# Patient Record
Sex: FEMALE | Race: WHITE | NOT HISPANIC OR LATINO | URBAN - METROPOLITAN AREA
[De-identification: names, ages, dates, MRNs, and addresses within clinical notes are randomized per-mention and may not be internally consistent; named-entity substitution may affect disease eponyms.]

---

## 2023-01-01 ENCOUNTER — INPATIENT (INPATIENT)
Facility: HOSPITAL | Age: 83
LOS: 23 days | DRG: 329 | End: 2023-03-05
Attending: SURGERY | Admitting: SURGERY
Payer: MEDICARE

## 2023-01-01 VITALS
RESPIRATION RATE: 19 BRPM | OXYGEN SATURATION: 93 % | DIASTOLIC BLOOD PRESSURE: 60 MMHG | HEART RATE: 111 BPM | TEMPERATURE: 98 F | SYSTOLIC BLOOD PRESSURE: 134 MMHG | WEIGHT: 181 LBS

## 2023-01-01 VITALS — HEART RATE: 95 BPM | DIASTOLIC BLOOD PRESSURE: 74 MMHG | SYSTOLIC BLOOD PRESSURE: 179 MMHG | OXYGEN SATURATION: 88 %

## 2023-01-01 DIAGNOSIS — I63.9 CEREBRAL INFARCTION, UNSPECIFIED: ICD-10-CM

## 2023-01-01 DIAGNOSIS — K63.89 OTHER SPECIFIED DISEASES OF INTESTINE: ICD-10-CM
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
## 2023-01-01 DIAGNOSIS — Z51.5 ENCOUNTER FOR PALLIATIVE CARE: ICD-10-CM

## 2023-01-01 DIAGNOSIS — Z90.710 ACQUIRED ABSENCE OF BOTH CERVIX AND UTERUS: Chronic | ICD-10-CM

## 2023-01-01 DIAGNOSIS — Z98.890 OTHER SPECIFIED POSTPROCEDURAL STATES: Chronic | ICD-10-CM

## 2023-01-01 DIAGNOSIS — Z90.49 ACQUIRED ABSENCE OF OTHER SPECIFIED PARTS OF DIGESTIVE TRACT: Chronic | ICD-10-CM

## 2023-01-01 DIAGNOSIS — Z99.11 DEPENDENCE ON RESPIRATOR [VENTILATOR] STATUS: ICD-10-CM

## 2023-01-01 LAB
-  AMIKACIN: SIGNIFICANT CHANGE UP
-  AMOXICILLIN/CLAVULANIC ACID: SIGNIFICANT CHANGE UP
-  AMOXICILLIN/CLAVULANIC ACID: SIGNIFICANT CHANGE UP
-  AMPICILLIN/SULBACTAM: SIGNIFICANT CHANGE UP
-  AMPICILLIN/SULBACTAM: SIGNIFICANT CHANGE UP
-  AMPICILLIN: SIGNIFICANT CHANGE UP
-  AMPICILLIN: SIGNIFICANT CHANGE UP
-  AZTREONAM: SIGNIFICANT CHANGE UP
-  CEFAZOLIN: SIGNIFICANT CHANGE UP
-  CEFAZOLIN: SIGNIFICANT CHANGE UP
-  CEFEPIME: SIGNIFICANT CHANGE UP
-  CEFIDEROCOL: SIGNIFICANT CHANGE UP
-  CEFOXITIN: SIGNIFICANT CHANGE UP
-  CEFOXITIN: SIGNIFICANT CHANGE UP
-  CEFTAZIDIME/AVIBACTAM: SIGNIFICANT CHANGE UP
-  CEFTAZIDIME/AVIBACTAM: SIGNIFICANT CHANGE UP
-  CEFTAZIDIME: SIGNIFICANT CHANGE UP
-  CEFTOLOZANE/TAZOBACTAM: SIGNIFICANT CHANGE UP
-  CEFTOLOZANE/TAZOBACTAM: SIGNIFICANT CHANGE UP
-  CEFTRIAXONE: SIGNIFICANT CHANGE UP
-  CEFTRIAXONE: SIGNIFICANT CHANGE UP
-  CIPROFLOXACIN: SIGNIFICANT CHANGE UP
-  ERTAPENEM: SIGNIFICANT CHANGE UP
-  ERTAPENEM: SIGNIFICANT CHANGE UP
-  GENTAMICIN: SIGNIFICANT CHANGE UP
-  IMIPENEM: SIGNIFICANT CHANGE UP
-  LEVOFLOXACIN: SIGNIFICANT CHANGE UP
-  MEROPENEM: SIGNIFICANT CHANGE UP
-  PIPERACILLIN/TAZOBACTAM: SIGNIFICANT CHANGE UP
-  RESISTANCE GENE NDM: SIGNIFICANT CHANGE UP
-  RESISTANCE GENE NDM: SIGNIFICANT CHANGE UP
-  TOBRAMYCIN: SIGNIFICANT CHANGE UP
-  TRIMETHOPRIM/SULFAMETHOXAZOLE: SIGNIFICANT CHANGE UP
-  TRIMETHOPRIM/SULFAMETHOXAZOLE: SIGNIFICANT CHANGE UP
ALBUMIN SERPL ELPH-MCNC: 1.6 G/DL — LOW (ref 3.5–5.2)
ALBUMIN SERPL ELPH-MCNC: 1.8 G/DL — LOW (ref 3.5–5.2)
ALBUMIN SERPL ELPH-MCNC: 1.9 G/DL — LOW (ref 3.5–5.2)
ALBUMIN SERPL ELPH-MCNC: 2.2 G/DL — LOW (ref 3.5–5.2)
ALBUMIN SERPL ELPH-MCNC: 2.4 G/DL — LOW (ref 3.5–5.2)
ALBUMIN SERPL ELPH-MCNC: 4.4 G/DL — SIGNIFICANT CHANGE UP (ref 3.5–5.2)
ALP SERPL-CCNC: 106 U/L — SIGNIFICANT CHANGE UP (ref 30–115)
ALP SERPL-CCNC: 134 U/L — HIGH (ref 30–115)
ALP SERPL-CCNC: 191 U/L — HIGH (ref 30–115)
ALP SERPL-CCNC: 39 U/L — SIGNIFICANT CHANGE UP (ref 30–115)
ALP SERPL-CCNC: 44 U/L — SIGNIFICANT CHANGE UP (ref 30–115)
ALP SERPL-CCNC: 47 U/L — SIGNIFICANT CHANGE UP (ref 30–115)
ALT FLD-CCNC: 10 U/L — SIGNIFICANT CHANGE UP (ref 0–41)
ALT FLD-CCNC: 12 U/L — SIGNIFICANT CHANGE UP (ref 0–41)
ALT FLD-CCNC: 14 U/L — SIGNIFICANT CHANGE UP (ref 0–41)
ALT FLD-CCNC: 28 U/L — SIGNIFICANT CHANGE UP (ref 0–41)
ALT FLD-CCNC: 31 U/L — SIGNIFICANT CHANGE UP (ref 0–41)
ALT FLD-CCNC: 8 U/L — SIGNIFICANT CHANGE UP (ref 0–41)
ANION GAP SERPL CALC-SCNC: -1 MMOL/L — LOW (ref 7–14)
ANION GAP SERPL CALC-SCNC: 10 MMOL/L — SIGNIFICANT CHANGE UP (ref 7–14)
ANION GAP SERPL CALC-SCNC: 10 MMOL/L — SIGNIFICANT CHANGE UP (ref 7–14)
ANION GAP SERPL CALC-SCNC: 11 MMOL/L — SIGNIFICANT CHANGE UP (ref 7–14)
ANION GAP SERPL CALC-SCNC: 12 MMOL/L — SIGNIFICANT CHANGE UP (ref 7–14)
ANION GAP SERPL CALC-SCNC: 13 MMOL/L — SIGNIFICANT CHANGE UP (ref 7–14)
ANION GAP SERPL CALC-SCNC: 14 MMOL/L — SIGNIFICANT CHANGE UP (ref 7–14)
ANION GAP SERPL CALC-SCNC: 4 MMOL/L — LOW (ref 7–14)
ANION GAP SERPL CALC-SCNC: 5 MMOL/L — LOW (ref 7–14)
ANION GAP SERPL CALC-SCNC: 5 MMOL/L — LOW (ref 7–14)
ANION GAP SERPL CALC-SCNC: 6 MMOL/L — LOW (ref 7–14)
ANION GAP SERPL CALC-SCNC: 7 MMOL/L — SIGNIFICANT CHANGE UP (ref 7–14)
ANION GAP SERPL CALC-SCNC: 8 MMOL/L — SIGNIFICANT CHANGE UP (ref 7–14)
ANION GAP SERPL CALC-SCNC: 9 MMOL/L — SIGNIFICANT CHANGE UP (ref 7–14)
APPEARANCE UR: ABNORMAL
APPEARANCE UR: CLEAR — SIGNIFICANT CHANGE UP
APTT BLD: 19.6 SEC — CRITICAL LOW (ref 27–39.2)
APTT BLD: 21.4 SEC — CRITICAL LOW (ref 27–39.2)
APTT BLD: 25.9 SEC — LOW (ref 27–39.2)
APTT BLD: 28.8 SEC — SIGNIFICANT CHANGE UP (ref 27–39.2)
APTT BLD: 29.5 SEC — SIGNIFICANT CHANGE UP (ref 27–39.2)
APTT BLD: 30.4 SEC — SIGNIFICANT CHANGE UP (ref 27–39.2)
APTT BLD: SIGNIFICANT CHANGE UP SEC (ref 27–39.2)
AST SERPL-CCNC: 17 U/L — SIGNIFICANT CHANGE UP (ref 0–41)
AST SERPL-CCNC: 17 U/L — SIGNIFICANT CHANGE UP (ref 0–41)
AST SERPL-CCNC: 21 U/L — SIGNIFICANT CHANGE UP (ref 0–41)
AST SERPL-CCNC: 29 U/L — SIGNIFICANT CHANGE UP (ref 0–41)
AST SERPL-CCNC: 43 U/L — HIGH (ref 0–41)
AST SERPL-CCNC: 63 U/L — HIGH (ref 0–41)
BACTERIA # UR AUTO: ABNORMAL
BACTERIA # UR AUTO: NEGATIVE — SIGNIFICANT CHANGE UP
BASE EXCESS BLDA CALC-SCNC: -0.9 MMOL/L — SIGNIFICANT CHANGE UP (ref -2–3)
BASE EXCESS BLDA CALC-SCNC: -1.5 MMOL/L — SIGNIFICANT CHANGE UP (ref -2–3)
BASE EXCESS BLDA CALC-SCNC: -1.6 MMOL/L — SIGNIFICANT CHANGE UP (ref -2–3)
BASE EXCESS BLDA CALC-SCNC: -6 MMOL/L — LOW (ref -2–3)
BASE EXCESS BLDA CALC-SCNC: 0.4 MMOL/L — SIGNIFICANT CHANGE UP (ref -2–3)
BASE EXCESS BLDA CALC-SCNC: 3.1 MMOL/L — HIGH (ref -2–3)
BASE EXCESS BLDV CALC-SCNC: -0.8 MMOL/L — SIGNIFICANT CHANGE UP (ref -2–3)
BASOPHILS # BLD AUTO: 0.01 K/UL — SIGNIFICANT CHANGE UP (ref 0–0.2)
BASOPHILS # BLD AUTO: 0.02 K/UL — SIGNIFICANT CHANGE UP (ref 0–0.2)
BASOPHILS # BLD AUTO: 0.03 K/UL — SIGNIFICANT CHANGE UP (ref 0–0.2)
BASOPHILS # BLD AUTO: 0.04 K/UL — SIGNIFICANT CHANGE UP (ref 0–0.2)
BASOPHILS # BLD AUTO: 0.04 K/UL — SIGNIFICANT CHANGE UP (ref 0–0.2)
BASOPHILS # BLD AUTO: 0.05 K/UL — SIGNIFICANT CHANGE UP (ref 0–0.2)
BASOPHILS # BLD AUTO: 0.07 K/UL — SIGNIFICANT CHANGE UP (ref 0–0.2)
BASOPHILS # BLD AUTO: 0.13 K/UL — SIGNIFICANT CHANGE UP (ref 0–0.2)
BASOPHILS NFR BLD AUTO: 0.1 % — SIGNIFICANT CHANGE UP (ref 0–1)
BASOPHILS NFR BLD AUTO: 0.2 % — SIGNIFICANT CHANGE UP (ref 0–1)
BASOPHILS NFR BLD AUTO: 0.3 % — SIGNIFICANT CHANGE UP (ref 0–1)
BASOPHILS NFR BLD AUTO: 0.4 % — SIGNIFICANT CHANGE UP (ref 0–1)
BASOPHILS NFR BLD AUTO: 0.4 % — SIGNIFICANT CHANGE UP (ref 0–1)
BASOPHILS NFR BLD AUTO: 0.5 % — SIGNIFICANT CHANGE UP (ref 0–1)
BASOPHILS NFR BLD AUTO: 1.8 % — HIGH (ref 0–1)
BILIRUB DIRECT SERPL-MCNC: 0.4 MG/DL — HIGH (ref 0–0.3)
BILIRUB DIRECT SERPL-MCNC: <0.2 MG/DL — SIGNIFICANT CHANGE UP (ref 0–0.3)
BILIRUB INDIRECT FLD-MCNC: 0.2 MG/DL — SIGNIFICANT CHANGE UP (ref 0.2–1.2)
BILIRUB INDIRECT FLD-MCNC: >0 MG/DL — LOW (ref 0.2–1.2)
BILIRUB INDIRECT FLD-MCNC: >0 MG/DL — LOW (ref 0.2–1.2)
BILIRUB INDIRECT FLD-MCNC: >0.1 MG/DL — LOW (ref 0.2–1.2)
BILIRUB INDIRECT FLD-MCNC: >0.2 MG/DL — SIGNIFICANT CHANGE UP (ref 0.2–1.2)
BILIRUB INDIRECT FLD-MCNC: SIGNIFICANT CHANGE UP MG/DL (ref 0.2–1.2)
BILIRUB SERPL-MCNC: 0.2 MG/DL — SIGNIFICANT CHANGE UP (ref 0.2–1.2)
BILIRUB SERPL-MCNC: 0.3 MG/DL — SIGNIFICANT CHANGE UP (ref 0.2–1.2)
BILIRUB SERPL-MCNC: 0.4 MG/DL — SIGNIFICANT CHANGE UP (ref 0.2–1.2)
BILIRUB SERPL-MCNC: 0.6 MG/DL — SIGNIFICANT CHANGE UP (ref 0.2–1.2)
BILIRUB UR-MCNC: ABNORMAL
BILIRUB UR-MCNC: NEGATIVE — SIGNIFICANT CHANGE UP
BLANDM BLD POS QL PROBE: SIGNIFICANT CHANGE UP
BLANDM BLD POS QL PROBE: SIGNIFICANT CHANGE UP
BLD GP AB SCN SERPL QL: SIGNIFICANT CHANGE UP
BUN SERPL-MCNC: 10 MG/DL — SIGNIFICANT CHANGE UP (ref 10–20)
BUN SERPL-MCNC: 10 MG/DL — SIGNIFICANT CHANGE UP (ref 10–20)
BUN SERPL-MCNC: 11 MG/DL — SIGNIFICANT CHANGE UP (ref 10–20)
BUN SERPL-MCNC: 11 MG/DL — SIGNIFICANT CHANGE UP (ref 10–20)
BUN SERPL-MCNC: 12 MG/DL — SIGNIFICANT CHANGE UP (ref 10–20)
BUN SERPL-MCNC: 12 MG/DL — SIGNIFICANT CHANGE UP (ref 10–20)
BUN SERPL-MCNC: 13 MG/DL — SIGNIFICANT CHANGE UP (ref 10–20)
BUN SERPL-MCNC: 14 MG/DL — SIGNIFICANT CHANGE UP (ref 10–20)
BUN SERPL-MCNC: 15 MG/DL — SIGNIFICANT CHANGE UP (ref 10–20)
BUN SERPL-MCNC: 16 MG/DL — SIGNIFICANT CHANGE UP (ref 10–20)
BUN SERPL-MCNC: 17 MG/DL — SIGNIFICANT CHANGE UP (ref 10–20)
BUN SERPL-MCNC: 18 MG/DL — SIGNIFICANT CHANGE UP (ref 10–20)
BUN SERPL-MCNC: 22 MG/DL — HIGH (ref 10–20)
BUN SERPL-MCNC: 23 MG/DL — HIGH (ref 10–20)
BUN SERPL-MCNC: 25 MG/DL — HIGH (ref 10–20)
BUN SERPL-MCNC: 27 MG/DL — HIGH (ref 10–20)
BUN SERPL-MCNC: 28 MG/DL — HIGH (ref 10–20)
BUN SERPL-MCNC: 28 MG/DL — HIGH (ref 10–20)
BUN SERPL-MCNC: 29 MG/DL — HIGH (ref 10–20)
BUN SERPL-MCNC: 30 MG/DL — HIGH (ref 10–20)
BUN SERPL-MCNC: 31 MG/DL — HIGH (ref 10–20)
BUN SERPL-MCNC: 31 MG/DL — HIGH (ref 10–20)
BUN SERPL-MCNC: 35 MG/DL — HIGH (ref 10–20)
BUN SERPL-MCNC: 36 MG/DL — HIGH (ref 10–20)
BUN SERPL-MCNC: 9 MG/DL — LOW (ref 10–20)
CA-I SERPL-SCNC: 1.18 MMOL/L — SIGNIFICANT CHANGE UP (ref 1.15–1.33)
CALCIUM SERPL-MCNC: 10.2 MG/DL — SIGNIFICANT CHANGE UP (ref 8.4–10.5)
CALCIUM SERPL-MCNC: 5.2 MG/DL — CRITICAL LOW (ref 8.4–10.5)
CALCIUM SERPL-MCNC: 5.6 MG/DL — CRITICAL LOW (ref 8.4–10.5)
CALCIUM SERPL-MCNC: 6.4 MG/DL — LOW (ref 8.4–10.5)
CALCIUM SERPL-MCNC: 6.6 MG/DL — LOW (ref 8.4–10.5)
CALCIUM SERPL-MCNC: 6.7 MG/DL — LOW (ref 8.4–10.4)
CALCIUM SERPL-MCNC: 6.7 MG/DL — LOW (ref 8.4–10.5)
CALCIUM SERPL-MCNC: 6.8 MG/DL — LOW (ref 8.4–10.4)
CALCIUM SERPL-MCNC: 6.9 MG/DL — LOW (ref 8.4–10.4)
CALCIUM SERPL-MCNC: 6.9 MG/DL — LOW (ref 8.4–10.5)
CALCIUM SERPL-MCNC: 7 MG/DL — LOW (ref 8.4–10.4)
CALCIUM SERPL-MCNC: 7 MG/DL — LOW (ref 8.4–10.5)
CALCIUM SERPL-MCNC: 7.1 MG/DL — LOW (ref 8.4–10.4)
CALCIUM SERPL-MCNC: 7.1 MG/DL — LOW (ref 8.4–10.5)
CALCIUM SERPL-MCNC: 7.1 MG/DL — LOW (ref 8.4–10.5)
CALCIUM SERPL-MCNC: 7.2 MG/DL — LOW (ref 8.4–10.5)
CALCIUM SERPL-MCNC: 7.4 MG/DL — LOW (ref 8.4–10.4)
CALCIUM SERPL-MCNC: 7.4 MG/DL — LOW (ref 8.4–10.4)
CALCIUM SERPL-MCNC: 7.4 MG/DL — LOW (ref 8.4–10.5)
CALCIUM SERPL-MCNC: 7.5 MG/DL — LOW (ref 8.4–10.4)
CALCIUM SERPL-MCNC: 7.6 MG/DL — LOW (ref 8.4–10.5)
CALCIUM SERPL-MCNC: 7.7 MG/DL — LOW (ref 8.4–10.5)
CALCIUM SERPL-MCNC: 7.8 MG/DL — LOW (ref 8.4–10.5)
CALCIUM SERPL-MCNC: 7.9 MG/DL — LOW (ref 8.4–10.5)
CALCIUM SERPL-MCNC: 8.6 MG/DL — SIGNIFICANT CHANGE UP (ref 8.4–10.5)
CALCIUM SERPL-MCNC: 8.6 MG/DL — SIGNIFICANT CHANGE UP (ref 8.4–10.5)
CALCIUM SERPL-MCNC: 8.8 MG/DL — SIGNIFICANT CHANGE UP (ref 8.4–10.5)
CALCIUM SERPL-MCNC: 9.2 MG/DL — SIGNIFICANT CHANGE UP (ref 8.4–10.5)
CHLORIDE SERPL-SCNC: 101 MMOL/L — SIGNIFICANT CHANGE UP (ref 98–110)
CHLORIDE SERPL-SCNC: 102 MMOL/L — SIGNIFICANT CHANGE UP (ref 98–110)
CHLORIDE SERPL-SCNC: 103 MMOL/L — SIGNIFICANT CHANGE UP (ref 98–110)
CHLORIDE SERPL-SCNC: 103 MMOL/L — SIGNIFICANT CHANGE UP (ref 98–110)
CHLORIDE SERPL-SCNC: 104 MMOL/L — SIGNIFICANT CHANGE UP (ref 98–110)
CHLORIDE SERPL-SCNC: 105 MMOL/L — SIGNIFICANT CHANGE UP (ref 98–110)
CHLORIDE SERPL-SCNC: 106 MMOL/L — SIGNIFICANT CHANGE UP (ref 98–110)
CHLORIDE SERPL-SCNC: 107 MMOL/L — SIGNIFICANT CHANGE UP (ref 98–110)
CHLORIDE SERPL-SCNC: 108 MMOL/L — SIGNIFICANT CHANGE UP (ref 98–110)
CHLORIDE SERPL-SCNC: 108 MMOL/L — SIGNIFICANT CHANGE UP (ref 98–110)
CHLORIDE SERPL-SCNC: 109 MMOL/L — SIGNIFICANT CHANGE UP (ref 98–110)
CHLORIDE SERPL-SCNC: 110 MMOL/L — SIGNIFICANT CHANGE UP (ref 98–110)
CHLORIDE SERPL-SCNC: 110 MMOL/L — SIGNIFICANT CHANGE UP (ref 98–110)
CHLORIDE SERPL-SCNC: 111 MMOL/L — HIGH (ref 98–110)
CHLORIDE SERPL-SCNC: 112 MMOL/L — HIGH (ref 98–110)
CHLORIDE SERPL-SCNC: 114 MMOL/L — HIGH (ref 98–110)
CHLORIDE SERPL-SCNC: 116 MMOL/L — HIGH (ref 98–110)
CHLORIDE SERPL-SCNC: 117 MMOL/L — HIGH (ref 98–110)
CHLORIDE SERPL-SCNC: 98 MMOL/L — SIGNIFICANT CHANGE UP (ref 98–110)
CK MB CFR SERPL CALC: 2.7 NG/ML — SIGNIFICANT CHANGE UP (ref 0.6–6.3)
CK MB CFR SERPL CALC: 3.3 NG/ML — SIGNIFICANT CHANGE UP (ref 0.6–6.3)
CK MB CFR SERPL CALC: 3.6 NG/ML — SIGNIFICANT CHANGE UP (ref 0.6–6.3)
CK SERPL-CCNC: 146 U/L — SIGNIFICANT CHANGE UP (ref 0–225)
CK SERPL-CCNC: 59 U/L — SIGNIFICANT CHANGE UP (ref 0–225)
CK SERPL-CCNC: 84 U/L — SIGNIFICANT CHANGE UP (ref 0–225)
CO2 SERPL-SCNC: 17 MMOL/L — SIGNIFICANT CHANGE UP (ref 17–32)
CO2 SERPL-SCNC: 17 MMOL/L — SIGNIFICANT CHANGE UP (ref 17–32)
CO2 SERPL-SCNC: 18 MMOL/L — SIGNIFICANT CHANGE UP (ref 17–32)
CO2 SERPL-SCNC: 20 MMOL/L — SIGNIFICANT CHANGE UP (ref 17–32)
CO2 SERPL-SCNC: 21 MMOL/L — SIGNIFICANT CHANGE UP (ref 17–32)
CO2 SERPL-SCNC: 22 MMOL/L — SIGNIFICANT CHANGE UP (ref 17–32)
CO2 SERPL-SCNC: 23 MMOL/L — SIGNIFICANT CHANGE UP (ref 17–32)
CO2 SERPL-SCNC: 23 MMOL/L — SIGNIFICANT CHANGE UP (ref 17–32)
CO2 SERPL-SCNC: 24 MMOL/L — SIGNIFICANT CHANGE UP (ref 17–32)
CO2 SERPL-SCNC: 25 MMOL/L — SIGNIFICANT CHANGE UP (ref 17–32)
CO2 SERPL-SCNC: 26 MMOL/L — SIGNIFICANT CHANGE UP (ref 17–32)
CO2 SERPL-SCNC: 26 MMOL/L — SIGNIFICANT CHANGE UP (ref 17–32)
CO2 SERPL-SCNC: 27 MMOL/L — SIGNIFICANT CHANGE UP (ref 17–32)
CO2 SERPL-SCNC: 28 MMOL/L — SIGNIFICANT CHANGE UP (ref 17–32)
CO2 SERPL-SCNC: 28 MMOL/L — SIGNIFICANT CHANGE UP (ref 17–32)
CO2 SERPL-SCNC: 32 MMOL/L — SIGNIFICANT CHANGE UP (ref 17–32)
COLOR SPEC: YELLOW — SIGNIFICANT CHANGE UP
COLOR SPEC: YELLOW — SIGNIFICANT CHANGE UP
CREAT ?TM UR-MCNC: 199 MG/DL — SIGNIFICANT CHANGE UP
CREAT SERPL-MCNC: 0.5 MG/DL — LOW (ref 0.7–1.5)
CREAT SERPL-MCNC: 0.6 MG/DL — LOW (ref 0.7–1.5)
CREAT SERPL-MCNC: 0.7 MG/DL — SIGNIFICANT CHANGE UP (ref 0.7–1.5)
CREAT SERPL-MCNC: 0.8 MG/DL — SIGNIFICANT CHANGE UP (ref 0.7–1.5)
CREAT SERPL-MCNC: 0.8 MG/DL — SIGNIFICANT CHANGE UP (ref 0.7–1.5)
CREAT SERPL-MCNC: 1 MG/DL — SIGNIFICANT CHANGE UP (ref 0.7–1.5)
CREAT SERPL-MCNC: 1.1 MG/DL — SIGNIFICANT CHANGE UP (ref 0.7–1.5)
CREAT SERPL-MCNC: 1.2 MG/DL — SIGNIFICANT CHANGE UP (ref 0.7–1.5)
CREAT SERPL-MCNC: 1.3 MG/DL — SIGNIFICANT CHANGE UP (ref 0.7–1.5)
CREAT SERPL-MCNC: 1.4 MG/DL — SIGNIFICANT CHANGE UP (ref 0.7–1.5)
CREAT SERPL-MCNC: 1.9 MG/DL — HIGH (ref 0.7–1.5)
CREAT SERPL-MCNC: 1.9 MG/DL — HIGH (ref 0.7–1.5)
CREAT SERPL-MCNC: <0.5 MG/DL — LOW (ref 0.7–1.5)
CREAT SERPL-MCNC: <0.5 MG/DL — LOW (ref 0.7–1.5)
CULTURE RESULTS: NO GROWTH — SIGNIFICANT CHANGE UP
CULTURE RESULTS: SIGNIFICANT CHANGE UP
DIFF PNL FLD: NEGATIVE — SIGNIFICANT CHANGE UP
DIFF PNL FLD: NEGATIVE — SIGNIFICANT CHANGE UP
EGFR: 26 ML/MIN/1.73M2 — LOW
EGFR: 26 ML/MIN/1.73M2 — LOW
EGFR: 37 ML/MIN/1.73M2 — LOW
EGFR: 41 ML/MIN/1.73M2 — LOW
EGFR: 45 ML/MIN/1.73M2 — LOW
EGFR: 50 ML/MIN/1.73M2 — LOW
EGFR: 56 ML/MIN/1.73M2 — LOW
EGFR: 73 ML/MIN/1.73M2 — SIGNIFICANT CHANGE UP
EGFR: 73 ML/MIN/1.73M2 — SIGNIFICANT CHANGE UP
EGFR: 85 ML/MIN/1.73M2 — SIGNIFICANT CHANGE UP
EGFR: 88 ML/MIN/1.73M2 — SIGNIFICANT CHANGE UP
EGFR: 92 ML/MIN/1.73M2 — SIGNIFICANT CHANGE UP
EGFR: 98 ML/MIN/1.73M2 — SIGNIFICANT CHANGE UP
EGFR: 98 ML/MIN/1.73M2 — SIGNIFICANT CHANGE UP
EOSINOPHIL # BLD AUTO: 0 K/UL — SIGNIFICANT CHANGE UP (ref 0–0.7)
EOSINOPHIL # BLD AUTO: 0.01 K/UL — SIGNIFICANT CHANGE UP (ref 0–0.7)
EOSINOPHIL # BLD AUTO: 0.01 K/UL — SIGNIFICANT CHANGE UP (ref 0–0.7)
EOSINOPHIL # BLD AUTO: 0.07 K/UL — SIGNIFICANT CHANGE UP (ref 0–0.7)
EOSINOPHIL # BLD AUTO: 0.08 K/UL — SIGNIFICANT CHANGE UP (ref 0–0.7)
EOSINOPHIL # BLD AUTO: 0.1 K/UL — SIGNIFICANT CHANGE UP (ref 0–0.7)
EOSINOPHIL # BLD AUTO: 0.13 K/UL — SIGNIFICANT CHANGE UP (ref 0–0.7)
EOSINOPHIL # BLD AUTO: 0.13 K/UL — SIGNIFICANT CHANGE UP (ref 0–0.7)
EOSINOPHIL # BLD AUTO: 0.15 K/UL — SIGNIFICANT CHANGE UP (ref 0–0.7)
EOSINOPHIL # BLD AUTO: 0.17 K/UL — SIGNIFICANT CHANGE UP (ref 0–0.7)
EOSINOPHIL # BLD AUTO: 0.19 K/UL — SIGNIFICANT CHANGE UP (ref 0–0.7)
EOSINOPHIL # BLD AUTO: 0.2 K/UL — SIGNIFICANT CHANGE UP (ref 0–0.7)
EOSINOPHIL # BLD AUTO: 0.21 K/UL — SIGNIFICANT CHANGE UP (ref 0–0.7)
EOSINOPHIL # BLD AUTO: 0.28 K/UL — SIGNIFICANT CHANGE UP (ref 0–0.7)
EOSINOPHIL NFR BLD AUTO: 0 % — SIGNIFICANT CHANGE UP (ref 0–8)
EOSINOPHIL NFR BLD AUTO: 0.1 % — SIGNIFICANT CHANGE UP (ref 0–8)
EOSINOPHIL NFR BLD AUTO: 0.1 % — SIGNIFICANT CHANGE UP (ref 0–8)
EOSINOPHIL NFR BLD AUTO: 0.4 % — SIGNIFICANT CHANGE UP (ref 0–8)
EOSINOPHIL NFR BLD AUTO: 0.6 % — SIGNIFICANT CHANGE UP (ref 0–8)
EOSINOPHIL NFR BLD AUTO: 0.6 % — SIGNIFICANT CHANGE UP (ref 0–8)
EOSINOPHIL NFR BLD AUTO: 0.9 % — SIGNIFICANT CHANGE UP (ref 0–8)
EOSINOPHIL NFR BLD AUTO: 0.9 % — SIGNIFICANT CHANGE UP (ref 0–8)
EOSINOPHIL NFR BLD AUTO: 1 % — SIGNIFICANT CHANGE UP (ref 0–8)
EOSINOPHIL NFR BLD AUTO: 1.2 % — SIGNIFICANT CHANGE UP (ref 0–8)
EOSINOPHIL NFR BLD AUTO: 1.5 % — SIGNIFICANT CHANGE UP (ref 0–8)
EOSINOPHIL NFR BLD AUTO: 1.5 % — SIGNIFICANT CHANGE UP (ref 0–8)
EOSINOPHIL NFR BLD AUTO: 1.8 % — SIGNIFICANT CHANGE UP (ref 0–8)
EOSINOPHIL NFR BLD AUTO: 1.9 % — SIGNIFICANT CHANGE UP (ref 0–8)
EOSINOPHIL NFR BLD AUTO: 2.7 % — SIGNIFICANT CHANGE UP (ref 0–8)
EPI CELLS # UR: 11 /HPF — HIGH (ref 0–5)
EPI CELLS # UR: 3 /HPF — SIGNIFICANT CHANGE UP (ref 0–5)
FLUAV AG NPH QL: SIGNIFICANT CHANGE UP
FLUBV AG NPH QL: SIGNIFICANT CHANGE UP
GAS PNL BLDA: SIGNIFICANT CHANGE UP
GAS PNL BLDV: 137 MMOL/L — SIGNIFICANT CHANGE UP (ref 136–145)
GAS PNL BLDV: SIGNIFICANT CHANGE UP
GLUCOSE BLDC GLUCOMTR-MCNC: 101 MG/DL — HIGH (ref 70–99)
GLUCOSE BLDC GLUCOMTR-MCNC: 103 MG/DL — HIGH (ref 70–99)
GLUCOSE BLDC GLUCOMTR-MCNC: 104 MG/DL — HIGH (ref 70–99)
GLUCOSE BLDC GLUCOMTR-MCNC: 104 MG/DL — HIGH (ref 70–99)
GLUCOSE BLDC GLUCOMTR-MCNC: 105 MG/DL — HIGH (ref 70–99)
GLUCOSE BLDC GLUCOMTR-MCNC: 106 MG/DL — HIGH (ref 70–99)
GLUCOSE BLDC GLUCOMTR-MCNC: 108 MG/DL — HIGH (ref 70–99)
GLUCOSE BLDC GLUCOMTR-MCNC: 108 MG/DL — HIGH (ref 70–99)
GLUCOSE BLDC GLUCOMTR-MCNC: 109 MG/DL — HIGH (ref 70–99)
GLUCOSE BLDC GLUCOMTR-MCNC: 110 MG/DL — HIGH (ref 70–99)
GLUCOSE BLDC GLUCOMTR-MCNC: 111 MG/DL — HIGH (ref 70–99)
GLUCOSE BLDC GLUCOMTR-MCNC: 117 MG/DL — HIGH (ref 70–99)
GLUCOSE BLDC GLUCOMTR-MCNC: 119 MG/DL — HIGH (ref 70–99)
GLUCOSE BLDC GLUCOMTR-MCNC: 119 MG/DL — HIGH (ref 70–99)
GLUCOSE BLDC GLUCOMTR-MCNC: 122 MG/DL — HIGH (ref 70–99)
GLUCOSE BLDC GLUCOMTR-MCNC: 122 MG/DL — HIGH (ref 70–99)
GLUCOSE BLDC GLUCOMTR-MCNC: 123 MG/DL — HIGH (ref 70–99)
GLUCOSE BLDC GLUCOMTR-MCNC: 124 MG/DL — HIGH (ref 70–99)
GLUCOSE BLDC GLUCOMTR-MCNC: 125 MG/DL — HIGH (ref 70–99)
GLUCOSE BLDC GLUCOMTR-MCNC: 125 MG/DL — HIGH (ref 70–99)
GLUCOSE BLDC GLUCOMTR-MCNC: 127 MG/DL — HIGH (ref 70–99)
GLUCOSE BLDC GLUCOMTR-MCNC: 128 MG/DL — HIGH (ref 70–99)
GLUCOSE BLDC GLUCOMTR-MCNC: 128 MG/DL — HIGH (ref 70–99)
GLUCOSE BLDC GLUCOMTR-MCNC: 132 MG/DL — HIGH (ref 70–99)
GLUCOSE BLDC GLUCOMTR-MCNC: 132 MG/DL — HIGH (ref 70–99)
GLUCOSE BLDC GLUCOMTR-MCNC: 134 MG/DL — HIGH (ref 70–99)
GLUCOSE BLDC GLUCOMTR-MCNC: 135 MG/DL — HIGH (ref 70–99)
GLUCOSE BLDC GLUCOMTR-MCNC: 135 MG/DL — HIGH (ref 70–99)
GLUCOSE BLDC GLUCOMTR-MCNC: 136 MG/DL — HIGH (ref 70–99)
GLUCOSE BLDC GLUCOMTR-MCNC: 137 MG/DL — HIGH (ref 70–99)
GLUCOSE BLDC GLUCOMTR-MCNC: 143 MG/DL — HIGH (ref 70–99)
GLUCOSE BLDC GLUCOMTR-MCNC: 146 MG/DL — HIGH (ref 70–99)
GLUCOSE BLDC GLUCOMTR-MCNC: 147 MG/DL — HIGH (ref 70–99)
GLUCOSE BLDC GLUCOMTR-MCNC: 149 MG/DL — HIGH (ref 70–99)
GLUCOSE BLDC GLUCOMTR-MCNC: 153 MG/DL — HIGH (ref 70–99)
GLUCOSE BLDC GLUCOMTR-MCNC: 156 MG/DL — HIGH (ref 70–99)
GLUCOSE BLDC GLUCOMTR-MCNC: 158 MG/DL — HIGH (ref 70–99)
GLUCOSE BLDC GLUCOMTR-MCNC: 160 MG/DL — HIGH (ref 70–99)
GLUCOSE BLDC GLUCOMTR-MCNC: 160 MG/DL — HIGH (ref 70–99)
GLUCOSE BLDC GLUCOMTR-MCNC: 168 MG/DL — HIGH (ref 70–99)
GLUCOSE BLDC GLUCOMTR-MCNC: 176 MG/DL — HIGH (ref 70–99)
GLUCOSE BLDC GLUCOMTR-MCNC: 186 MG/DL — HIGH (ref 70–99)
GLUCOSE BLDC GLUCOMTR-MCNC: 80 MG/DL — SIGNIFICANT CHANGE UP (ref 70–99)
GLUCOSE BLDC GLUCOMTR-MCNC: 82 MG/DL — SIGNIFICANT CHANGE UP (ref 70–99)
GLUCOSE BLDC GLUCOMTR-MCNC: 82 MG/DL — SIGNIFICANT CHANGE UP (ref 70–99)
GLUCOSE BLDC GLUCOMTR-MCNC: 85 MG/DL — SIGNIFICANT CHANGE UP (ref 70–99)
GLUCOSE BLDC GLUCOMTR-MCNC: 92 MG/DL — SIGNIFICANT CHANGE UP (ref 70–99)
GLUCOSE BLDC GLUCOMTR-MCNC: 94 MG/DL — SIGNIFICANT CHANGE UP (ref 70–99)
GLUCOSE SERPL-MCNC: 102 MG/DL — HIGH (ref 70–99)
GLUCOSE SERPL-MCNC: 105 MG/DL — HIGH (ref 70–99)
GLUCOSE SERPL-MCNC: 109 MG/DL — HIGH (ref 70–99)
GLUCOSE SERPL-MCNC: 110 MG/DL — HIGH (ref 70–99)
GLUCOSE SERPL-MCNC: 111 MG/DL — HIGH (ref 70–99)
GLUCOSE SERPL-MCNC: 111 MG/DL — HIGH (ref 70–99)
GLUCOSE SERPL-MCNC: 112 MG/DL — HIGH (ref 70–99)
GLUCOSE SERPL-MCNC: 116 MG/DL — HIGH (ref 70–99)
GLUCOSE SERPL-MCNC: 119 MG/DL — HIGH (ref 70–99)
GLUCOSE SERPL-MCNC: 119 MG/DL — HIGH (ref 70–99)
GLUCOSE SERPL-MCNC: 120 MG/DL — HIGH (ref 70–99)
GLUCOSE SERPL-MCNC: 121 MG/DL — HIGH (ref 70–99)
GLUCOSE SERPL-MCNC: 122 MG/DL — HIGH (ref 70–99)
GLUCOSE SERPL-MCNC: 128 MG/DL — HIGH (ref 70–99)
GLUCOSE SERPL-MCNC: 128 MG/DL — HIGH (ref 70–99)
GLUCOSE SERPL-MCNC: 137 MG/DL — HIGH (ref 70–99)
GLUCOSE SERPL-MCNC: 139 MG/DL — HIGH (ref 70–99)
GLUCOSE SERPL-MCNC: 141 MG/DL — HIGH (ref 70–99)
GLUCOSE SERPL-MCNC: 146 MG/DL — HIGH (ref 70–99)
GLUCOSE SERPL-MCNC: 146 MG/DL — HIGH (ref 70–99)
GLUCOSE SERPL-MCNC: 150 MG/DL — HIGH (ref 70–99)
GLUCOSE SERPL-MCNC: 163 MG/DL — HIGH (ref 70–99)
GLUCOSE SERPL-MCNC: 165 MG/DL — HIGH (ref 70–99)
GLUCOSE SERPL-MCNC: 72 MG/DL — SIGNIFICANT CHANGE UP (ref 70–99)
GLUCOSE SERPL-MCNC: 86 MG/DL — SIGNIFICANT CHANGE UP (ref 70–99)
GLUCOSE SERPL-MCNC: 89 MG/DL — SIGNIFICANT CHANGE UP (ref 70–99)
GLUCOSE SERPL-MCNC: 91 MG/DL — SIGNIFICANT CHANGE UP (ref 70–99)
GLUCOSE SERPL-MCNC: 93 MG/DL — SIGNIFICANT CHANGE UP (ref 70–99)
GLUCOSE SERPL-MCNC: 97 MG/DL — SIGNIFICANT CHANGE UP (ref 70–99)
GLUCOSE SERPL-MCNC: 98 MG/DL — SIGNIFICANT CHANGE UP (ref 70–99)
GLUCOSE SERPL-MCNC: 99 MG/DL — SIGNIFICANT CHANGE UP (ref 70–99)
GLUCOSE UR QL: NEGATIVE — SIGNIFICANT CHANGE UP
GLUCOSE UR QL: NEGATIVE — SIGNIFICANT CHANGE UP
GRAM STN FLD: SIGNIFICANT CHANGE UP
GRAN CASTS # UR COMP ASSIST: 1 /LPF — HIGH
HCO3 BLDA-SCNC: 17 MMOL/L — LOW (ref 21–28)
HCO3 BLDA-SCNC: 22 MMOL/L — SIGNIFICANT CHANGE UP (ref 21–28)
HCO3 BLDA-SCNC: 22 MMOL/L — SIGNIFICANT CHANGE UP (ref 21–28)
HCO3 BLDA-SCNC: 23 MMOL/L — SIGNIFICANT CHANGE UP (ref 21–28)
HCO3 BLDA-SCNC: 24 MMOL/L — SIGNIFICANT CHANGE UP (ref 21–28)
HCO3 BLDA-SCNC: 27 MMOL/L — SIGNIFICANT CHANGE UP (ref 21–28)
HCO3 BLDV-SCNC: 24 MMOL/L — SIGNIFICANT CHANGE UP (ref 22–29)
HCT VFR BLD CALC: 21.7 % — LOW (ref 37–47)
HCT VFR BLD CALC: 21.7 % — LOW (ref 37–47)
HCT VFR BLD CALC: 21.9 % — LOW (ref 37–47)
HCT VFR BLD CALC: 22.5 % — LOW (ref 37–47)
HCT VFR BLD CALC: 23.8 % — LOW (ref 37–47)
HCT VFR BLD CALC: 24.1 % — LOW (ref 37–47)
HCT VFR BLD CALC: 24.2 % — LOW (ref 37–47)
HCT VFR BLD CALC: 24.3 % — LOW (ref 37–47)
HCT VFR BLD CALC: 24.4 % — LOW (ref 37–47)
HCT VFR BLD CALC: 24.7 % — LOW (ref 37–47)
HCT VFR BLD CALC: 24.9 % — LOW (ref 37–47)
HCT VFR BLD CALC: 25.1 % — LOW (ref 37–47)
HCT VFR BLD CALC: 25.2 % — LOW (ref 37–47)
HCT VFR BLD CALC: 25.3 % — LOW (ref 37–47)
HCT VFR BLD CALC: 25.3 % — LOW (ref 37–47)
HCT VFR BLD CALC: 25.8 % — LOW (ref 37–47)
HCT VFR BLD CALC: 26.2 % — LOW (ref 37–47)
HCT VFR BLD CALC: 27.5 % — LOW (ref 37–47)
HCT VFR BLD CALC: 27.8 % — LOW (ref 37–47)
HCT VFR BLD CALC: 28.5 % — LOW (ref 37–47)
HCT VFR BLD CALC: 28.7 % — LOW (ref 37–47)
HCT VFR BLD CALC: 32.5 % — LOW (ref 37–47)
HCT VFR BLD CALC: 33.1 % — LOW (ref 37–47)
HCT VFR BLD CALC: 33.2 % — LOW (ref 37–47)
HCT VFR BLD CALC: 33.3 % — LOW (ref 37–47)
HCT VFR BLD CALC: 34.2 % — LOW (ref 37–47)
HCT VFR BLD CALC: 34.5 % — LOW (ref 37–47)
HCT VFR BLD CALC: 36.4 % — LOW (ref 37–47)
HCT VFR BLD CALC: 38.5 % — SIGNIFICANT CHANGE UP (ref 37–47)
HCT VFR BLD CALC: 39.1 % — SIGNIFICANT CHANGE UP (ref 37–47)
HCT VFR BLD CALC: 39.8 % — SIGNIFICANT CHANGE UP (ref 37–47)
HCT VFR BLD CALC: 41 % — SIGNIFICANT CHANGE UP (ref 37–47)
HCT VFR BLD CALC: 42.3 % — SIGNIFICANT CHANGE UP (ref 37–47)
HCT VFR BLDA CALC: 36 % — LOW (ref 39–51)
HGB BLD CALC-MCNC: 12 G/DL — LOW (ref 12.6–17.4)
HGB BLD-MCNC: 10.1 G/DL — LOW (ref 12–16)
HGB BLD-MCNC: 10.4 G/DL — LOW (ref 12–16)
HGB BLD-MCNC: 10.5 G/DL — LOW (ref 12–16)
HGB BLD-MCNC: 10.6 G/DL — LOW (ref 12–16)
HGB BLD-MCNC: 10.8 G/DL — LOW (ref 12–16)
HGB BLD-MCNC: 11 G/DL — LOW (ref 12–16)
HGB BLD-MCNC: 11.8 G/DL — LOW (ref 12–16)
HGB BLD-MCNC: 12.1 G/DL — SIGNIFICANT CHANGE UP (ref 12–16)
HGB BLD-MCNC: 12.3 G/DL — SIGNIFICANT CHANGE UP (ref 12–16)
HGB BLD-MCNC: 12.7 G/DL — SIGNIFICANT CHANGE UP (ref 12–16)
HGB BLD-MCNC: 13 G/DL — SIGNIFICANT CHANGE UP (ref 12–16)
HGB BLD-MCNC: 13.1 G/DL — SIGNIFICANT CHANGE UP (ref 12–16)
HGB BLD-MCNC: 6.6 G/DL — CRITICAL LOW (ref 12–16)
HGB BLD-MCNC: 6.7 G/DL — CRITICAL LOW (ref 12–16)
HGB BLD-MCNC: 6.7 G/DL — CRITICAL LOW (ref 12–16)
HGB BLD-MCNC: 7.1 G/DL — LOW (ref 12–16)
HGB BLD-MCNC: 7.3 G/DL — LOW (ref 12–16)
HGB BLD-MCNC: 7.4 G/DL — LOW (ref 12–16)
HGB BLD-MCNC: 7.5 G/DL — LOW (ref 12–16)
HGB BLD-MCNC: 7.6 G/DL — LOW (ref 12–16)
HGB BLD-MCNC: 7.6 G/DL — LOW (ref 12–16)
HGB BLD-MCNC: 7.8 G/DL — LOW (ref 12–16)
HGB BLD-MCNC: 7.8 G/DL — LOW (ref 12–16)
HGB BLD-MCNC: 7.9 G/DL — LOW (ref 12–16)
HGB BLD-MCNC: 8 G/DL — LOW (ref 12–16)
HGB BLD-MCNC: 8.3 G/DL — LOW (ref 12–16)
HGB BLD-MCNC: 8.3 G/DL — LOW (ref 12–16)
HGB BLD-MCNC: 8.7 G/DL — LOW (ref 12–16)
HGB BLD-MCNC: 8.7 G/DL — LOW (ref 12–16)
HGB BLD-MCNC: 8.8 G/DL — LOW (ref 12–16)
HGB BLD-MCNC: 9 G/DL — LOW (ref 12–16)
HOROWITZ INDEX BLDA+IHG-RTO: 40 — SIGNIFICANT CHANGE UP
HYALINE CASTS # UR AUTO: 4 /LPF — SIGNIFICANT CHANGE UP (ref 0–7)
HYALINE CASTS # UR AUTO: 80 /LPF — HIGH (ref 0–7)
IMM GRANULOCYTES NFR BLD AUTO: 0.3 % — SIGNIFICANT CHANGE UP (ref 0.1–0.3)
IMM GRANULOCYTES NFR BLD AUTO: 0.4 % — HIGH (ref 0.1–0.3)
IMM GRANULOCYTES NFR BLD AUTO: 0.4 % — HIGH (ref 0.1–0.3)
IMM GRANULOCYTES NFR BLD AUTO: 0.5 % — HIGH (ref 0.1–0.3)
IMM GRANULOCYTES NFR BLD AUTO: 0.6 % — HIGH (ref 0.1–0.3)
IMM GRANULOCYTES NFR BLD AUTO: 1.6 % — HIGH (ref 0.1–0.3)
IMM GRANULOCYTES NFR BLD AUTO: 1.7 % — HIGH (ref 0.1–0.3)
IMM GRANULOCYTES NFR BLD AUTO: 1.8 % — HIGH (ref 0.1–0.3)
IMM GRANULOCYTES NFR BLD AUTO: 2.1 % — HIGH (ref 0.1–0.3)
IMM GRANULOCYTES NFR BLD AUTO: 2.3 % — HIGH (ref 0.1–0.3)
IMM GRANULOCYTES NFR BLD AUTO: 2.8 % — HIGH (ref 0.1–0.3)
IMM GRANULOCYTES NFR BLD AUTO: 3.4 % — HIGH (ref 0.1–0.3)
IMM GRANULOCYTES NFR BLD AUTO: 3.4 % — HIGH (ref 0.1–0.3)
IMM GRANULOCYTES NFR BLD AUTO: 3.6 % — HIGH (ref 0.1–0.3)
IMM GRANULOCYTES NFR BLD AUTO: 4.8 % — HIGH (ref 0.1–0.3)
INR BLD: 0.92 RATIO — SIGNIFICANT CHANGE UP (ref 0.65–1.3)
INR BLD: 0.94 RATIO — SIGNIFICANT CHANGE UP (ref 0.65–1.3)
INR BLD: 0.95 RATIO — SIGNIFICANT CHANGE UP (ref 0.65–1.3)
INR BLD: 0.97 RATIO — SIGNIFICANT CHANGE UP (ref 0.65–1.3)
INR BLD: 0.97 RATIO — SIGNIFICANT CHANGE UP (ref 0.65–1.3)
INR BLD: 1.04 RATIO — SIGNIFICANT CHANGE UP (ref 0.65–1.3)
INR BLD: 1.21 RATIO — SIGNIFICANT CHANGE UP (ref 0.65–1.3)
KETONES UR-MCNC: ABNORMAL
KETONES UR-MCNC: NEGATIVE — SIGNIFICANT CHANGE UP
LACTATE BLDV-MCNC: 1.2 MMOL/L — SIGNIFICANT CHANGE UP (ref 0.5–2)
LACTATE SERPL-SCNC: 1.4 MMOL/L — SIGNIFICANT CHANGE UP (ref 0.7–2)
LACTATE SERPL-SCNC: 3.6 MMOL/L — HIGH (ref 0.7–2)
LACTATE SERPL-SCNC: 3.8 MMOL/L — HIGH (ref 0.7–2)
LACTATE SERPL-SCNC: 4.1 MMOL/L — CRITICAL HIGH (ref 0.7–2)
LEUKOCYTE ESTERASE UR-ACNC: NEGATIVE — SIGNIFICANT CHANGE UP
LEUKOCYTE ESTERASE UR-ACNC: NEGATIVE — SIGNIFICANT CHANGE UP
LIDOCAIN IGE QN: 32 U/L — SIGNIFICANT CHANGE UP (ref 7–60)
LYMPHOCYTES # BLD AUTO: 0.25 K/UL — LOW (ref 1.2–3.4)
LYMPHOCYTES # BLD AUTO: 0.64 K/UL — LOW (ref 1.2–3.4)
LYMPHOCYTES # BLD AUTO: 0.73 K/UL — LOW (ref 1.2–3.4)
LYMPHOCYTES # BLD AUTO: 0.76 K/UL — LOW (ref 1.2–3.4)
LYMPHOCYTES # BLD AUTO: 0.8 K/UL — LOW (ref 1.2–3.4)
LYMPHOCYTES # BLD AUTO: 0.88 K/UL — LOW (ref 1.2–3.4)
LYMPHOCYTES # BLD AUTO: 0.89 K/UL — LOW (ref 1.2–3.4)
LYMPHOCYTES # BLD AUTO: 1 K/UL — LOW (ref 1.2–3.4)
LYMPHOCYTES # BLD AUTO: 1.02 K/UL — LOW (ref 1.2–3.4)
LYMPHOCYTES # BLD AUTO: 1.03 K/UL — LOW (ref 1.2–3.4)
LYMPHOCYTES # BLD AUTO: 1.03 K/UL — LOW (ref 1.2–3.4)
LYMPHOCYTES # BLD AUTO: 1.04 K/UL — LOW (ref 1.2–3.4)
LYMPHOCYTES # BLD AUTO: 1.04 K/UL — LOW (ref 1.2–3.4)
LYMPHOCYTES # BLD AUTO: 1.05 K/UL — LOW (ref 1.2–3.4)
LYMPHOCYTES # BLD AUTO: 1.11 K/UL — LOW (ref 1.2–3.4)
LYMPHOCYTES # BLD AUTO: 1.29 K/UL — SIGNIFICANT CHANGE UP (ref 1.2–3.4)
LYMPHOCYTES # BLD AUTO: 1.46 K/UL — SIGNIFICANT CHANGE UP (ref 1.2–3.4)
LYMPHOCYTES # BLD AUTO: 1.5 K/UL — SIGNIFICANT CHANGE UP (ref 1.2–3.4)
LYMPHOCYTES # BLD AUTO: 11.4 % — LOW (ref 20.5–51.1)
LYMPHOCYTES # BLD AUTO: 13.9 % — LOW (ref 20.5–51.1)
LYMPHOCYTES # BLD AUTO: 17.6 % — LOW (ref 20.5–51.1)
LYMPHOCYTES # BLD AUTO: 3.5 % — LOW (ref 20.5–51.1)
LYMPHOCYTES # BLD AUTO: 4.4 % — LOW (ref 20.5–51.1)
LYMPHOCYTES # BLD AUTO: 4.6 % — LOW (ref 20.5–51.1)
LYMPHOCYTES # BLD AUTO: 4.7 % — LOW (ref 20.5–51.1)
LYMPHOCYTES # BLD AUTO: 5.1 % — LOW (ref 20.5–51.1)
LYMPHOCYTES # BLD AUTO: 5.1 % — LOW (ref 20.5–51.1)
LYMPHOCYTES # BLD AUTO: 5.2 % — LOW (ref 20.5–51.1)
LYMPHOCYTES # BLD AUTO: 5.5 % — LOW (ref 20.5–51.1)
LYMPHOCYTES # BLD AUTO: 5.8 % — LOW (ref 20.5–51.1)
LYMPHOCYTES # BLD AUTO: 6.3 % — LOW (ref 20.5–51.1)
LYMPHOCYTES # BLD AUTO: 6.9 % — LOW (ref 20.5–51.1)
LYMPHOCYTES # BLD AUTO: 7.1 % — LOW (ref 20.5–51.1)
LYMPHOCYTES # BLD AUTO: 7.3 % — LOW (ref 20.5–51.1)
LYMPHOCYTES # BLD AUTO: 8.6 % — LOW (ref 20.5–51.1)
LYMPHOCYTES # BLD AUTO: 9.4 % — LOW (ref 20.5–51.1)
LYMPHOCYTES # BLD AUTO: 9.6 % — LOW (ref 20.5–51.1)
LYMPHOCYTES # BLD AUTO: 9.9 % — LOW (ref 20.5–51.1)
MAGNESIUM SERPL-MCNC: 1.2 MG/DL — LOW (ref 1.8–2.4)
MAGNESIUM SERPL-MCNC: 1.5 MG/DL — LOW (ref 1.8–2.4)
MAGNESIUM SERPL-MCNC: 1.5 MG/DL — LOW (ref 1.8–2.4)
MAGNESIUM SERPL-MCNC: 1.6 MG/DL — LOW (ref 1.8–2.4)
MAGNESIUM SERPL-MCNC: 1.7 MG/DL — LOW (ref 1.8–2.4)
MAGNESIUM SERPL-MCNC: 1.7 MG/DL — LOW (ref 1.8–2.4)
MAGNESIUM SERPL-MCNC: 1.8 MG/DL — SIGNIFICANT CHANGE UP (ref 1.8–2.4)
MAGNESIUM SERPL-MCNC: 1.9 MG/DL — SIGNIFICANT CHANGE UP (ref 1.8–2.4)
MAGNESIUM SERPL-MCNC: 2 MG/DL — SIGNIFICANT CHANGE UP (ref 1.8–2.4)
MAGNESIUM SERPL-MCNC: 2.1 MG/DL — SIGNIFICANT CHANGE UP (ref 1.8–2.4)
MAGNESIUM SERPL-MCNC: 2.2 MG/DL — SIGNIFICANT CHANGE UP (ref 1.8–2.4)
MAGNESIUM SERPL-MCNC: 2.3 MG/DL — SIGNIFICANT CHANGE UP (ref 1.8–2.4)
MAGNESIUM SERPL-MCNC: 2.4 MG/DL — SIGNIFICANT CHANGE UP (ref 1.8–2.4)
MCHC RBC-ENTMCNC: 26.1 PG — LOW (ref 27–31)
MCHC RBC-ENTMCNC: 26.4 PG — LOW (ref 27–31)
MCHC RBC-ENTMCNC: 26.4 PG — LOW (ref 27–31)
MCHC RBC-ENTMCNC: 26.5 PG — LOW (ref 27–31)
MCHC RBC-ENTMCNC: 26.6 PG — LOW (ref 27–31)
MCHC RBC-ENTMCNC: 26.7 PG — LOW (ref 27–31)
MCHC RBC-ENTMCNC: 26.8 PG — LOW (ref 27–31)
MCHC RBC-ENTMCNC: 26.9 PG — LOW (ref 27–31)
MCHC RBC-ENTMCNC: 26.9 PG — LOW (ref 27–31)
MCHC RBC-ENTMCNC: 27 PG — SIGNIFICANT CHANGE UP (ref 27–31)
MCHC RBC-ENTMCNC: 27.1 PG — SIGNIFICANT CHANGE UP (ref 27–31)
MCHC RBC-ENTMCNC: 27.4 PG — SIGNIFICANT CHANGE UP (ref 27–31)
MCHC RBC-ENTMCNC: 27.7 PG — SIGNIFICANT CHANGE UP (ref 27–31)
MCHC RBC-ENTMCNC: 27.7 PG — SIGNIFICANT CHANGE UP (ref 27–31)
MCHC RBC-ENTMCNC: 27.9 PG — SIGNIFICANT CHANGE UP (ref 27–31)
MCHC RBC-ENTMCNC: 28.4 PG — SIGNIFICANT CHANGE UP (ref 27–31)
MCHC RBC-ENTMCNC: 29.9 G/DL — LOW (ref 32–37)
MCHC RBC-ENTMCNC: 30.3 G/DL — LOW (ref 32–37)
MCHC RBC-ENTMCNC: 30.4 G/DL — LOW (ref 32–37)
MCHC RBC-ENTMCNC: 30.4 G/DL — LOW (ref 32–37)
MCHC RBC-ENTMCNC: 30.5 G/DL — LOW (ref 32–37)
MCHC RBC-ENTMCNC: 30.5 G/DL — LOW (ref 32–37)
MCHC RBC-ENTMCNC: 30.6 G/DL — LOW (ref 32–37)
MCHC RBC-ENTMCNC: 30.8 G/DL — LOW (ref 32–37)
MCHC RBC-ENTMCNC: 30.9 G/DL — LOW (ref 32–37)
MCHC RBC-ENTMCNC: 31 G/DL — LOW (ref 32–37)
MCHC RBC-ENTMCNC: 31.1 G/DL — LOW (ref 32–37)
MCHC RBC-ENTMCNC: 31.2 G/DL — LOW (ref 32–37)
MCHC RBC-ENTMCNC: 31.3 G/DL — LOW (ref 32–37)
MCHC RBC-ENTMCNC: 31.4 G/DL — LOW (ref 32–37)
MCHC RBC-ENTMCNC: 31.5 G/DL — LOW (ref 32–37)
MCHC RBC-ENTMCNC: 31.5 G/DL — LOW (ref 32–37)
MCHC RBC-ENTMCNC: 31.6 G/DL — LOW (ref 32–37)
MCHC RBC-ENTMCNC: 31.7 G/DL — LOW (ref 32–37)
MCHC RBC-ENTMCNC: 31.8 G/DL — LOW (ref 32–37)
MCHC RBC-ENTMCNC: 31.9 G/DL — LOW (ref 32–37)
MCHC RBC-ENTMCNC: 31.9 G/DL — LOW (ref 32–37)
MCHC RBC-ENTMCNC: 32 G/DL — SIGNIFICANT CHANGE UP (ref 32–37)
MCHC RBC-ENTMCNC: 32.2 G/DL — SIGNIFICANT CHANGE UP (ref 32–37)
MCHC RBC-ENTMCNC: 32.4 G/DL — SIGNIFICANT CHANGE UP (ref 32–37)
MCV RBC AUTO: 83 FL — SIGNIFICANT CHANGE UP (ref 81–99)
MCV RBC AUTO: 84.5 FL — SIGNIFICANT CHANGE UP (ref 81–99)
MCV RBC AUTO: 84.6 FL — SIGNIFICANT CHANGE UP (ref 81–99)
MCV RBC AUTO: 84.6 FL — SIGNIFICANT CHANGE UP (ref 81–99)
MCV RBC AUTO: 84.9 FL — SIGNIFICANT CHANGE UP (ref 81–99)
MCV RBC AUTO: 85 FL — SIGNIFICANT CHANGE UP (ref 81–99)
MCV RBC AUTO: 85.2 FL — SIGNIFICANT CHANGE UP (ref 81–99)
MCV RBC AUTO: 85.2 FL — SIGNIFICANT CHANGE UP (ref 81–99)
MCV RBC AUTO: 85.4 FL — SIGNIFICANT CHANGE UP (ref 81–99)
MCV RBC AUTO: 85.5 FL — SIGNIFICANT CHANGE UP (ref 81–99)
MCV RBC AUTO: 85.5 FL — SIGNIFICANT CHANGE UP (ref 81–99)
MCV RBC AUTO: 85.6 FL — SIGNIFICANT CHANGE UP (ref 81–99)
MCV RBC AUTO: 85.8 FL — SIGNIFICANT CHANGE UP (ref 81–99)
MCV RBC AUTO: 85.8 FL — SIGNIFICANT CHANGE UP (ref 81–99)
MCV RBC AUTO: 86.1 FL — SIGNIFICANT CHANGE UP (ref 81–99)
MCV RBC AUTO: 86.2 FL — SIGNIFICANT CHANGE UP (ref 81–99)
MCV RBC AUTO: 86.3 FL — SIGNIFICANT CHANGE UP (ref 81–99)
MCV RBC AUTO: 86.6 FL — SIGNIFICANT CHANGE UP (ref 81–99)
MCV RBC AUTO: 86.8 FL — SIGNIFICANT CHANGE UP (ref 81–99)
MCV RBC AUTO: 86.9 FL — SIGNIFICANT CHANGE UP (ref 81–99)
MCV RBC AUTO: 87 FL — SIGNIFICANT CHANGE UP (ref 81–99)
MCV RBC AUTO: 87.1 FL — SIGNIFICANT CHANGE UP (ref 81–99)
MCV RBC AUTO: 87.1 FL — SIGNIFICANT CHANGE UP (ref 81–99)
MCV RBC AUTO: 87.4 FL — SIGNIFICANT CHANGE UP (ref 81–99)
MCV RBC AUTO: 87.6 FL — SIGNIFICANT CHANGE UP (ref 81–99)
MCV RBC AUTO: 87.7 FL — SIGNIFICANT CHANGE UP (ref 81–99)
MCV RBC AUTO: 88.1 FL — SIGNIFICANT CHANGE UP (ref 81–99)
MCV RBC AUTO: 88.2 FL — SIGNIFICANT CHANGE UP (ref 81–99)
MCV RBC AUTO: 88.3 FL — SIGNIFICANT CHANGE UP (ref 81–99)
MCV RBC AUTO: 88.3 FL — SIGNIFICANT CHANGE UP (ref 81–99)
MCV RBC AUTO: 88.7 FL — SIGNIFICANT CHANGE UP (ref 81–99)
METHOD TYPE: SIGNIFICANT CHANGE UP
MONOCYTES # BLD AUTO: 0 K/UL — LOW (ref 0.1–0.6)
MONOCYTES # BLD AUTO: 0.42 K/UL — SIGNIFICANT CHANGE UP (ref 0.1–0.6)
MONOCYTES # BLD AUTO: 0.65 K/UL — HIGH (ref 0.1–0.6)
MONOCYTES # BLD AUTO: 0.83 K/UL — HIGH (ref 0.1–0.6)
MONOCYTES # BLD AUTO: 0.91 K/UL — HIGH (ref 0.1–0.6)
MONOCYTES # BLD AUTO: 0.92 K/UL — HIGH (ref 0.1–0.6)
MONOCYTES # BLD AUTO: 0.93 K/UL — HIGH (ref 0.1–0.6)
MONOCYTES # BLD AUTO: 0.95 K/UL — HIGH (ref 0.1–0.6)
MONOCYTES # BLD AUTO: 0.95 K/UL — HIGH (ref 0.1–0.6)
MONOCYTES # BLD AUTO: 0.97 K/UL — HIGH (ref 0.1–0.6)
MONOCYTES # BLD AUTO: 0.99 K/UL — HIGH (ref 0.1–0.6)
MONOCYTES # BLD AUTO: 1 K/UL — HIGH (ref 0.1–0.6)
MONOCYTES # BLD AUTO: 1.01 K/UL — HIGH (ref 0.1–0.6)
MONOCYTES # BLD AUTO: 1.07 K/UL — HIGH (ref 0.1–0.6)
MONOCYTES # BLD AUTO: 1.1 K/UL — HIGH (ref 0.1–0.6)
MONOCYTES # BLD AUTO: 1.17 K/UL — HIGH (ref 0.1–0.6)
MONOCYTES # BLD AUTO: 1.26 K/UL — HIGH (ref 0.1–0.6)
MONOCYTES # BLD AUTO: 1.3 K/UL — HIGH (ref 0.1–0.6)
MONOCYTES # BLD AUTO: 1.34 K/UL — HIGH (ref 0.1–0.6)
MONOCYTES # BLD AUTO: 1.64 K/UL — HIGH (ref 0.1–0.6)
MONOCYTES NFR BLD AUTO: 0 % — LOW (ref 1.7–9.3)
MONOCYTES NFR BLD AUTO: 10 % — HIGH (ref 1.7–9.3)
MONOCYTES NFR BLD AUTO: 12.4 % — HIGH (ref 1.7–9.3)
MONOCYTES NFR BLD AUTO: 3.9 % — SIGNIFICANT CHANGE UP (ref 1.7–9.3)
MONOCYTES NFR BLD AUTO: 4.9 % — SIGNIFICANT CHANGE UP (ref 1.7–9.3)
MONOCYTES NFR BLD AUTO: 5 % — SIGNIFICANT CHANGE UP (ref 1.7–9.3)
MONOCYTES NFR BLD AUTO: 5.3 % — SIGNIFICANT CHANGE UP (ref 1.7–9.3)
MONOCYTES NFR BLD AUTO: 5.5 % — SIGNIFICANT CHANGE UP (ref 1.7–9.3)
MONOCYTES NFR BLD AUTO: 6.1 % — SIGNIFICANT CHANGE UP (ref 1.7–9.3)
MONOCYTES NFR BLD AUTO: 6.3 % — SIGNIFICANT CHANGE UP (ref 1.7–9.3)
MONOCYTES NFR BLD AUTO: 6.4 % — SIGNIFICANT CHANGE UP (ref 1.7–9.3)
MONOCYTES NFR BLD AUTO: 6.9 % — SIGNIFICANT CHANGE UP (ref 1.7–9.3)
MONOCYTES NFR BLD AUTO: 7.2 % — SIGNIFICANT CHANGE UP (ref 1.7–9.3)
MONOCYTES NFR BLD AUTO: 8 % — SIGNIFICANT CHANGE UP (ref 1.7–9.3)
MONOCYTES NFR BLD AUTO: 8.4 % — SIGNIFICANT CHANGE UP (ref 1.7–9.3)
MONOCYTES NFR BLD AUTO: 8.5 % — SIGNIFICANT CHANGE UP (ref 1.7–9.3)
MONOCYTES NFR BLD AUTO: 9 % — SIGNIFICANT CHANGE UP (ref 1.7–9.3)
MONOCYTES NFR BLD AUTO: 9 % — SIGNIFICANT CHANGE UP (ref 1.7–9.3)
MONOCYTES NFR BLD AUTO: 9.2 % — SIGNIFICANT CHANGE UP (ref 1.7–9.3)
MONOCYTES NFR BLD AUTO: 9.7 % — HIGH (ref 1.7–9.3)
NEUTROPHILS # BLD AUTO: 11.33 K/UL — HIGH (ref 1.4–6.5)
NEUTROPHILS # BLD AUTO: 11.49 K/UL — HIGH (ref 1.4–6.5)
NEUTROPHILS # BLD AUTO: 12.24 K/UL — HIGH (ref 1.4–6.5)
NEUTROPHILS # BLD AUTO: 12.95 K/UL — HIGH (ref 1.4–6.5)
NEUTROPHILS # BLD AUTO: 13.25 K/UL — HIGH (ref 1.4–6.5)
NEUTROPHILS # BLD AUTO: 13.82 K/UL — HIGH (ref 1.4–6.5)
NEUTROPHILS # BLD AUTO: 14.53 K/UL — HIGH (ref 1.4–6.5)
NEUTROPHILS # BLD AUTO: 14.55 K/UL — HIGH (ref 1.4–6.5)
NEUTROPHILS # BLD AUTO: 15.05 K/UL — HIGH (ref 1.4–6.5)
NEUTROPHILS # BLD AUTO: 16.31 K/UL — HIGH (ref 1.4–6.5)
NEUTROPHILS # BLD AUTO: 16.48 K/UL — HIGH (ref 1.4–6.5)
NEUTROPHILS # BLD AUTO: 17.32 K/UL — HIGH (ref 1.4–6.5)
NEUTROPHILS # BLD AUTO: 4.31 K/UL — SIGNIFICANT CHANGE UP (ref 1.4–6.5)
NEUTROPHILS # BLD AUTO: 6.4 K/UL — SIGNIFICANT CHANGE UP (ref 1.4–6.5)
NEUTROPHILS # BLD AUTO: 7.66 K/UL — HIGH (ref 1.4–6.5)
NEUTROPHILS # BLD AUTO: 8.19 K/UL — HIGH (ref 1.4–6.5)
NEUTROPHILS # BLD AUTO: 8.26 K/UL — HIGH (ref 1.4–6.5)
NEUTROPHILS # BLD AUTO: 8.85 K/UL — HIGH (ref 1.4–6.5)
NEUTROPHILS # BLD AUTO: 9.19 K/UL — HIGH (ref 1.4–6.5)
NEUTROPHILS # BLD AUTO: 9.3 K/UL — HIGH (ref 1.4–6.5)
NEUTROPHILS NFR BLD AUTO: 72.8 % — SIGNIFICANT CHANGE UP (ref 42.2–75.2)
NEUTROPHILS NFR BLD AUTO: 73.5 % — SIGNIFICANT CHANGE UP (ref 42.2–75.2)
NEUTROPHILS NFR BLD AUTO: 77.7 % — HIGH (ref 42.2–75.2)
NEUTROPHILS NFR BLD AUTO: 78.1 % — HIGH (ref 42.2–75.2)
NEUTROPHILS NFR BLD AUTO: 79.6 % — HIGH (ref 42.2–75.2)
NEUTROPHILS NFR BLD AUTO: 79.8 % — HIGH (ref 42.2–75.2)
NEUTROPHILS NFR BLD AUTO: 80.4 % — HIGH (ref 42.2–75.2)
NEUTROPHILS NFR BLD AUTO: 80.8 % — HIGH (ref 42.2–75.2)
NEUTROPHILS NFR BLD AUTO: 81.2 % — HIGH (ref 42.2–75.2)
NEUTROPHILS NFR BLD AUTO: 81.7 % — HIGH (ref 42.2–75.2)
NEUTROPHILS NFR BLD AUTO: 82.6 % — HIGH (ref 42.2–75.2)
NEUTROPHILS NFR BLD AUTO: 83 % — HIGH (ref 42.2–75.2)
NEUTROPHILS NFR BLD AUTO: 84.6 % — HIGH (ref 42.2–75.2)
NEUTROPHILS NFR BLD AUTO: 85.6 % — HIGH (ref 42.2–75.2)
NEUTROPHILS NFR BLD AUTO: 86.2 % — HIGH (ref 42.2–75.2)
NEUTROPHILS NFR BLD AUTO: 86.4 % — HIGH (ref 42.2–75.2)
NEUTROPHILS NFR BLD AUTO: 86.9 % — HIGH (ref 42.2–75.2)
NEUTROPHILS NFR BLD AUTO: 87.5 % — HIGH (ref 42.2–75.2)
NEUTROPHILS NFR BLD AUTO: 88.3 % — HIGH (ref 42.2–75.2)
NEUTROPHILS NFR BLD AUTO: 91.3 % — HIGH (ref 42.2–75.2)
NITRITE UR-MCNC: NEGATIVE — SIGNIFICANT CHANGE UP
NITRITE UR-MCNC: NEGATIVE — SIGNIFICANT CHANGE UP
NRBC # BLD: 0 /100 WBCS — SIGNIFICANT CHANGE UP (ref 0–0)
NT-PROBNP SERPL-SCNC: 822 PG/ML — HIGH (ref 0–300)
NT-PROBNP SERPL-SCNC: 950 PG/ML — HIGH (ref 0–300)
ORGANISM # SPEC MICROSCOPIC CNT: SIGNIFICANT CHANGE UP
PCO2 BLDA: 27 MMHG — SIGNIFICANT CHANGE UP (ref 25–48)
PCO2 BLDA: 30 MMHG — SIGNIFICANT CHANGE UP (ref 25–48)
PCO2 BLDA: 31 MMHG — SIGNIFICANT CHANGE UP (ref 25–48)
PCO2 BLDA: 32 MMHG — SIGNIFICANT CHANGE UP (ref 25–48)
PCO2 BLDA: 38 MMHG — SIGNIFICANT CHANGE UP (ref 25–48)
PCO2 BLDA: 41 MMHG — SIGNIFICANT CHANGE UP (ref 25–48)
PCO2 BLDV: 41 MMHG — SIGNIFICANT CHANGE UP (ref 39–42)
PH BLDA: 7.37 — SIGNIFICANT CHANGE UP (ref 7.35–7.45)
PH BLDA: 7.41 — SIGNIFICANT CHANGE UP (ref 7.35–7.45)
PH BLDA: 7.44 — SIGNIFICANT CHANGE UP (ref 7.35–7.45)
PH BLDA: 7.46 — HIGH (ref 7.35–7.45)
PH BLDA: 7.46 — HIGH (ref 7.35–7.45)
PH BLDA: 7.49 — HIGH (ref 7.35–7.45)
PH BLDV: 7.38 — SIGNIFICANT CHANGE UP (ref 7.32–7.43)
PH UR: 5.5 — SIGNIFICANT CHANGE UP (ref 5–8)
PH UR: 6 — SIGNIFICANT CHANGE UP (ref 5–8)
PHOSPHATE SERPL-MCNC: 1.7 MG/DL — LOW (ref 2.1–4.9)
PHOSPHATE SERPL-MCNC: 1.8 MG/DL — LOW (ref 2.1–4.9)
PHOSPHATE SERPL-MCNC: 2.1 MG/DL — SIGNIFICANT CHANGE UP (ref 2.1–4.9)
PHOSPHATE SERPL-MCNC: 2.2 MG/DL — SIGNIFICANT CHANGE UP (ref 2.1–4.9)
PHOSPHATE SERPL-MCNC: 2.3 MG/DL — SIGNIFICANT CHANGE UP (ref 2.1–4.9)
PHOSPHATE SERPL-MCNC: 2.4 MG/DL — SIGNIFICANT CHANGE UP (ref 2.1–4.9)
PHOSPHATE SERPL-MCNC: 2.5 MG/DL — SIGNIFICANT CHANGE UP (ref 2.1–4.9)
PHOSPHATE SERPL-MCNC: 2.5 MG/DL — SIGNIFICANT CHANGE UP (ref 2.1–4.9)
PHOSPHATE SERPL-MCNC: 2.6 MG/DL — SIGNIFICANT CHANGE UP (ref 2.1–4.9)
PHOSPHATE SERPL-MCNC: 2.6 MG/DL — SIGNIFICANT CHANGE UP (ref 2.1–4.9)
PHOSPHATE SERPL-MCNC: 2.7 MG/DL — SIGNIFICANT CHANGE UP (ref 2.1–4.9)
PHOSPHATE SERPL-MCNC: 2.8 MG/DL — SIGNIFICANT CHANGE UP (ref 2.1–4.9)
PHOSPHATE SERPL-MCNC: 2.8 MG/DL — SIGNIFICANT CHANGE UP (ref 2.1–4.9)
PHOSPHATE SERPL-MCNC: 2.9 MG/DL — SIGNIFICANT CHANGE UP (ref 2.1–4.9)
PHOSPHATE SERPL-MCNC: 3 MG/DL — SIGNIFICANT CHANGE UP (ref 2.1–4.9)
PHOSPHATE SERPL-MCNC: 3 MG/DL — SIGNIFICANT CHANGE UP (ref 2.1–4.9)
PHOSPHATE SERPL-MCNC: 3.1 MG/DL — SIGNIFICANT CHANGE UP (ref 2.1–4.9)
PHOSPHATE SERPL-MCNC: 3.1 MG/DL — SIGNIFICANT CHANGE UP (ref 2.1–4.9)
PHOSPHATE SERPL-MCNC: 3.4 MG/DL — SIGNIFICANT CHANGE UP (ref 2.1–4.9)
PHOSPHATE SERPL-MCNC: 3.4 MG/DL — SIGNIFICANT CHANGE UP (ref 2.1–4.9)
PHOSPHATE SERPL-MCNC: 3.5 MG/DL — SIGNIFICANT CHANGE UP (ref 2.1–4.9)
PHOSPHATE SERPL-MCNC: 3.8 MG/DL — SIGNIFICANT CHANGE UP (ref 2.1–4.9)
PHOSPHATE SERPL-MCNC: 3.9 MG/DL — SIGNIFICANT CHANGE UP (ref 2.1–4.9)
PHOSPHATE SERPL-MCNC: 4.3 MG/DL — SIGNIFICANT CHANGE UP (ref 2.1–4.9)
PHOSPHATE SERPL-MCNC: 4.5 MG/DL — SIGNIFICANT CHANGE UP (ref 2.1–4.9)
PHOSPHATE SERPL-MCNC: 4.6 MG/DL — SIGNIFICANT CHANGE UP (ref 2.1–4.9)
PHOSPHATE SERPL-MCNC: 5.2 MG/DL — HIGH (ref 2.1–4.9)
PLATELET # BLD AUTO: 257 K/UL — SIGNIFICANT CHANGE UP (ref 130–400)
PLATELET # BLD AUTO: 291 K/UL — SIGNIFICANT CHANGE UP (ref 130–400)
PLATELET # BLD AUTO: 294 K/UL — SIGNIFICANT CHANGE UP (ref 130–400)
PLATELET # BLD AUTO: 294 K/UL — SIGNIFICANT CHANGE UP (ref 130–400)
PLATELET # BLD AUTO: 301 K/UL — SIGNIFICANT CHANGE UP (ref 130–400)
PLATELET # BLD AUTO: 330 K/UL — SIGNIFICANT CHANGE UP (ref 130–400)
PLATELET # BLD AUTO: 331 K/UL — SIGNIFICANT CHANGE UP (ref 130–400)
PLATELET # BLD AUTO: 338 K/UL — SIGNIFICANT CHANGE UP (ref 130–400)
PLATELET # BLD AUTO: 344 K/UL — SIGNIFICANT CHANGE UP (ref 130–400)
PLATELET # BLD AUTO: 348 K/UL — SIGNIFICANT CHANGE UP (ref 130–400)
PLATELET # BLD AUTO: 367 K/UL — SIGNIFICANT CHANGE UP (ref 130–400)
PLATELET # BLD AUTO: 379 K/UL — SIGNIFICANT CHANGE UP (ref 130–400)
PLATELET # BLD AUTO: 380 K/UL — SIGNIFICANT CHANGE UP (ref 130–400)
PLATELET # BLD AUTO: 387 K/UL — SIGNIFICANT CHANGE UP (ref 130–400)
PLATELET # BLD AUTO: 405 K/UL — HIGH (ref 130–400)
PLATELET # BLD AUTO: 419 K/UL — HIGH (ref 130–400)
PLATELET # BLD AUTO: 434 K/UL — HIGH (ref 130–400)
PLATELET # BLD AUTO: 442 K/UL — HIGH (ref 130–400)
PLATELET # BLD AUTO: 454 K/UL — HIGH (ref 130–400)
PLATELET # BLD AUTO: 482 K/UL — HIGH (ref 130–400)
PLATELET # BLD AUTO: 511 K/UL — HIGH (ref 130–400)
PLATELET # BLD AUTO: 530 K/UL — HIGH (ref 130–400)
PLATELET # BLD AUTO: 538 K/UL — HIGH (ref 130–400)
PLATELET # BLD AUTO: 539 K/UL — HIGH (ref 130–400)
PLATELET # BLD AUTO: 561 K/UL — HIGH (ref 130–400)
PLATELET # BLD AUTO: 565 K/UL — HIGH (ref 130–400)
PLATELET # BLD AUTO: 565 K/UL — HIGH (ref 130–400)
PLATELET # BLD AUTO: 571 K/UL — HIGH (ref 130–400)
PLATELET # BLD AUTO: 590 K/UL — HIGH (ref 130–400)
PLATELET # BLD AUTO: 599 K/UL — HIGH (ref 130–400)
PLATELET # BLD AUTO: 607 K/UL — HIGH (ref 130–400)
PLATELET # BLD AUTO: 626 K/UL — HIGH (ref 130–400)
PLATELET # BLD AUTO: 674 K/UL — HIGH (ref 130–400)
PO2 BLDA: 101 MMHG — SIGNIFICANT CHANGE UP (ref 83–108)
PO2 BLDA: 124 MMHG — HIGH (ref 83–108)
PO2 BLDA: 163 MMHG — HIGH (ref 83–108)
PO2 BLDA: 85 MMHG — SIGNIFICANT CHANGE UP (ref 83–108)
PO2 BLDA: 87 MMHG — SIGNIFICANT CHANGE UP (ref 83–108)
PO2 BLDA: 89 MMHG — SIGNIFICANT CHANGE UP (ref 83–108)
PO2 BLDV: 30 MMHG — SIGNIFICANT CHANGE UP
POTASSIUM BLDV-SCNC: 4.1 MMOL/L — SIGNIFICANT CHANGE UP (ref 3.5–5.1)
POTASSIUM SERPL-MCNC: 3 MMOL/L — LOW (ref 3.5–5)
POTASSIUM SERPL-MCNC: 3.4 MMOL/L — LOW (ref 3.5–5)
POTASSIUM SERPL-MCNC: 3.5 MMOL/L — SIGNIFICANT CHANGE UP (ref 3.5–5)
POTASSIUM SERPL-MCNC: 3.7 MMOL/L — SIGNIFICANT CHANGE UP (ref 3.5–5)
POTASSIUM SERPL-MCNC: 3.8 MMOL/L — SIGNIFICANT CHANGE UP (ref 3.5–5)
POTASSIUM SERPL-MCNC: 3.8 MMOL/L — SIGNIFICANT CHANGE UP (ref 3.5–5)
POTASSIUM SERPL-MCNC: 3.9 MMOL/L — SIGNIFICANT CHANGE UP (ref 3.5–5)
POTASSIUM SERPL-MCNC: 4 MMOL/L — SIGNIFICANT CHANGE UP (ref 3.5–5)
POTASSIUM SERPL-MCNC: 4.1 MMOL/L — SIGNIFICANT CHANGE UP (ref 3.5–5)
POTASSIUM SERPL-MCNC: 4.2 MMOL/L — SIGNIFICANT CHANGE UP (ref 3.5–5)
POTASSIUM SERPL-MCNC: 4.2 MMOL/L — SIGNIFICANT CHANGE UP (ref 3.5–5)
POTASSIUM SERPL-MCNC: 4.3 MMOL/L — SIGNIFICANT CHANGE UP (ref 3.5–5)
POTASSIUM SERPL-MCNC: 4.3 MMOL/L — SIGNIFICANT CHANGE UP (ref 3.5–5)
POTASSIUM SERPL-MCNC: 4.4 MMOL/L — SIGNIFICANT CHANGE UP (ref 3.5–5)
POTASSIUM SERPL-MCNC: 4.5 MMOL/L — SIGNIFICANT CHANGE UP (ref 3.5–5)
POTASSIUM SERPL-MCNC: 4.5 MMOL/L — SIGNIFICANT CHANGE UP (ref 3.5–5)
POTASSIUM SERPL-MCNC: 4.6 MMOL/L — SIGNIFICANT CHANGE UP (ref 3.5–5)
POTASSIUM SERPL-MCNC: 4.7 MMOL/L — SIGNIFICANT CHANGE UP (ref 3.5–5)
POTASSIUM SERPL-MCNC: 4.8 MMOL/L — SIGNIFICANT CHANGE UP (ref 3.5–5)
POTASSIUM SERPL-MCNC: 6.4 MMOL/L — CRITICAL HIGH (ref 3.5–5)
POTASSIUM SERPL-SCNC: 3 MMOL/L — LOW (ref 3.5–5)
POTASSIUM SERPL-SCNC: 3.4 MMOL/L — LOW (ref 3.5–5)
POTASSIUM SERPL-SCNC: 3.5 MMOL/L — SIGNIFICANT CHANGE UP (ref 3.5–5)
POTASSIUM SERPL-SCNC: 3.7 MMOL/L — SIGNIFICANT CHANGE UP (ref 3.5–5)
POTASSIUM SERPL-SCNC: 3.8 MMOL/L — SIGNIFICANT CHANGE UP (ref 3.5–5)
POTASSIUM SERPL-SCNC: 3.8 MMOL/L — SIGNIFICANT CHANGE UP (ref 3.5–5)
POTASSIUM SERPL-SCNC: 3.9 MMOL/L — SIGNIFICANT CHANGE UP (ref 3.5–5)
POTASSIUM SERPL-SCNC: 4 MMOL/L — SIGNIFICANT CHANGE UP (ref 3.5–5)
POTASSIUM SERPL-SCNC: 4.1 MMOL/L — SIGNIFICANT CHANGE UP (ref 3.5–5)
POTASSIUM SERPL-SCNC: 4.2 MMOL/L — SIGNIFICANT CHANGE UP (ref 3.5–5)
POTASSIUM SERPL-SCNC: 4.2 MMOL/L — SIGNIFICANT CHANGE UP (ref 3.5–5)
POTASSIUM SERPL-SCNC: 4.3 MMOL/L — SIGNIFICANT CHANGE UP (ref 3.5–5)
POTASSIUM SERPL-SCNC: 4.3 MMOL/L — SIGNIFICANT CHANGE UP (ref 3.5–5)
POTASSIUM SERPL-SCNC: 4.4 MMOL/L — SIGNIFICANT CHANGE UP (ref 3.5–5)
POTASSIUM SERPL-SCNC: 4.5 MMOL/L — SIGNIFICANT CHANGE UP (ref 3.5–5)
POTASSIUM SERPL-SCNC: 4.5 MMOL/L — SIGNIFICANT CHANGE UP (ref 3.5–5)
POTASSIUM SERPL-SCNC: 4.6 MMOL/L — SIGNIFICANT CHANGE UP (ref 3.5–5)
POTASSIUM SERPL-SCNC: 4.7 MMOL/L — SIGNIFICANT CHANGE UP (ref 3.5–5)
POTASSIUM SERPL-SCNC: 4.8 MMOL/L — SIGNIFICANT CHANGE UP (ref 3.5–5)
POTASSIUM SERPL-SCNC: 6.4 MMOL/L — CRITICAL HIGH (ref 3.5–5)
PROT SERPL-MCNC: 3.2 G/DL — LOW (ref 6–8)
PROT SERPL-MCNC: 3.3 G/DL — LOW (ref 6–8)
PROT SERPL-MCNC: 3.6 G/DL — LOW (ref 6–8)
PROT SERPL-MCNC: 3.9 G/DL — LOW (ref 6–8)
PROT SERPL-MCNC: 4 G/DL — LOW (ref 6–8)
PROT SERPL-MCNC: 7.7 G/DL — SIGNIFICANT CHANGE UP (ref 6–8)
PROT UR-MCNC: ABNORMAL
PROT UR-MCNC: ABNORMAL
PROTHROM AB SERPL-ACNC: 10.5 SEC — SIGNIFICANT CHANGE UP (ref 9.95–12.87)
PROTHROM AB SERPL-ACNC: 10.7 SEC — SIGNIFICANT CHANGE UP (ref 9.95–12.87)
PROTHROM AB SERPL-ACNC: 10.8 SEC — SIGNIFICANT CHANGE UP (ref 9.95–12.87)
PROTHROM AB SERPL-ACNC: 11 SEC — SIGNIFICANT CHANGE UP (ref 9.95–12.87)
PROTHROM AB SERPL-ACNC: 11.1 SEC — SIGNIFICANT CHANGE UP (ref 9.95–12.87)
PROTHROM AB SERPL-ACNC: 11.9 SEC — SIGNIFICANT CHANGE UP (ref 9.95–12.87)
PROTHROM AB SERPL-ACNC: 13.9 SEC — HIGH (ref 9.95–12.87)
RBC # BLD: 2.46 M/UL — LOW (ref 4.2–5.4)
RBC # BLD: 2.49 M/UL — LOW (ref 4.2–5.4)
RBC # BLD: 2.52 M/UL — LOW (ref 4.2–5.4)
RBC # BLD: 2.63 M/UL — LOW (ref 4.2–5.4)
RBC # BLD: 2.75 M/UL — LOW (ref 4.2–5.4)
RBC # BLD: 2.79 M/UL — LOW (ref 4.2–5.4)
RBC # BLD: 2.8 M/UL — LOW (ref 4.2–5.4)
RBC # BLD: 2.83 M/UL — LOW (ref 4.2–5.4)
RBC # BLD: 2.84 M/UL — LOW (ref 4.2–5.4)
RBC # BLD: 2.85 M/UL — LOW (ref 4.2–5.4)
RBC # BLD: 2.87 M/UL — LOW (ref 4.2–5.4)
RBC # BLD: 2.92 M/UL — LOW (ref 4.2–5.4)
RBC # BLD: 2.94 M/UL — LOW (ref 4.2–5.4)
RBC # BLD: 2.96 M/UL — LOW (ref 4.2–5.4)
RBC # BLD: 2.98 M/UL — LOW (ref 4.2–5.4)
RBC # BLD: 3.06 M/UL — LOW (ref 4.2–5.4)
RBC # BLD: 3.11 M/UL — LOW (ref 4.2–5.4)
RBC # BLD: 3.25 M/UL — LOW (ref 4.2–5.4)
RBC # BLD: 3.25 M/UL — LOW (ref 4.2–5.4)
RBC # BLD: 3.3 M/UL — LOW (ref 4.2–5.4)
RBC # BLD: 3.32 M/UL — LOW (ref 4.2–5.4)
RBC # BLD: 3.68 M/UL — LOW (ref 4.2–5.4)
RBC # BLD: 3.75 M/UL — LOW (ref 4.2–5.4)
RBC # BLD: 3.87 M/UL — LOW (ref 4.2–5.4)
RBC # BLD: 3.92 M/UL — LOW (ref 4.2–5.4)
RBC # BLD: 3.94 M/UL — LOW (ref 4.2–5.4)
RBC # BLD: 3.94 M/UL — LOW (ref 4.2–5.4)
RBC # BLD: 4.15 M/UL — LOW (ref 4.2–5.4)
RBC # BLD: 4.44 M/UL — SIGNIFICANT CHANGE UP (ref 4.2–5.4)
RBC # BLD: 4.5 M/UL — SIGNIFICANT CHANGE UP (ref 4.2–5.4)
RBC # BLD: 4.71 M/UL — SIGNIFICANT CHANGE UP (ref 4.2–5.4)
RBC # BLD: 4.75 M/UL — SIGNIFICANT CHANGE UP (ref 4.2–5.4)
RBC # BLD: 4.91 M/UL — SIGNIFICANT CHANGE UP (ref 4.2–5.4)
RBC # FLD: 13.7 % — SIGNIFICANT CHANGE UP (ref 11.5–14.5)
RBC # FLD: 13.8 % — SIGNIFICANT CHANGE UP (ref 11.5–14.5)
RBC # FLD: 13.8 % — SIGNIFICANT CHANGE UP (ref 11.5–14.5)
RBC # FLD: 13.9 % — SIGNIFICANT CHANGE UP (ref 11.5–14.5)
RBC # FLD: 13.9 % — SIGNIFICANT CHANGE UP (ref 11.5–14.5)
RBC # FLD: 14 % — SIGNIFICANT CHANGE UP (ref 11.5–14.5)
RBC # FLD: 14.1 % — SIGNIFICANT CHANGE UP (ref 11.5–14.5)
RBC # FLD: 14.2 % — SIGNIFICANT CHANGE UP (ref 11.5–14.5)
RBC # FLD: 14.3 % — SIGNIFICANT CHANGE UP (ref 11.5–14.5)
RBC # FLD: 14.6 % — HIGH (ref 11.5–14.5)
RBC # FLD: 14.7 % — HIGH (ref 11.5–14.5)
RBC # FLD: 14.9 % — HIGH (ref 11.5–14.5)
RBC # FLD: 15 % — HIGH (ref 11.5–14.5)
RBC # FLD: 15 % — HIGH (ref 11.5–14.5)
RBC # FLD: 15.2 % — HIGH (ref 11.5–14.5)
RBC # FLD: 15.6 % — HIGH (ref 11.5–14.5)
RBC # FLD: 15.8 % — HIGH (ref 11.5–14.5)
RBC # FLD: 15.9 % — HIGH (ref 11.5–14.5)
RBC # FLD: 16 % — HIGH (ref 11.5–14.5)
RBC # FLD: 16.1 % — HIGH (ref 11.5–14.5)
RBC # FLD: 16.3 % — HIGH (ref 11.5–14.5)
RBC # FLD: 16.4 % — HIGH (ref 11.5–14.5)
RBC # FLD: 16.6 % — HIGH (ref 11.5–14.5)
RBC CASTS # UR COMP ASSIST: 2 /HPF — SIGNIFICANT CHANGE UP (ref 0–4)
RBC CASTS # UR COMP ASSIST: 8 /HPF — HIGH (ref 0–4)
RSV RNA NPH QL NAA+NON-PROBE: SIGNIFICANT CHANGE UP
SAO2 % BLDA: 100 % — HIGH (ref 94–98)
SAO2 % BLDA: 100 % — HIGH (ref 94–98)
SAO2 % BLDA: 98.1 % — HIGH (ref 94–98)
SAO2 % BLDA: 98.7 % — HIGH (ref 94–98)
SAO2 % BLDA: 99 % — HIGH (ref 94–98)
SAO2 % BLDA: 99.3 % — HIGH (ref 94–98)
SAO2 % BLDV: 43.2 % — SIGNIFICANT CHANGE UP
SARS-COV-2 RNA SPEC QL NAA+PROBE: SIGNIFICANT CHANGE UP
SODIUM SERPL-SCNC: 133 MMOL/L — LOW (ref 135–146)
SODIUM SERPL-SCNC: 134 MMOL/L — LOW (ref 135–146)
SODIUM SERPL-SCNC: 135 MMOL/L — SIGNIFICANT CHANGE UP (ref 135–146)
SODIUM SERPL-SCNC: 136 MMOL/L — SIGNIFICANT CHANGE UP (ref 135–146)
SODIUM SERPL-SCNC: 137 MMOL/L — SIGNIFICANT CHANGE UP (ref 135–146)
SODIUM SERPL-SCNC: 138 MMOL/L — SIGNIFICANT CHANGE UP (ref 135–146)
SODIUM SERPL-SCNC: 139 MMOL/L — SIGNIFICANT CHANGE UP (ref 135–146)
SODIUM SERPL-SCNC: 140 MMOL/L — SIGNIFICANT CHANGE UP (ref 135–146)
SODIUM SERPL-SCNC: 140 MMOL/L — SIGNIFICANT CHANGE UP (ref 135–146)
SODIUM SERPL-SCNC: 141 MMOL/L — SIGNIFICANT CHANGE UP (ref 135–146)
SODIUM SERPL-SCNC: 142 MMOL/L — SIGNIFICANT CHANGE UP (ref 135–146)
SODIUM SERPL-SCNC: 143 MMOL/L — SIGNIFICANT CHANGE UP (ref 135–146)
SODIUM SERPL-SCNC: 144 MMOL/L — SIGNIFICANT CHANGE UP (ref 135–146)
SODIUM SERPL-SCNC: 147 MMOL/L — HIGH (ref 135–146)
SODIUM UR-SCNC: <20 MMOL/L — SIGNIFICANT CHANGE UP
SP GR SPEC: 1.04 — HIGH (ref 1.01–1.03)
SP GR SPEC: >1.05 (ref 1.01–1.03)
SPECIMEN SOURCE: SIGNIFICANT CHANGE UP
SURGICAL PATHOLOGY STUDY: SIGNIFICANT CHANGE UP
TRIGL SERPL-MCNC: 196 MG/DL — HIGH
TROPONIN T SERPL-MCNC: <0.01 NG/ML — SIGNIFICANT CHANGE UP
UROBILINOGEN FLD QL: ABNORMAL
UROBILINOGEN FLD QL: SIGNIFICANT CHANGE UP
WBC # BLD: 10.38 K/UL — SIGNIFICANT CHANGE UP (ref 4.8–10.8)
WBC # BLD: 10.51 K/UL — SIGNIFICANT CHANGE UP (ref 4.8–10.8)
WBC # BLD: 10.51 K/UL — SIGNIFICANT CHANGE UP (ref 4.8–10.8)
WBC # BLD: 10.53 K/UL — SIGNIFICANT CHANGE UP (ref 4.8–10.8)
WBC # BLD: 10.53 K/UL — SIGNIFICANT CHANGE UP (ref 4.8–10.8)
WBC # BLD: 11.08 K/UL — HIGH (ref 4.8–10.8)
WBC # BLD: 11.33 K/UL — HIGH (ref 4.8–10.8)
WBC # BLD: 11.65 K/UL — HIGH (ref 4.8–10.8)
WBC # BLD: 11.77 K/UL — HIGH (ref 4.8–10.8)
WBC # BLD: 14.02 K/UL — HIGH (ref 4.8–10.8)
WBC # BLD: 14.29 K/UL — HIGH (ref 4.8–10.8)
WBC # BLD: 14.47 K/UL — HIGH (ref 4.8–10.8)
WBC # BLD: 14.79 K/UL — HIGH (ref 4.8–10.8)
WBC # BLD: 14.82 K/UL — HIGH (ref 4.8–10.8)
WBC # BLD: 15.52 K/UL — HIGH (ref 4.8–10.8)
WBC # BLD: 15.88 K/UL — HIGH (ref 4.8–10.8)
WBC # BLD: 16.04 K/UL — HIGH (ref 4.8–10.8)
WBC # BLD: 16.33 K/UL — HIGH (ref 4.8–10.8)
WBC # BLD: 16.47 K/UL — HIGH (ref 4.8–10.8)
WBC # BLD: 17.2 K/UL — HIGH (ref 4.8–10.8)
WBC # BLD: 17.33 K/UL — HIGH (ref 4.8–10.8)
WBC # BLD: 18.23 K/UL — HIGH (ref 4.8–10.8)
WBC # BLD: 18.63 K/UL — HIGH (ref 4.8–10.8)
WBC # BLD: 19.25 K/UL — HIGH (ref 4.8–10.8)
WBC # BLD: 20.01 K/UL — HIGH (ref 4.8–10.8)
WBC # BLD: 20.08 K/UL — HIGH (ref 4.8–10.8)
WBC # BLD: 26.7 K/UL — HIGH (ref 4.8–10.8)
WBC # BLD: 5.86 K/UL — SIGNIFICANT CHANGE UP (ref 4.8–10.8)
WBC # BLD: 6.68 K/UL — SIGNIFICANT CHANGE UP (ref 4.8–10.8)
WBC # BLD: 7.01 K/UL — SIGNIFICANT CHANGE UP (ref 4.8–10.8)
WBC # BLD: 8.37 K/UL — SIGNIFICANT CHANGE UP (ref 4.8–10.8)
WBC # BLD: 8.43 K/UL — SIGNIFICANT CHANGE UP (ref 4.8–10.8)
WBC # BLD: 9.87 K/UL — SIGNIFICANT CHANGE UP (ref 4.8–10.8)
WBC # FLD AUTO: 10.38 K/UL — SIGNIFICANT CHANGE UP (ref 4.8–10.8)
WBC # FLD AUTO: 10.51 K/UL — SIGNIFICANT CHANGE UP (ref 4.8–10.8)
WBC # FLD AUTO: 10.51 K/UL — SIGNIFICANT CHANGE UP (ref 4.8–10.8)
WBC # FLD AUTO: 10.53 K/UL — SIGNIFICANT CHANGE UP (ref 4.8–10.8)
WBC # FLD AUTO: 10.53 K/UL — SIGNIFICANT CHANGE UP (ref 4.8–10.8)
WBC # FLD AUTO: 11.08 K/UL — HIGH (ref 4.8–10.8)
WBC # FLD AUTO: 11.33 K/UL — HIGH (ref 4.8–10.8)
WBC # FLD AUTO: 11.65 K/UL — HIGH (ref 4.8–10.8)
WBC # FLD AUTO: 11.77 K/UL — HIGH (ref 4.8–10.8)
WBC # FLD AUTO: 14.02 K/UL — HIGH (ref 4.8–10.8)
WBC # FLD AUTO: 14.29 K/UL — HIGH (ref 4.8–10.8)
WBC # FLD AUTO: 14.47 K/UL — HIGH (ref 4.8–10.8)
WBC # FLD AUTO: 14.79 K/UL — HIGH (ref 4.8–10.8)
WBC # FLD AUTO: 14.82 K/UL — HIGH (ref 4.8–10.8)
WBC # FLD AUTO: 15.52 K/UL — HIGH (ref 4.8–10.8)
WBC # FLD AUTO: 15.88 K/UL — HIGH (ref 4.8–10.8)
WBC # FLD AUTO: 16.04 K/UL — HIGH (ref 4.8–10.8)
WBC # FLD AUTO: 16.33 K/UL — HIGH (ref 4.8–10.8)
WBC # FLD AUTO: 16.47 K/UL — HIGH (ref 4.8–10.8)
WBC # FLD AUTO: 17.2 K/UL — HIGH (ref 4.8–10.8)
WBC # FLD AUTO: 17.33 K/UL — HIGH (ref 4.8–10.8)
WBC # FLD AUTO: 18.23 K/UL — HIGH (ref 4.8–10.8)
WBC # FLD AUTO: 18.63 K/UL — HIGH (ref 4.8–10.8)
WBC # FLD AUTO: 19.25 K/UL — HIGH (ref 4.8–10.8)
WBC # FLD AUTO: 20.01 K/UL — HIGH (ref 4.8–10.8)
WBC # FLD AUTO: 20.08 K/UL — HIGH (ref 4.8–10.8)
WBC # FLD AUTO: 26.7 K/UL — HIGH (ref 4.8–10.8)
WBC # FLD AUTO: 5.86 K/UL — SIGNIFICANT CHANGE UP (ref 4.8–10.8)
WBC # FLD AUTO: 6.68 K/UL — SIGNIFICANT CHANGE UP (ref 4.8–10.8)
WBC # FLD AUTO: 7.01 K/UL — SIGNIFICANT CHANGE UP (ref 4.8–10.8)
WBC # FLD AUTO: 8.37 K/UL — SIGNIFICANT CHANGE UP (ref 4.8–10.8)
WBC # FLD AUTO: 8.43 K/UL — SIGNIFICANT CHANGE UP (ref 4.8–10.8)
WBC # FLD AUTO: 9.87 K/UL — SIGNIFICANT CHANGE UP (ref 4.8–10.8)
WBC UR QL: 10 /HPF — HIGH (ref 0–5)
WBC UR QL: 5 /HPF — SIGNIFICANT CHANGE UP (ref 0–5)

## 2023-01-01 PROCEDURE — 99291 CRITICAL CARE FIRST HOUR: CPT | Mod: 24

## 2023-01-01 PROCEDURE — 49002 REOPENING OF ABDOMEN: CPT | Mod: 58

## 2023-01-01 PROCEDURE — 0042T: CPT

## 2023-01-01 PROCEDURE — 71045 X-RAY EXAM CHEST 1 VIEW: CPT | Mod: 26

## 2023-01-01 PROCEDURE — 99291 CRITICAL CARE FIRST HOUR: CPT

## 2023-01-01 PROCEDURE — P9045: CPT

## 2023-01-01 PROCEDURE — 36620 INSERTION CATHETER ARTERY: CPT

## 2023-01-01 PROCEDURE — 85025 COMPLETE CBC W/AUTO DIFF WBC: CPT

## 2023-01-01 PROCEDURE — 84478 ASSAY OF TRIGLYCERIDES: CPT

## 2023-01-01 PROCEDURE — 99233 SBSQ HOSP IP/OBS HIGH 50: CPT | Mod: 24,25

## 2023-01-01 PROCEDURE — 97608 NEG PRS WND THER NDME>50SQCM: CPT

## 2023-01-01 PROCEDURE — 70498 CT ANGIOGRAPHY NECK: CPT

## 2023-01-01 PROCEDURE — 74021 RADEX ABDOMEN 3+ VIEWS: CPT

## 2023-01-01 PROCEDURE — 85018 HEMOGLOBIN: CPT

## 2023-01-01 PROCEDURE — 93306 TTE W/DOPPLER COMPLETE: CPT | Mod: 26

## 2023-01-01 PROCEDURE — 84132 ASSAY OF SERUM POTASSIUM: CPT

## 2023-01-01 PROCEDURE — 83735 ASSAY OF MAGNESIUM: CPT

## 2023-01-01 PROCEDURE — 94003 VENT MGMT INPAT SUBQ DAY: CPT

## 2023-01-01 PROCEDURE — C1889: CPT

## 2023-01-01 PROCEDURE — 71045 X-RAY EXAM CHEST 1 VIEW: CPT | Mod: 26,77

## 2023-01-01 PROCEDURE — 70450 CT HEAD/BRAIN W/O DYE: CPT | Mod: 26

## 2023-01-01 PROCEDURE — 99232 SBSQ HOSP IP/OBS MODERATE 35: CPT

## 2023-01-01 PROCEDURE — 70496 CT ANGIOGRAPHY HEAD: CPT | Mod: 26

## 2023-01-01 PROCEDURE — 85027 COMPLETE CBC AUTOMATED: CPT

## 2023-01-01 PROCEDURE — 13160 SEC CLSR SURG WND/DEHSN XTN: CPT | Mod: 52,58

## 2023-01-01 PROCEDURE — 81001 URINALYSIS AUTO W/SCOPE: CPT

## 2023-01-01 PROCEDURE — 82803 BLOOD GASES ANY COMBINATION: CPT

## 2023-01-01 PROCEDURE — 36430 TRANSFUSION BLD/BLD COMPNT: CPT

## 2023-01-01 PROCEDURE — 82570 ASSAY OF URINE CREATININE: CPT

## 2023-01-01 PROCEDURE — 88342 IMHCHEM/IMCYTCHM 1ST ANTB: CPT

## 2023-01-01 PROCEDURE — 76937 US GUIDE VASCULAR ACCESS: CPT | Mod: 26,59

## 2023-01-01 PROCEDURE — 93010 ELECTROCARDIOGRAM REPORT: CPT

## 2023-01-01 PROCEDURE — 99223 1ST HOSP IP/OBS HIGH 75: CPT

## 2023-01-01 PROCEDURE — 70498 CT ANGIOGRAPHY NECK: CPT | Mod: 26

## 2023-01-01 PROCEDURE — 82553 CREATINE MB FRACTION: CPT

## 2023-01-01 PROCEDURE — 86901 BLOOD TYPING SEROLOGIC RH(D): CPT

## 2023-01-01 PROCEDURE — U0005: CPT

## 2023-01-01 PROCEDURE — 99291 CRITICAL CARE FIRST HOUR: CPT | Mod: 24,57

## 2023-01-01 PROCEDURE — 74021 RADEX ABDOMEN 3+ VIEWS: CPT | Mod: 26

## 2023-01-01 PROCEDURE — 87086 URINE CULTURE/COLONY COUNT: CPT

## 2023-01-01 PROCEDURE — 99233 SBSQ HOSP IP/OBS HIGH 50: CPT

## 2023-01-01 PROCEDURE — 71046 X-RAY EXAM CHEST 2 VIEWS: CPT | Mod: 26

## 2023-01-01 PROCEDURE — 84100 ASSAY OF PHOSPHORUS: CPT

## 2023-01-01 PROCEDURE — C1781: CPT

## 2023-01-01 PROCEDURE — 80076 HEPATIC FUNCTION PANEL: CPT

## 2023-01-01 PROCEDURE — 88305 TISSUE EXAM BY PATHOLOGIST: CPT

## 2023-01-01 PROCEDURE — 88307 TISSUE EXAM BY PATHOLOGIST: CPT

## 2023-01-01 PROCEDURE — 70450 CT HEAD/BRAIN W/O DYE: CPT

## 2023-01-01 PROCEDURE — 82550 ASSAY OF CK (CPK): CPT

## 2023-01-01 PROCEDURE — 87040 BLOOD CULTURE FOR BACTERIA: CPT

## 2023-01-01 PROCEDURE — 99222 1ST HOSP IP/OBS MODERATE 55: CPT

## 2023-01-01 PROCEDURE — 83605 ASSAY OF LACTIC ACID: CPT

## 2023-01-01 PROCEDURE — 43246 EGD PLACE GASTROSTOMY TUBE: CPT | Mod: 58

## 2023-01-01 PROCEDURE — A9579: CPT

## 2023-01-01 PROCEDURE — 93306 TTE W/DOPPLER COMPLETE: CPT

## 2023-01-01 PROCEDURE — 94002 VENT MGMT INPAT INIT DAY: CPT

## 2023-01-01 PROCEDURE — 84295 ASSAY OF SERUM SODIUM: CPT

## 2023-01-01 PROCEDURE — 70496 CT ANGIOGRAPHY HEAD: CPT

## 2023-01-01 PROCEDURE — 86850 RBC ANTIBODY SCREEN: CPT

## 2023-01-01 PROCEDURE — 95700 EEG CONT REC W/VID EEG TECH: CPT

## 2023-01-01 PROCEDURE — P9016: CPT

## 2023-01-01 PROCEDURE — 85014 HEMATOCRIT: CPT

## 2023-01-01 PROCEDURE — C9113: CPT

## 2023-01-01 PROCEDURE — 70553 MRI BRAIN STEM W/O & W/DYE: CPT

## 2023-01-01 PROCEDURE — 49000 EXPLORATION OF ABDOMEN: CPT | Mod: 58

## 2023-01-01 PROCEDURE — 84300 ASSAY OF URINE SODIUM: CPT

## 2023-01-01 PROCEDURE — 44141 PARTIAL REMOVAL OF COLON: CPT | Mod: GC

## 2023-01-01 PROCEDURE — 36415 COLL VENOUS BLD VENIPUNCTURE: CPT

## 2023-01-01 PROCEDURE — 88341 IMHCHEM/IMCYTCHM EA ADD ANTB: CPT

## 2023-01-01 PROCEDURE — L8699: CPT

## 2023-01-01 PROCEDURE — 80048 BASIC METABOLIC PNL TOTAL CA: CPT

## 2023-01-01 PROCEDURE — 87205 SMEAR GRAM STAIN: CPT

## 2023-01-01 PROCEDURE — 70553 MRI BRAIN STEM W/O & W/DYE: CPT | Mod: 26

## 2023-01-01 PROCEDURE — 85610 PROTHROMBIN TIME: CPT

## 2023-01-01 PROCEDURE — 36556 INSERT NON-TUNNEL CV CATH: CPT

## 2023-01-01 PROCEDURE — 71045 X-RAY EXAM CHEST 1 VIEW: CPT

## 2023-01-01 PROCEDURE — 99221 1ST HOSP IP/OBS SF/LOW 40: CPT

## 2023-01-01 PROCEDURE — 87184 SC STD DISK METHOD PER PLATE: CPT

## 2023-01-01 PROCEDURE — 87070 CULTURE OTHR SPECIMN AEROBIC: CPT

## 2023-01-01 PROCEDURE — 93005 ELECTROCARDIOGRAM TRACING: CPT

## 2023-01-01 PROCEDURE — 86900 BLOOD TYPING SEROLOGIC ABO: CPT

## 2023-01-01 PROCEDURE — 87077 CULTURE AEROBIC IDENTIFY: CPT

## 2023-01-01 PROCEDURE — 74177 CT ABD & PELVIS W/CONTRAST: CPT | Mod: 26,MA

## 2023-01-01 PROCEDURE — 82330 ASSAY OF CALCIUM: CPT

## 2023-01-01 PROCEDURE — 85730 THROMBOPLASTIN TIME PARTIAL: CPT

## 2023-01-01 PROCEDURE — 87075 CULTR BACTERIA EXCEPT BLOOD: CPT

## 2023-01-01 PROCEDURE — P9040: CPT

## 2023-01-01 PROCEDURE — 86923 COMPATIBILITY TEST ELECTRIC: CPT

## 2023-01-01 PROCEDURE — C1751: CPT

## 2023-01-01 PROCEDURE — 83880 ASSAY OF NATRIURETIC PEPTIDE: CPT

## 2023-01-01 PROCEDURE — 84484 ASSAY OF TROPONIN QUANT: CPT

## 2023-01-01 PROCEDURE — 87186 SC STD MICRODIL/AGAR DIL: CPT

## 2023-01-01 PROCEDURE — 95714 VEEG EA 12-26 HR UNMNTR: CPT

## 2023-01-01 PROCEDURE — 94760 N-INVAS EAR/PLS OXIMETRY 1: CPT

## 2023-01-01 PROCEDURE — 82962 GLUCOSE BLOOD TEST: CPT

## 2023-01-01 PROCEDURE — U0003: CPT

## 2023-01-01 RX ORDER — NOREPINEPHRINE BITARTRATE/D5W 8 MG/250ML
0.01 PLASTIC BAG, INJECTION (ML) INTRAVENOUS
Qty: 8 | Refills: 0 | Status: DISCONTINUED | OUTPATIENT
Start: 2023-01-01 | End: 2023-01-01

## 2023-01-01 RX ORDER — SODIUM CHLORIDE 9 MG/ML
1000 INJECTION, SOLUTION INTRAVENOUS
Refills: 0 | Status: DISCONTINUED | OUTPATIENT
Start: 2023-01-01 | End: 2023-01-01

## 2023-01-01 RX ORDER — FENTANYL CITRATE 50 UG/ML
25 INJECTION INTRAVENOUS EVERY 4 HOURS
Refills: 0 | Status: DISCONTINUED | OUTPATIENT
Start: 2023-01-01 | End: 2023-01-01

## 2023-01-01 RX ORDER — POTASSIUM PHOSPHATE, MONOBASIC POTASSIUM PHOSPHATE, DIBASIC 236; 224 MG/ML; MG/ML
30 INJECTION, SOLUTION INTRAVENOUS ONCE
Refills: 0 | Status: COMPLETED | OUTPATIENT
Start: 2023-01-01 | End: 2023-01-01

## 2023-01-01 RX ORDER — HEPARIN SODIUM 5000 [USP'U]/ML
5000 INJECTION INTRAVENOUS; SUBCUTANEOUS EVERY 8 HOURS
Refills: 0 | Status: DISCONTINUED | OUTPATIENT
Start: 2023-01-01 | End: 2023-01-01

## 2023-01-01 RX ORDER — FENTANYL CITRATE 50 UG/ML
25 INJECTION INTRAVENOUS
Refills: 0 | Status: DISCONTINUED | OUTPATIENT
Start: 2023-01-01 | End: 2023-01-01

## 2023-01-01 RX ORDER — SODIUM CHLORIDE 9 MG/ML
250 INJECTION, SOLUTION INTRAVENOUS ONCE
Refills: 0 | Status: COMPLETED | OUTPATIENT
Start: 2023-01-01 | End: 2023-01-01

## 2023-01-01 RX ORDER — MAGNESIUM SULFATE 500 MG/ML
1 VIAL (ML) INJECTION ONCE
Refills: 0 | Status: COMPLETED | OUTPATIENT
Start: 2023-01-01 | End: 2023-01-01

## 2023-01-01 RX ORDER — PROPOFOL 10 MG/ML
20 INJECTION, EMULSION INTRAVENOUS
Qty: 500 | Refills: 0 | Status: DISCONTINUED | OUTPATIENT
Start: 2023-01-01 | End: 2023-01-01

## 2023-01-01 RX ORDER — POTASSIUM CHLORIDE 20 MEQ
20 PACKET (EA) ORAL ONCE
Refills: 0 | Status: COMPLETED | OUTPATIENT
Start: 2023-01-01 | End: 2023-01-01

## 2023-01-01 RX ORDER — MIDAZOLAM HYDROCHLORIDE 1 MG/ML
2000 INJECTION, SOLUTION INTRAMUSCULAR; INTRAVENOUS ONCE
Refills: 0 | Status: DISCONTINUED | OUTPATIENT
Start: 2023-01-01 | End: 2023-01-01

## 2023-01-01 RX ORDER — SODIUM CHLORIDE 9 MG/ML
500 INJECTION, SOLUTION INTRAVENOUS ONCE
Refills: 0 | Status: COMPLETED | OUTPATIENT
Start: 2023-01-01 | End: 2023-01-01

## 2023-01-01 RX ORDER — DEXMEDETOMIDINE HYDROCHLORIDE IN 0.9% SODIUM CHLORIDE 4 UG/ML
0.1 INJECTION INTRAVENOUS
Qty: 400 | Refills: 0 | Status: DISCONTINUED | OUTPATIENT
Start: 2023-01-01 | End: 2023-01-01

## 2023-01-01 RX ORDER — FENTANYL CITRATE 50 UG/ML
25 INJECTION INTRAVENOUS EVERY 6 HOURS
Refills: 0 | Status: DISCONTINUED | OUTPATIENT
Start: 2023-01-01 | End: 2023-01-01

## 2023-01-01 RX ORDER — ATORVASTATIN CALCIUM 80 MG/1
80 TABLET, FILM COATED ORAL AT BEDTIME
Refills: 0 | Status: DISCONTINUED | OUTPATIENT
Start: 2023-01-01 | End: 2023-01-01

## 2023-01-01 RX ORDER — SODIUM CHLORIDE 9 MG/ML
1000 INJECTION, SOLUTION INTRAVENOUS ONCE
Refills: 0 | Status: COMPLETED | OUTPATIENT
Start: 2023-01-01 | End: 2023-01-01

## 2023-01-01 RX ORDER — ELECTROLYTE SOLUTION,INJ
1 VIAL (ML) INTRAVENOUS
Refills: 0 | Status: DISCONTINUED | OUTPATIENT
Start: 2023-01-01 | End: 2023-01-01

## 2023-01-01 RX ORDER — ONDANSETRON 8 MG/1
4 TABLET, FILM COATED ORAL ONCE
Refills: 0 | Status: COMPLETED | OUTPATIENT
Start: 2023-01-01 | End: 2023-01-01

## 2023-01-01 RX ORDER — PIPERACILLIN AND TAZOBACTAM 4; .5 G/20ML; G/20ML
3.38 INJECTION, POWDER, LYOPHILIZED, FOR SOLUTION INTRAVENOUS EVERY 8 HOURS
Refills: 0 | Status: COMPLETED | OUTPATIENT
Start: 2023-01-01 | End: 2023-01-01

## 2023-01-01 RX ORDER — SODIUM CHLORIDE 9 MG/ML
1000 INJECTION INTRAMUSCULAR; INTRAVENOUS; SUBCUTANEOUS
Refills: 0 | Status: DISCONTINUED | OUTPATIENT
Start: 2023-01-01 | End: 2023-01-01

## 2023-01-01 RX ORDER — SODIUM CHLORIDE 9 MG/ML
250 INJECTION INTRAMUSCULAR; INTRAVENOUS; SUBCUTANEOUS ONCE
Refills: 0 | Status: COMPLETED | OUTPATIENT
Start: 2023-01-01 | End: 2023-01-01

## 2023-01-01 RX ORDER — FUROSEMIDE 40 MG
60 TABLET ORAL ONCE
Refills: 0 | Status: COMPLETED | OUTPATIENT
Start: 2023-01-01 | End: 2023-01-01

## 2023-01-01 RX ORDER — FUROSEMIDE 40 MG
40 TABLET ORAL ONCE
Refills: 0 | Status: COMPLETED | OUTPATIENT
Start: 2023-01-01 | End: 2023-01-01

## 2023-01-01 RX ORDER — ACETAMINOPHEN 500 MG
650 TABLET ORAL EVERY 6 HOURS
Refills: 0 | Status: DISCONTINUED | OUTPATIENT
Start: 2023-01-01 | End: 2023-01-01

## 2023-01-01 RX ORDER — SIMVASTATIN 20 MG/1
20 TABLET, FILM COATED ORAL AT BEDTIME
Refills: 0 | Status: DISCONTINUED | OUTPATIENT
Start: 2023-01-01 | End: 2023-01-01

## 2023-01-01 RX ORDER — SODIUM CHLORIDE 9 MG/ML
500 INJECTION INTRAMUSCULAR; INTRAVENOUS; SUBCUTANEOUS ONCE
Refills: 0 | Status: COMPLETED | OUTPATIENT
Start: 2023-01-01 | End: 2023-01-01

## 2023-01-01 RX ORDER — ONDANSETRON 8 MG/1
4 TABLET, FILM COATED ORAL EVERY 6 HOURS
Refills: 0 | Status: DISCONTINUED | OUTPATIENT
Start: 2023-01-01 | End: 2023-01-01

## 2023-01-01 RX ORDER — LISINOPRIL 2.5 MG/1
20 TABLET ORAL DAILY
Refills: 0 | Status: DISCONTINUED | OUTPATIENT
Start: 2023-01-01 | End: 2023-01-01

## 2023-01-01 RX ORDER — HYDROMORPHONE HYDROCHLORIDE 2 MG/ML
1 INJECTION INTRAMUSCULAR; INTRAVENOUS; SUBCUTANEOUS
Refills: 0 | Status: DISCONTINUED | OUTPATIENT
Start: 2023-01-01 | End: 2023-01-01

## 2023-01-01 RX ORDER — PANTOPRAZOLE SODIUM 20 MG/1
40 TABLET, DELAYED RELEASE ORAL EVERY 24 HOURS
Refills: 0 | Status: DISCONTINUED | OUTPATIENT
Start: 2023-01-01 | End: 2023-01-01

## 2023-01-01 RX ORDER — MIDAZOLAM HYDROCHLORIDE 1 MG/ML
1 INJECTION, SOLUTION INTRAMUSCULAR; INTRAVENOUS
Refills: 0 | Status: DISCONTINUED | OUTPATIENT
Start: 2023-01-01 | End: 2023-01-01

## 2023-01-01 RX ORDER — CALCIUM GLUCONATE 100 MG/ML
2 VIAL (ML) INTRAVENOUS ONCE
Refills: 0 | Status: COMPLETED | OUTPATIENT
Start: 2023-01-01 | End: 2023-01-01

## 2023-01-01 RX ORDER — PROPOFOL 10 MG/ML
5 INJECTION, EMULSION INTRAVENOUS
Qty: 1000 | Refills: 0 | Status: DISCONTINUED | OUTPATIENT
Start: 2023-01-01 | End: 2023-01-01

## 2023-01-01 RX ORDER — ALBUMIN HUMAN 25 %
250 VIAL (ML) INTRAVENOUS EVERY 6 HOURS
Refills: 0 | Status: COMPLETED | OUTPATIENT
Start: 2023-01-01 | End: 2023-01-01

## 2023-01-01 RX ORDER — ASPIRIN/CALCIUM CARB/MAGNESIUM 324 MG
300 TABLET ORAL
Refills: 0 | Status: DISCONTINUED | OUTPATIENT
Start: 2023-01-01 | End: 2023-01-01

## 2023-01-01 RX ORDER — AMLODIPINE BESYLATE 2.5 MG/1
5 TABLET ORAL EVERY 24 HOURS
Refills: 0 | Status: DISCONTINUED | OUTPATIENT
Start: 2023-01-01 | End: 2023-01-01

## 2023-01-01 RX ORDER — DIPHENHYDRAMINE HCL 50 MG
25 CAPSULE ORAL ONCE
Refills: 0 | Status: DISCONTINUED | OUTPATIENT
Start: 2023-01-01 | End: 2023-01-01

## 2023-01-01 RX ORDER — MIDAZOLAM HYDROCHLORIDE 1 MG/ML
2 INJECTION, SOLUTION INTRAMUSCULAR; INTRAVENOUS ONCE
Refills: 0 | Status: DISCONTINUED | OUTPATIENT
Start: 2023-01-01 | End: 2023-01-01

## 2023-01-01 RX ORDER — MAGNESIUM SULFATE 500 MG/ML
2 VIAL (ML) INJECTION ONCE
Refills: 0 | Status: COMPLETED | OUTPATIENT
Start: 2023-01-01 | End: 2023-01-01

## 2023-01-01 RX ORDER — OXYCODONE HYDROCHLORIDE 5 MG/1
5 TABLET ORAL EVERY 4 HOURS
Refills: 0 | Status: DISCONTINUED | OUTPATIENT
Start: 2023-01-01 | End: 2023-01-01

## 2023-01-01 RX ORDER — PHENYLEPHRINE HYDROCHLORIDE 10 MG/ML
0.5 INJECTION INTRAVENOUS
Qty: 40 | Refills: 0 | Status: DISCONTINUED | OUTPATIENT
Start: 2023-01-01 | End: 2023-01-01

## 2023-01-01 RX ORDER — POTASSIUM CHLORIDE 20 MEQ
10 PACKET (EA) ORAL ONCE
Refills: 0 | Status: DISCONTINUED | OUTPATIENT
Start: 2023-01-01 | End: 2023-01-01

## 2023-01-01 RX ORDER — MAGNESIUM SULFATE 500 MG/ML
2 VIAL (ML) INJECTION
Refills: 0 | Status: COMPLETED | OUTPATIENT
Start: 2023-01-01 | End: 2023-01-01

## 2023-01-01 RX ORDER — FUROSEMIDE 40 MG
20 TABLET ORAL ONCE
Refills: 0 | Status: COMPLETED | OUTPATIENT
Start: 2023-01-01 | End: 2023-01-01

## 2023-01-01 RX ORDER — I.V. FAT EMULSION 20 G/100ML
1.22 EMULSION INTRAVENOUS
Qty: 100.02 | Refills: 0 | Status: DISCONTINUED | OUTPATIENT
Start: 2023-01-01 | End: 2023-01-01

## 2023-01-01 RX ORDER — AMLODIPINE BESYLATE 2.5 MG/1
5 TABLET ORAL DAILY
Refills: 0 | Status: DISCONTINUED | OUTPATIENT
Start: 2023-01-01 | End: 2023-01-01

## 2023-01-01 RX ORDER — POTASSIUM CHLORIDE 20 MEQ
20 PACKET (EA) ORAL
Refills: 0 | Status: COMPLETED | OUTPATIENT
Start: 2023-01-01 | End: 2023-01-01

## 2023-01-01 RX ORDER — BENZOCAINE 10 %
1 GEL (GRAM) MUCOUS MEMBRANE ONCE
Refills: 0 | Status: COMPLETED | OUTPATIENT
Start: 2023-01-01 | End: 2023-01-01

## 2023-01-01 RX ORDER — NOREPINEPHRINE BITARTRATE/D5W 8 MG/250ML
0.05 PLASTIC BAG, INJECTION (ML) INTRAVENOUS
Qty: 16 | Refills: 0 | Status: DISCONTINUED | OUTPATIENT
Start: 2023-01-01 | End: 2023-01-01

## 2023-01-01 RX ORDER — POTASSIUM PHOSPHATE, MONOBASIC POTASSIUM PHOSPHATE, DIBASIC 236; 224 MG/ML; MG/ML
15 INJECTION, SOLUTION INTRAVENOUS ONCE
Refills: 0 | Status: COMPLETED | OUTPATIENT
Start: 2023-01-01 | End: 2023-01-01

## 2023-01-01 RX ORDER — BENZOCAINE 10 %
1 GEL (GRAM) MUCOUS MEMBRANE ONCE
Refills: 0 | Status: DISCONTINUED | OUTPATIENT
Start: 2023-01-01 | End: 2023-01-01

## 2023-01-01 RX ORDER — SODIUM CHLORIDE 9 MG/ML
1000 INJECTION INTRAMUSCULAR; INTRAVENOUS; SUBCUTANEOUS ONCE
Refills: 0 | Status: COMPLETED | OUTPATIENT
Start: 2023-01-01 | End: 2023-01-01

## 2023-01-01 RX ORDER — ROBINUL 0.2 MG/ML
0.4 INJECTION INTRAMUSCULAR; INTRAVENOUS ONCE
Refills: 0 | Status: COMPLETED | OUTPATIENT
Start: 2023-01-01 | End: 2023-01-01

## 2023-01-01 RX ORDER — DEXMEDETOMIDINE HYDROCHLORIDE IN 0.9% SODIUM CHLORIDE 4 UG/ML
0.05 INJECTION INTRAVENOUS
Qty: 400 | Refills: 0 | Status: DISCONTINUED | OUTPATIENT
Start: 2023-01-01 | End: 2023-01-01

## 2023-01-01 RX ORDER — POTASSIUM PHOSPHATE, MONOBASIC POTASSIUM PHOSPHATE, DIBASIC 236; 224 MG/ML; MG/ML
15 INJECTION, SOLUTION INTRAVENOUS ONCE
Refills: 0 | Status: DISCONTINUED | OUTPATIENT
Start: 2023-01-01 | End: 2023-01-01

## 2023-01-01 RX ORDER — OXYCODONE HYDROCHLORIDE 5 MG/1
2.5 TABLET ORAL EVERY 4 HOURS
Refills: 0 | Status: DISCONTINUED | OUTPATIENT
Start: 2023-01-01 | End: 2023-01-01

## 2023-01-01 RX ORDER — ONDANSETRON 8 MG/1
4 TABLET, FILM COATED ORAL EVERY 8 HOURS
Refills: 0 | Status: DISCONTINUED | OUTPATIENT
Start: 2023-01-01 | End: 2023-01-01

## 2023-01-01 RX ORDER — ACETAMINOPHEN 500 MG
1000 TABLET ORAL EVERY 8 HOURS
Refills: 0 | Status: DISCONTINUED | OUTPATIENT
Start: 2023-01-01 | End: 2023-01-01

## 2023-01-01 RX ORDER — POLYETHYLENE GLYCOL 3350 17 G/17G
17 POWDER, FOR SOLUTION ORAL DAILY
Refills: 0 | Status: DISCONTINUED | OUTPATIENT
Start: 2023-01-01 | End: 2023-01-01

## 2023-01-01 RX ORDER — CHLORHEXIDINE GLUCONATE 213 G/1000ML
1 SOLUTION TOPICAL
Refills: 0 | Status: DISCONTINUED | OUTPATIENT
Start: 2023-01-01 | End: 2023-01-01

## 2023-01-01 RX ORDER — CHLORHEXIDINE GLUCONATE 213 G/1000ML
15 SOLUTION TOPICAL EVERY 12 HOURS
Refills: 0 | Status: DISCONTINUED | OUTPATIENT
Start: 2023-01-01 | End: 2023-01-01

## 2023-01-01 RX ORDER — CEFIDEROCOL SULFATE TOSYLATE 1 G/10ML
1500 INJECTION, POWDER, FOR SOLUTION INTRAVENOUS EVERY 8 HOURS
Refills: 0 | Status: DISCONTINUED | OUTPATIENT
Start: 2023-01-01 | End: 2023-01-01

## 2023-01-01 RX ORDER — POTASSIUM CHLORIDE 20 MEQ
20 PACKET (EA) ORAL ONCE
Refills: 0 | Status: DISCONTINUED | OUTPATIENT
Start: 2023-01-01 | End: 2023-01-01

## 2023-01-01 RX ORDER — NALOXONE HYDROCHLORIDE 4 MG/.1ML
0.4 SPRAY NASAL ONCE
Refills: 0 | Status: DISCONTINUED | OUTPATIENT
Start: 2023-01-01 | End: 2023-01-01

## 2023-01-01 RX ORDER — LABETALOL HCL 100 MG
5 TABLET ORAL ONCE
Refills: 0 | Status: COMPLETED | OUTPATIENT
Start: 2023-01-01 | End: 2023-01-01

## 2023-01-01 RX ORDER — ACETAMINOPHEN 500 MG
1000 TABLET ORAL ONCE
Refills: 0 | Status: COMPLETED | OUTPATIENT
Start: 2023-01-01 | End: 2023-01-01

## 2023-01-01 RX ORDER — LABETALOL HCL 100 MG
10 TABLET ORAL ONCE
Refills: 0 | Status: COMPLETED | OUTPATIENT
Start: 2023-01-01 | End: 2023-01-01

## 2023-01-01 RX ORDER — LABETALOL HCL 100 MG
10 TABLET ORAL EVERY 6 HOURS
Refills: 0 | Status: DISCONTINUED | OUTPATIENT
Start: 2023-01-01 | End: 2023-01-01

## 2023-01-01 RX ORDER — SCOPALAMINE 1 MG/3D
1 PATCH, EXTENDED RELEASE TRANSDERMAL ONCE
Refills: 0 | Status: COMPLETED | OUTPATIENT
Start: 2023-01-01 | End: 2023-01-01

## 2023-01-01 RX ORDER — HYDROMORPHONE HYDROCHLORIDE 2 MG/ML
0.5 INJECTION INTRAMUSCULAR; INTRAVENOUS; SUBCUTANEOUS
Refills: 0 | Status: DISCONTINUED | OUTPATIENT
Start: 2023-01-01 | End: 2023-01-01

## 2023-01-01 RX ORDER — PHENYLEPHRINE HYDROCHLORIDE 10 MG/ML
0.1 INJECTION INTRAVENOUS
Qty: 40 | Refills: 0 | Status: DISCONTINUED | OUTPATIENT
Start: 2023-01-01 | End: 2023-01-01

## 2023-01-01 RX ORDER — HYDROMORPHONE HYDROCHLORIDE 2 MG/ML
0.5 INJECTION INTRAMUSCULAR; INTRAVENOUS; SUBCUTANEOUS ONCE
Refills: 0 | Status: DISCONTINUED | OUTPATIENT
Start: 2023-01-01 | End: 2023-01-01

## 2023-01-01 RX ORDER — ASPIRIN/CALCIUM CARB/MAGNESIUM 324 MG
81 TABLET ORAL DAILY
Refills: 0 | Status: DISCONTINUED | OUTPATIENT
Start: 2023-01-01 | End: 2023-01-01

## 2023-01-01 RX ORDER — MORPHINE SULFATE 50 MG/1
4 CAPSULE, EXTENDED RELEASE ORAL ONCE
Refills: 0 | Status: DISCONTINUED | OUTPATIENT
Start: 2023-01-01 | End: 2023-01-01

## 2023-01-01 RX ORDER — CEFIDEROCOL SULFATE TOSYLATE 1 G/10ML
1500 INJECTION, POWDER, FOR SOLUTION INTRAVENOUS EVERY 8 HOURS
Refills: 0 | Status: COMPLETED | OUTPATIENT
Start: 2023-01-01 | End: 2023-01-01

## 2023-01-01 RX ORDER — CEFIDEROCOL SULFATE TOSYLATE 1 G/10ML
2000 INJECTION, POWDER, FOR SOLUTION INTRAVENOUS EVERY 8 HOURS
Refills: 0 | Status: DISCONTINUED | OUTPATIENT
Start: 2023-01-01 | End: 2023-01-01

## 2023-01-01 RX ORDER — GABAPENTIN 400 MG/1
100 CAPSULE ORAL EVERY 8 HOURS
Refills: 0 | Status: DISCONTINUED | OUTPATIENT
Start: 2023-01-01 | End: 2023-01-01

## 2023-01-01 RX ORDER — PIPERACILLIN AND TAZOBACTAM 4; .5 G/20ML; G/20ML
3.38 INJECTION, POWDER, LYOPHILIZED, FOR SOLUTION INTRAVENOUS ONCE
Refills: 0 | Status: COMPLETED | OUTPATIENT
Start: 2023-01-01 | End: 2023-01-01

## 2023-01-01 RX ORDER — ROBINUL 0.2 MG/ML
0.2 INJECTION INTRAMUSCULAR; INTRAVENOUS EVERY 6 HOURS
Refills: 0 | Status: DISCONTINUED | OUTPATIENT
Start: 2023-01-01 | End: 2023-01-01

## 2023-01-01 RX ADMIN — HEPARIN SODIUM 5000 UNIT(S): 5000 INJECTION INTRAVENOUS; SUBCUTANEOUS at 21:30

## 2023-01-01 RX ADMIN — FENTANYL CITRATE 25 MICROGRAM(S): 50 INJECTION INTRAVENOUS at 10:00

## 2023-01-01 RX ADMIN — HEPARIN SODIUM 5000 UNIT(S): 5000 INJECTION INTRAVENOUS; SUBCUTANEOUS at 05:27

## 2023-01-01 RX ADMIN — DEXMEDETOMIDINE HYDROCHLORIDE IN 0.9% SODIUM CHLORIDE 1.03 MICROGRAM(S)/KG/HR: 4 INJECTION INTRAVENOUS at 02:06

## 2023-01-01 RX ADMIN — PIPERACILLIN AND TAZOBACTAM 25 GRAM(S): 4; .5 INJECTION, POWDER, LYOPHILIZED, FOR SOLUTION INTRAVENOUS at 14:21

## 2023-01-01 RX ADMIN — PHENYLEPHRINE HYDROCHLORIDE 3.08 MICROGRAM(S)/KG/MIN: 10 INJECTION INTRAVENOUS at 08:35

## 2023-01-01 RX ADMIN — HEPARIN SODIUM 5000 UNIT(S): 5000 INJECTION INTRAVENOUS; SUBCUTANEOUS at 06:08

## 2023-01-01 RX ADMIN — Medication 81 MILLIGRAM(S): at 12:22

## 2023-01-01 RX ADMIN — CEFIDEROCOL SULFATE TOSYLATE 33.33 MILLIGRAM(S): 1 INJECTION, POWDER, FOR SOLUTION INTRAVENOUS at 06:06

## 2023-01-01 RX ADMIN — GABAPENTIN 100 MILLIGRAM(S): 400 CAPSULE ORAL at 22:20

## 2023-01-01 RX ADMIN — Medication 1000 MILLIGRAM(S): at 12:19

## 2023-01-01 RX ADMIN — HEPARIN SODIUM 5000 UNIT(S): 5000 INJECTION INTRAVENOUS; SUBCUTANEOUS at 05:42

## 2023-01-01 RX ADMIN — CHLORHEXIDINE GLUCONATE 15 MILLILITER(S): 213 SOLUTION TOPICAL at 06:34

## 2023-01-01 RX ADMIN — Medication 81 MILLIGRAM(S): at 13:09

## 2023-01-01 RX ADMIN — HEPARIN SODIUM 5000 UNIT(S): 5000 INJECTION INTRAVENOUS; SUBCUTANEOUS at 21:25

## 2023-01-01 RX ADMIN — CHLORHEXIDINE GLUCONATE 15 MILLILITER(S): 213 SOLUTION TOPICAL at 06:06

## 2023-01-01 RX ADMIN — PANTOPRAZOLE SODIUM 40 MILLIGRAM(S): 20 TABLET, DELAYED RELEASE ORAL at 06:04

## 2023-01-01 RX ADMIN — Medication 20 MILLIGRAM(S): at 20:35

## 2023-01-01 RX ADMIN — Medication 300 MILLIGRAM(S): at 11:03

## 2023-01-01 RX ADMIN — GABAPENTIN 100 MILLIGRAM(S): 400 CAPSULE ORAL at 21:21

## 2023-01-01 RX ADMIN — Medication 1000 MILLIGRAM(S): at 01:20

## 2023-01-01 RX ADMIN — GABAPENTIN 100 MILLIGRAM(S): 400 CAPSULE ORAL at 22:33

## 2023-01-01 RX ADMIN — GABAPENTIN 100 MILLIGRAM(S): 400 CAPSULE ORAL at 13:33

## 2023-01-01 RX ADMIN — CHLORHEXIDINE GLUCONATE 15 MILLILITER(S): 213 SOLUTION TOPICAL at 19:12

## 2023-01-01 RX ADMIN — FENTANYL CITRATE 25 MICROGRAM(S): 50 INJECTION INTRAVENOUS at 02:52

## 2023-01-01 RX ADMIN — Medication 81 MILLIGRAM(S): at 12:13

## 2023-01-01 RX ADMIN — Medication 81 MILLIGRAM(S): at 11:47

## 2023-01-01 RX ADMIN — Medication 25 GRAM(S): at 13:09

## 2023-01-01 RX ADMIN — CEFIDEROCOL SULFATE TOSYLATE 33.33 MILLIGRAM(S): 1 INJECTION, POWDER, FOR SOLUTION INTRAVENOUS at 13:22

## 2023-01-01 RX ADMIN — CEFIDEROCOL SULFATE TOSYLATE 33.33 MILLIGRAM(S): 1 INJECTION, POWDER, FOR SOLUTION INTRAVENOUS at 15:33

## 2023-01-01 RX ADMIN — SODIUM CHLORIDE 125 MILLILITER(S): 9 INJECTION, SOLUTION INTRAVENOUS at 08:09

## 2023-01-01 RX ADMIN — HEPARIN SODIUM 5000 UNIT(S): 5000 INJECTION INTRAVENOUS; SUBCUTANEOUS at 05:18

## 2023-01-01 RX ADMIN — HYDROMORPHONE HYDROCHLORIDE 0.5 MILLIGRAM(S): 2 INJECTION INTRAMUSCULAR; INTRAVENOUS; SUBCUTANEOUS at 13:07

## 2023-01-01 RX ADMIN — CEFIDEROCOL SULFATE TOSYLATE 33.33 MILLIGRAM(S): 1 INJECTION, POWDER, FOR SOLUTION INTRAVENOUS at 21:33

## 2023-01-01 RX ADMIN — HEPARIN SODIUM 5000 UNIT(S): 5000 INJECTION INTRAVENOUS; SUBCUTANEOUS at 13:12

## 2023-01-01 RX ADMIN — GABAPENTIN 100 MILLIGRAM(S): 400 CAPSULE ORAL at 13:21

## 2023-01-01 RX ADMIN — SODIUM CHLORIDE 1000 MILLILITER(S): 9 INJECTION INTRAMUSCULAR; INTRAVENOUS; SUBCUTANEOUS at 22:28

## 2023-01-01 RX ADMIN — PANTOPRAZOLE SODIUM 40 MILLIGRAM(S): 20 TABLET, DELAYED RELEASE ORAL at 06:44

## 2023-01-01 RX ADMIN — HEPARIN SODIUM 5000 UNIT(S): 5000 INJECTION INTRAVENOUS; SUBCUTANEOUS at 22:09

## 2023-01-01 RX ADMIN — FENTANYL CITRATE 25 MICROGRAM(S): 50 INJECTION INTRAVENOUS at 18:28

## 2023-01-01 RX ADMIN — LISINOPRIL 20 MILLIGRAM(S): 2.5 TABLET ORAL at 05:36

## 2023-01-01 RX ADMIN — CHLORHEXIDINE GLUCONATE 1 APPLICATION(S): 213 SOLUTION TOPICAL at 05:09

## 2023-01-01 RX ADMIN — SODIUM CHLORIDE 125 MILLILITER(S): 9 INJECTION, SOLUTION INTRAVENOUS at 13:55

## 2023-01-01 RX ADMIN — CHLORHEXIDINE GLUCONATE 15 MILLILITER(S): 213 SOLUTION TOPICAL at 16:53

## 2023-01-01 RX ADMIN — Medication 1000 MILLIGRAM(S): at 06:15

## 2023-01-01 RX ADMIN — CEFIDEROCOL SULFATE TOSYLATE 33.33 MILLIGRAM(S): 1 INJECTION, POWDER, FOR SOLUTION INTRAVENOUS at 13:33

## 2023-01-01 RX ADMIN — SODIUM CHLORIDE 125 MILLILITER(S): 9 INJECTION, SOLUTION INTRAVENOUS at 12:10

## 2023-01-01 RX ADMIN — PANTOPRAZOLE SODIUM 40 MILLIGRAM(S): 20 TABLET, DELAYED RELEASE ORAL at 06:05

## 2023-01-01 RX ADMIN — CHLORHEXIDINE GLUCONATE 15 MILLILITER(S): 213 SOLUTION TOPICAL at 06:02

## 2023-01-01 RX ADMIN — Medication 25 GRAM(S): at 18:44

## 2023-01-01 RX ADMIN — DEXMEDETOMIDINE HYDROCHLORIDE IN 0.9% SODIUM CHLORIDE 1.03 MICROGRAM(S)/KG/HR: 4 INJECTION INTRAVENOUS at 07:14

## 2023-01-01 RX ADMIN — ONDANSETRON 4 MILLIGRAM(S): 8 TABLET, FILM COATED ORAL at 22:28

## 2023-01-01 RX ADMIN — CEFIDEROCOL SULFATE TOSYLATE 33.33 MILLIGRAM(S): 1 INJECTION, POWDER, FOR SOLUTION INTRAVENOUS at 21:22

## 2023-01-01 RX ADMIN — CHLORHEXIDINE GLUCONATE 1 APPLICATION(S): 213 SOLUTION TOPICAL at 06:07

## 2023-01-01 RX ADMIN — CEFIDEROCOL SULFATE TOSYLATE 33.33 MILLIGRAM(S): 1 INJECTION, POWDER, FOR SOLUTION INTRAVENOUS at 13:12

## 2023-01-01 RX ADMIN — MORPHINE SULFATE 4 MILLIGRAM(S): 50 CAPSULE, EXTENDED RELEASE ORAL at 22:28

## 2023-01-01 RX ADMIN — Medication 1000 MILLIGRAM(S): at 06:14

## 2023-01-01 RX ADMIN — SCOPALAMINE 1 PATCH: 1 PATCH, EXTENDED RELEASE TRANSDERMAL at 22:53

## 2023-01-01 RX ADMIN — FENTANYL CITRATE 25 MICROGRAM(S): 50 INJECTION INTRAVENOUS at 10:49

## 2023-01-01 RX ADMIN — ROBINUL 0.2 MILLIGRAM(S): 0.2 INJECTION INTRAMUSCULAR; INTRAVENOUS at 12:28

## 2023-01-01 RX ADMIN — PIPERACILLIN AND TAZOBACTAM 25 GRAM(S): 4; .5 INJECTION, POWDER, LYOPHILIZED, FOR SOLUTION INTRAVENOUS at 06:43

## 2023-01-01 RX ADMIN — CHLORHEXIDINE GLUCONATE 1 APPLICATION(S): 213 SOLUTION TOPICAL at 05:19

## 2023-01-01 RX ADMIN — HYDROMORPHONE HYDROCHLORIDE 0.5 MILLIGRAM(S): 2 INJECTION INTRAMUSCULAR; INTRAVENOUS; SUBCUTANEOUS at 11:19

## 2023-01-01 RX ADMIN — PIPERACILLIN AND TAZOBACTAM 200 GRAM(S): 4; .5 INJECTION, POWDER, LYOPHILIZED, FOR SOLUTION INTRAVENOUS at 04:49

## 2023-01-01 RX ADMIN — HEPARIN SODIUM 5000 UNIT(S): 5000 INJECTION INTRAVENOUS; SUBCUTANEOUS at 13:52

## 2023-01-01 RX ADMIN — Medication 63.75 MILLIMOLE(S): at 04:10

## 2023-01-01 RX ADMIN — Medication 25 GRAM(S): at 20:12

## 2023-01-01 RX ADMIN — HEPARIN SODIUM 5000 UNIT(S): 5000 INJECTION INTRAVENOUS; SUBCUTANEOUS at 01:20

## 2023-01-01 RX ADMIN — FENTANYL CITRATE 25 MICROGRAM(S): 50 INJECTION INTRAVENOUS at 06:08

## 2023-01-01 RX ADMIN — PIPERACILLIN AND TAZOBACTAM 25 GRAM(S): 4; .5 INJECTION, POWDER, LYOPHILIZED, FOR SOLUTION INTRAVENOUS at 21:22

## 2023-01-01 RX ADMIN — I.V. FAT EMULSION 31.3 GM/KG/DAY: 20 EMULSION INTRAVENOUS at 11:18

## 2023-01-01 RX ADMIN — HEPARIN SODIUM 5000 UNIT(S): 5000 INJECTION INTRAVENOUS; SUBCUTANEOUS at 21:58

## 2023-01-01 RX ADMIN — Medication 40 MILLIGRAM(S): at 10:46

## 2023-01-01 RX ADMIN — Medication 50 MILLIEQUIVALENT(S): at 21:13

## 2023-01-01 RX ADMIN — Medication 1000 MILLIGRAM(S): at 13:24

## 2023-01-01 RX ADMIN — AMLODIPINE BESYLATE 5 MILLIGRAM(S): 2.5 TABLET ORAL at 06:05

## 2023-01-01 RX ADMIN — Medication 1 MILLIGRAM(S): at 18:54

## 2023-01-01 RX ADMIN — POTASSIUM PHOSPHATE, MONOBASIC POTASSIUM PHOSPHATE, DIBASIC 83.33 MILLIMOLE(S): 236; 224 INJECTION, SOLUTION INTRAVENOUS at 08:00

## 2023-01-01 RX ADMIN — FENTANYL CITRATE 25 MICROGRAM(S): 50 INJECTION INTRAVENOUS at 21:37

## 2023-01-01 RX ADMIN — Medication 300 MILLIGRAM(S): at 10:30

## 2023-01-01 RX ADMIN — HYDROMORPHONE HYDROCHLORIDE 0.5 MILLIGRAM(S): 2 INJECTION INTRAMUSCULAR; INTRAVENOUS; SUBCUTANEOUS at 14:07

## 2023-01-01 RX ADMIN — DEXMEDETOMIDINE HYDROCHLORIDE IN 0.9% SODIUM CHLORIDE 1.03 MICROGRAM(S)/KG/HR: 4 INJECTION INTRAVENOUS at 00:49

## 2023-01-01 RX ADMIN — SODIUM CHLORIDE 50 MILLILITER(S): 9 INJECTION INTRAMUSCULAR; INTRAVENOUS; SUBCUTANEOUS at 15:56

## 2023-01-01 RX ADMIN — Medication 25 GRAM(S): at 02:09

## 2023-01-01 RX ADMIN — CHLORHEXIDINE GLUCONATE 15 MILLILITER(S): 213 SOLUTION TOPICAL at 05:24

## 2023-01-01 RX ADMIN — HYDROMORPHONE HYDROCHLORIDE 1 MILLIGRAM(S): 2 INJECTION INTRAMUSCULAR; INTRAVENOUS; SUBCUTANEOUS at 11:55

## 2023-01-01 RX ADMIN — FENTANYL CITRATE 25 MICROGRAM(S): 50 INJECTION INTRAVENOUS at 00:00

## 2023-01-01 RX ADMIN — CHLORHEXIDINE GLUCONATE 1 APPLICATION(S): 213 SOLUTION TOPICAL at 05:46

## 2023-01-01 RX ADMIN — DEXMEDETOMIDINE HYDROCHLORIDE IN 0.9% SODIUM CHLORIDE 1.03 MICROGRAM(S)/KG/HR: 4 INJECTION INTRAVENOUS at 12:11

## 2023-01-01 RX ADMIN — CHLORHEXIDINE GLUCONATE 15 MILLILITER(S): 213 SOLUTION TOPICAL at 05:33

## 2023-01-01 RX ADMIN — SODIUM CHLORIDE 125 MILLILITER(S): 9 INJECTION, SOLUTION INTRAVENOUS at 08:36

## 2023-01-01 RX ADMIN — FENTANYL CITRATE 25 MICROGRAM(S): 50 INJECTION INTRAVENOUS at 14:16

## 2023-01-01 RX ADMIN — Medication 81 MILLIGRAM(S): at 12:03

## 2023-01-01 RX ADMIN — SODIUM CHLORIDE 500 MILLILITER(S): 9 INJECTION, SOLUTION INTRAVENOUS at 15:32

## 2023-01-01 RX ADMIN — Medication 50 MILLIEQUIVALENT(S): at 18:44

## 2023-01-01 RX ADMIN — SIMVASTATIN 20 MILLIGRAM(S): 20 TABLET, FILM COATED ORAL at 21:31

## 2023-01-01 RX ADMIN — Medication 50 MILLIEQUIVALENT(S): at 06:22

## 2023-01-01 RX ADMIN — HEPARIN SODIUM 5000 UNIT(S): 5000 INJECTION INTRAVENOUS; SUBCUTANEOUS at 21:41

## 2023-01-01 RX ADMIN — SODIUM CHLORIDE 1000 MILLILITER(S): 9 INJECTION, SOLUTION INTRAVENOUS at 12:20

## 2023-01-01 RX ADMIN — AMLODIPINE BESYLATE 5 MILLIGRAM(S): 2.5 TABLET ORAL at 11:15

## 2023-01-01 RX ADMIN — CEFIDEROCOL SULFATE TOSYLATE 33.33 MILLIGRAM(S): 1 INJECTION, POWDER, FOR SOLUTION INTRAVENOUS at 13:16

## 2023-01-01 RX ADMIN — CHLORHEXIDINE GLUCONATE 15 MILLILITER(S): 213 SOLUTION TOPICAL at 18:40

## 2023-01-01 RX ADMIN — HYDROMORPHONE HYDROCHLORIDE 1 MILLIGRAM(S): 2 INJECTION INTRAMUSCULAR; INTRAVENOUS; SUBCUTANEOUS at 14:45

## 2023-01-01 RX ADMIN — Medication 10 MILLIGRAM(S): at 15:58

## 2023-01-01 RX ADMIN — POTASSIUM PHOSPHATE, MONOBASIC POTASSIUM PHOSPHATE, DIBASIC 83.33 MILLIMOLE(S): 236; 224 INJECTION, SOLUTION INTRAVENOUS at 01:32

## 2023-01-01 RX ADMIN — HEPARIN SODIUM 5000 UNIT(S): 5000 INJECTION INTRAVENOUS; SUBCUTANEOUS at 13:45

## 2023-01-01 RX ADMIN — POTASSIUM PHOSPHATE, MONOBASIC POTASSIUM PHOSPHATE, DIBASIC 62.5 MILLIMOLE(S): 236; 224 INJECTION, SOLUTION INTRAVENOUS at 13:30

## 2023-01-01 RX ADMIN — SODIUM CHLORIDE 500 MILLILITER(S): 9 INJECTION INTRAMUSCULAR; INTRAVENOUS; SUBCUTANEOUS at 20:52

## 2023-01-01 RX ADMIN — PIPERACILLIN AND TAZOBACTAM 25 GRAM(S): 4; .5 INJECTION, POWDER, LYOPHILIZED, FOR SOLUTION INTRAVENOUS at 23:17

## 2023-01-01 RX ADMIN — HEPARIN SODIUM 5000 UNIT(S): 5000 INJECTION INTRAVENOUS; SUBCUTANEOUS at 14:04

## 2023-01-01 RX ADMIN — CEFIDEROCOL SULFATE TOSYLATE 33.33 MILLIGRAM(S): 1 INJECTION, POWDER, FOR SOLUTION INTRAVENOUS at 05:14

## 2023-01-01 RX ADMIN — CEFIDEROCOL SULFATE TOSYLATE 33.33 MILLIGRAM(S): 1 INJECTION, POWDER, FOR SOLUTION INTRAVENOUS at 22:01

## 2023-01-01 RX ADMIN — GABAPENTIN 100 MILLIGRAM(S): 400 CAPSULE ORAL at 13:08

## 2023-01-01 RX ADMIN — PIPERACILLIN AND TAZOBACTAM 25 GRAM(S): 4; .5 INJECTION, POWDER, LYOPHILIZED, FOR SOLUTION INTRAVENOUS at 21:25

## 2023-01-01 RX ADMIN — Medication 1000 MILLIGRAM(S): at 03:02

## 2023-01-01 RX ADMIN — SODIUM CHLORIDE 1000 MILLILITER(S): 9 INJECTION INTRAMUSCULAR; INTRAVENOUS; SUBCUTANEOUS at 17:00

## 2023-01-01 RX ADMIN — ROBINUL 0.2 MILLIGRAM(S): 0.2 INJECTION INTRAMUSCULAR; INTRAVENOUS at 13:32

## 2023-01-01 RX ADMIN — HEPARIN SODIUM 5000 UNIT(S): 5000 INJECTION INTRAVENOUS; SUBCUTANEOUS at 06:02

## 2023-01-01 RX ADMIN — GABAPENTIN 100 MILLIGRAM(S): 400 CAPSULE ORAL at 13:25

## 2023-01-01 RX ADMIN — FENTANYL CITRATE 25 MICROGRAM(S): 50 INJECTION INTRAVENOUS at 14:31

## 2023-01-01 RX ADMIN — PANTOPRAZOLE SODIUM 40 MILLIGRAM(S): 20 TABLET, DELAYED RELEASE ORAL at 06:30

## 2023-01-01 RX ADMIN — AMLODIPINE BESYLATE 5 MILLIGRAM(S): 2.5 TABLET ORAL at 05:19

## 2023-01-01 RX ADMIN — HEPARIN SODIUM 5000 UNIT(S): 5000 INJECTION INTRAVENOUS; SUBCUTANEOUS at 05:38

## 2023-01-01 RX ADMIN — FENTANYL CITRATE 25 MICROGRAM(S): 50 INJECTION INTRAVENOUS at 10:41

## 2023-01-01 RX ADMIN — Medication 1 EACH: at 21:53

## 2023-01-01 RX ADMIN — HEPARIN SODIUM 5000 UNIT(S): 5000 INJECTION INTRAVENOUS; SUBCUTANEOUS at 14:15

## 2023-01-01 RX ADMIN — CEFIDEROCOL SULFATE TOSYLATE 33.33 MILLIGRAM(S): 1 INJECTION, POWDER, FOR SOLUTION INTRAVENOUS at 21:42

## 2023-01-01 RX ADMIN — Medication 50 GRAM(S): at 09:27

## 2023-01-01 RX ADMIN — MIDAZOLAM HYDROCHLORIDE 2 MILLIGRAM(S): 1 INJECTION, SOLUTION INTRAMUSCULAR; INTRAVENOUS at 08:38

## 2023-01-01 RX ADMIN — LISINOPRIL 20 MILLIGRAM(S): 2.5 TABLET ORAL at 05:50

## 2023-01-01 RX ADMIN — CEFIDEROCOL SULFATE TOSYLATE 33.33 MILLIGRAM(S): 1 INJECTION, POWDER, FOR SOLUTION INTRAVENOUS at 08:04

## 2023-01-01 RX ADMIN — Medication 1000 MILLIGRAM(S): at 13:21

## 2023-01-01 RX ADMIN — PIPERACILLIN AND TAZOBACTAM 25 GRAM(S): 4; .5 INJECTION, POWDER, LYOPHILIZED, FOR SOLUTION INTRAVENOUS at 08:50

## 2023-01-01 RX ADMIN — Medication 1.54 MICROGRAM(S)/KG/MIN: at 18:04

## 2023-01-01 RX ADMIN — Medication 1 MILLIGRAM(S): at 14:07

## 2023-01-01 RX ADMIN — CEFIDEROCOL SULFATE TOSYLATE 33.33 MILLIGRAM(S): 1 INJECTION, POWDER, FOR SOLUTION INTRAVENOUS at 22:34

## 2023-01-01 RX ADMIN — Medication 1 EACH: at 22:25

## 2023-01-01 RX ADMIN — FENTANYL CITRATE 25 MICROGRAM(S): 50 INJECTION INTRAVENOUS at 03:51

## 2023-01-01 RX ADMIN — CHLORHEXIDINE GLUCONATE 1 APPLICATION(S): 213 SOLUTION TOPICAL at 05:44

## 2023-01-01 RX ADMIN — Medication 1.54 MICROGRAM(S)/KG/MIN: at 21:45

## 2023-01-01 RX ADMIN — CEFIDEROCOL SULFATE TOSYLATE 33.33 MILLIGRAM(S): 1 INJECTION, POWDER, FOR SOLUTION INTRAVENOUS at 13:43

## 2023-01-01 RX ADMIN — HEPARIN SODIUM 5000 UNIT(S): 5000 INJECTION INTRAVENOUS; SUBCUTANEOUS at 14:17

## 2023-01-01 RX ADMIN — ATORVASTATIN CALCIUM 80 MILLIGRAM(S): 80 TABLET, FILM COATED ORAL at 22:34

## 2023-01-01 RX ADMIN — HEPARIN SODIUM 5000 UNIT(S): 5000 INJECTION INTRAVENOUS; SUBCUTANEOUS at 13:59

## 2023-01-01 RX ADMIN — CEFIDEROCOL SULFATE TOSYLATE 33.33 MILLIGRAM(S): 1 INJECTION, POWDER, FOR SOLUTION INTRAVENOUS at 05:28

## 2023-01-01 RX ADMIN — DEXMEDETOMIDINE HYDROCHLORIDE IN 0.9% SODIUM CHLORIDE 1.03 MICROGRAM(S)/KG/HR: 4 INJECTION INTRAVENOUS at 05:01

## 2023-01-01 RX ADMIN — ONDANSETRON 4 MILLIGRAM(S): 8 TABLET, FILM COATED ORAL at 04:07

## 2023-01-01 RX ADMIN — Medication 40 MILLIGRAM(S): at 11:09

## 2023-01-01 RX ADMIN — CEFIDEROCOL SULFATE TOSYLATE 33.33 MILLIGRAM(S): 1 INJECTION, POWDER, FOR SOLUTION INTRAVENOUS at 05:09

## 2023-01-01 RX ADMIN — Medication 50 MILLIEQUIVALENT(S): at 12:21

## 2023-01-01 RX ADMIN — HEPARIN SODIUM 5000 UNIT(S): 5000 INJECTION INTRAVENOUS; SUBCUTANEOUS at 23:17

## 2023-01-01 RX ADMIN — HEPARIN SODIUM 5000 UNIT(S): 5000 INJECTION INTRAVENOUS; SUBCUTANEOUS at 14:23

## 2023-01-01 RX ADMIN — PANTOPRAZOLE SODIUM 40 MILLIGRAM(S): 20 TABLET, DELAYED RELEASE ORAL at 05:51

## 2023-01-01 RX ADMIN — MIDAZOLAM HYDROCHLORIDE 2 MILLIGRAM(S): 1 INJECTION, SOLUTION INTRAMUSCULAR; INTRAVENOUS at 10:12

## 2023-01-01 RX ADMIN — SODIUM CHLORIDE 90 MILLILITER(S): 9 INJECTION, SOLUTION INTRAVENOUS at 06:21

## 2023-01-01 RX ADMIN — Medication 1000 MILLIGRAM(S): at 05:18

## 2023-01-01 RX ADMIN — HEPARIN SODIUM 5000 UNIT(S): 5000 INJECTION INTRAVENOUS; SUBCUTANEOUS at 22:37

## 2023-01-01 RX ADMIN — CHLORHEXIDINE GLUCONATE 15 MILLILITER(S): 213 SOLUTION TOPICAL at 18:00

## 2023-01-01 RX ADMIN — SODIUM CHLORIDE 3000 MILLILITER(S): 9 INJECTION INTRAMUSCULAR; INTRAVENOUS; SUBCUTANEOUS at 11:10

## 2023-01-01 RX ADMIN — SODIUM CHLORIDE 1000 MILLILITER(S): 9 INJECTION, SOLUTION INTRAVENOUS at 14:00

## 2023-01-01 RX ADMIN — SODIUM CHLORIDE 3000 MILLILITER(S): 9 INJECTION, SOLUTION INTRAVENOUS at 09:40

## 2023-01-01 RX ADMIN — Medication 1000 MILLIGRAM(S): at 23:08

## 2023-01-01 RX ADMIN — DEXMEDETOMIDINE HYDROCHLORIDE IN 0.9% SODIUM CHLORIDE 1.03 MICROGRAM(S)/KG/HR: 4 INJECTION INTRAVENOUS at 02:33

## 2023-01-01 RX ADMIN — PANTOPRAZOLE SODIUM 40 MILLIGRAM(S): 20 TABLET, DELAYED RELEASE ORAL at 06:10

## 2023-01-01 RX ADMIN — HEPARIN SODIUM 5000 UNIT(S): 5000 INJECTION INTRAVENOUS; SUBCUTANEOUS at 14:39

## 2023-01-01 RX ADMIN — Medication 1000 MILLIGRAM(S): at 15:01

## 2023-01-01 RX ADMIN — HEPARIN SODIUM 5000 UNIT(S): 5000 INJECTION INTRAVENOUS; SUBCUTANEOUS at 05:34

## 2023-01-01 RX ADMIN — Medication 60 MILLIGRAM(S): at 22:03

## 2023-01-01 RX ADMIN — Medication 400 MILLIGRAM(S): at 12:09

## 2023-01-01 RX ADMIN — CHLORHEXIDINE GLUCONATE 15 MILLILITER(S): 213 SOLUTION TOPICAL at 17:01

## 2023-01-01 RX ADMIN — CHLORHEXIDINE GLUCONATE 15 MILLILITER(S): 213 SOLUTION TOPICAL at 17:53

## 2023-01-01 RX ADMIN — CEFIDEROCOL SULFATE TOSYLATE 33.33 MILLIGRAM(S): 1 INJECTION, POWDER, FOR SOLUTION INTRAVENOUS at 05:49

## 2023-01-01 RX ADMIN — Medication 125 MILLILITER(S): at 21:46

## 2023-01-01 RX ADMIN — CHLORHEXIDINE GLUCONATE 15 MILLILITER(S): 213 SOLUTION TOPICAL at 06:31

## 2023-01-01 RX ADMIN — SODIUM CHLORIDE 1000 MILLILITER(S): 9 INJECTION INTRAMUSCULAR; INTRAVENOUS; SUBCUTANEOUS at 08:30

## 2023-01-01 RX ADMIN — SODIUM CHLORIDE 1000 MILLILITER(S): 9 INJECTION, SOLUTION INTRAVENOUS at 22:28

## 2023-01-01 RX ADMIN — DEXMEDETOMIDINE HYDROCHLORIDE IN 0.9% SODIUM CHLORIDE 1.03 MICROGRAM(S)/KG/HR: 4 INJECTION INTRAVENOUS at 10:06

## 2023-01-01 RX ADMIN — FENTANYL CITRATE 25 MICROGRAM(S): 50 INJECTION INTRAVENOUS at 06:33

## 2023-01-01 RX ADMIN — Medication 1 SPRAY(S): at 17:00

## 2023-01-01 RX ADMIN — SODIUM CHLORIDE 1000 MILLILITER(S): 9 INJECTION, SOLUTION INTRAVENOUS at 08:35

## 2023-01-01 RX ADMIN — POTASSIUM PHOSPHATE, MONOBASIC POTASSIUM PHOSPHATE, DIBASIC 83.33 MILLIMOLE(S): 236; 224 INJECTION, SOLUTION INTRAVENOUS at 02:36

## 2023-01-01 RX ADMIN — Medication 1000 MILLIGRAM(S): at 23:34

## 2023-01-01 RX ADMIN — CEFIDEROCOL SULFATE TOSYLATE 33.33 MILLIGRAM(S): 1 INJECTION, POWDER, FOR SOLUTION INTRAVENOUS at 22:03

## 2023-01-01 RX ADMIN — SODIUM CHLORIDE 1000 MILLILITER(S): 9 INJECTION INTRAMUSCULAR; INTRAVENOUS; SUBCUTANEOUS at 18:20

## 2023-01-01 RX ADMIN — CHLORHEXIDINE GLUCONATE 15 MILLILITER(S): 213 SOLUTION TOPICAL at 17:14

## 2023-01-01 RX ADMIN — Medication 1000 MILLIGRAM(S): at 21:37

## 2023-01-01 RX ADMIN — PANTOPRAZOLE SODIUM 40 MILLIGRAM(S): 20 TABLET, DELAYED RELEASE ORAL at 06:35

## 2023-01-01 RX ADMIN — POTASSIUM PHOSPHATE, MONOBASIC POTASSIUM PHOSPHATE, DIBASIC 62.5 MILLIMOLE(S): 236; 224 INJECTION, SOLUTION INTRAVENOUS at 09:50

## 2023-01-01 RX ADMIN — CHLORHEXIDINE GLUCONATE 15 MILLILITER(S): 213 SOLUTION TOPICAL at 05:18

## 2023-01-01 RX ADMIN — HEPARIN SODIUM 5000 UNIT(S): 5000 INJECTION INTRAVENOUS; SUBCUTANEOUS at 05:51

## 2023-01-01 RX ADMIN — CEFIDEROCOL SULFATE TOSYLATE 33.33 MILLIGRAM(S): 1 INJECTION, POWDER, FOR SOLUTION INTRAVENOUS at 05:42

## 2023-01-01 RX ADMIN — Medication 1000 MILLIGRAM(S): at 09:34

## 2023-01-01 RX ADMIN — FENTANYL CITRATE 25 MICROGRAM(S): 50 INJECTION INTRAVENOUS at 05:00

## 2023-01-01 RX ADMIN — HYDROMORPHONE HYDROCHLORIDE 1 MILLIGRAM(S): 2 INJECTION INTRAMUSCULAR; INTRAVENOUS; SUBCUTANEOUS at 10:07

## 2023-01-01 RX ADMIN — DEXMEDETOMIDINE HYDROCHLORIDE IN 0.9% SODIUM CHLORIDE 1.03 MICROGRAM(S)/KG/HR: 4 INJECTION INTRAVENOUS at 08:09

## 2023-01-01 RX ADMIN — FENTANYL CITRATE 25 MICROGRAM(S): 50 INJECTION INTRAVENOUS at 23:13

## 2023-01-01 RX ADMIN — PIPERACILLIN AND TAZOBACTAM 25 GRAM(S): 4; .5 INJECTION, POWDER, LYOPHILIZED, FOR SOLUTION INTRAVENOUS at 21:34

## 2023-01-01 RX ADMIN — HEPARIN SODIUM 5000 UNIT(S): 5000 INJECTION INTRAVENOUS; SUBCUTANEOUS at 14:24

## 2023-01-01 RX ADMIN — CHLORHEXIDINE GLUCONATE 1 APPLICATION(S): 213 SOLUTION TOPICAL at 05:48

## 2023-01-01 RX ADMIN — PIPERACILLIN AND TAZOBACTAM 25 GRAM(S): 4; .5 INJECTION, POWDER, LYOPHILIZED, FOR SOLUTION INTRAVENOUS at 21:58

## 2023-01-01 RX ADMIN — SODIUM CHLORIDE 500 MILLILITER(S): 9 INJECTION INTRAMUSCULAR; INTRAVENOUS; SUBCUTANEOUS at 17:51

## 2023-01-01 RX ADMIN — Medication 1000 MILLIGRAM(S): at 14:22

## 2023-01-01 RX ADMIN — Medication 50 MILLIEQUIVALENT(S): at 00:00

## 2023-01-01 RX ADMIN — HEPARIN SODIUM 5000 UNIT(S): 5000 INJECTION INTRAVENOUS; SUBCUTANEOUS at 21:31

## 2023-01-01 RX ADMIN — CEFIDEROCOL SULFATE TOSYLATE 33.33 MILLIGRAM(S): 1 INJECTION, POWDER, FOR SOLUTION INTRAVENOUS at 13:46

## 2023-01-01 RX ADMIN — SODIUM CHLORIDE 125 MILLILITER(S): 9 INJECTION, SOLUTION INTRAVENOUS at 02:05

## 2023-01-01 RX ADMIN — DEXMEDETOMIDINE HYDROCHLORIDE IN 0.9% SODIUM CHLORIDE 1.03 MICROGRAM(S)/KG/HR: 4 INJECTION INTRAVENOUS at 20:11

## 2023-01-01 RX ADMIN — HYDROMORPHONE HYDROCHLORIDE 0.5 MILLIGRAM(S): 2 INJECTION INTRAMUSCULAR; INTRAVENOUS; SUBCUTANEOUS at 18:54

## 2023-01-01 RX ADMIN — Medication 25 GRAM(S): at 16:22

## 2023-01-01 RX ADMIN — HEPARIN SODIUM 5000 UNIT(S): 5000 INJECTION INTRAVENOUS; SUBCUTANEOUS at 13:22

## 2023-01-01 RX ADMIN — PIPERACILLIN AND TAZOBACTAM 25 GRAM(S): 4; .5 INJECTION, POWDER, LYOPHILIZED, FOR SOLUTION INTRAVENOUS at 05:38

## 2023-01-01 RX ADMIN — PIPERACILLIN AND TAZOBACTAM 25 GRAM(S): 4; .5 INJECTION, POWDER, LYOPHILIZED, FOR SOLUTION INTRAVENOUS at 21:40

## 2023-01-01 RX ADMIN — DEXMEDETOMIDINE HYDROCHLORIDE IN 0.9% SODIUM CHLORIDE 1.03 MICROGRAM(S)/KG/HR: 4 INJECTION INTRAVENOUS at 05:58

## 2023-01-01 RX ADMIN — GABAPENTIN 100 MILLIGRAM(S): 400 CAPSULE ORAL at 23:08

## 2023-01-01 RX ADMIN — AMLODIPINE BESYLATE 5 MILLIGRAM(S): 2.5 TABLET ORAL at 11:09

## 2023-01-01 RX ADMIN — ONDANSETRON 4 MILLIGRAM(S): 8 TABLET, FILM COATED ORAL at 13:54

## 2023-01-01 RX ADMIN — Medication 200 GRAM(S): at 09:12

## 2023-01-01 RX ADMIN — HEPARIN SODIUM 5000 UNIT(S): 5000 INJECTION INTRAVENOUS; SUBCUTANEOUS at 13:25

## 2023-01-01 RX ADMIN — PANTOPRAZOLE SODIUM 40 MILLIGRAM(S): 20 TABLET, DELAYED RELEASE ORAL at 08:18

## 2023-01-01 RX ADMIN — GABAPENTIN 100 MILLIGRAM(S): 400 CAPSULE ORAL at 06:12

## 2023-01-01 RX ADMIN — DEXMEDETOMIDINE HYDROCHLORIDE IN 0.9% SODIUM CHLORIDE 1.03 MICROGRAM(S)/KG/HR: 4 INJECTION INTRAVENOUS at 04:48

## 2023-01-01 RX ADMIN — GABAPENTIN 100 MILLIGRAM(S): 400 CAPSULE ORAL at 05:25

## 2023-01-01 RX ADMIN — DEXMEDETOMIDINE HYDROCHLORIDE IN 0.9% SODIUM CHLORIDE 1.03 MICROGRAM(S)/KG/HR: 4 INJECTION INTRAVENOUS at 23:02

## 2023-01-01 RX ADMIN — HEPARIN SODIUM 5000 UNIT(S): 5000 INJECTION INTRAVENOUS; SUBCUTANEOUS at 06:06

## 2023-01-01 RX ADMIN — MIDAZOLAM HYDROCHLORIDE 1 MILLIGRAM(S): 1 INJECTION, SOLUTION INTRAMUSCULAR; INTRAVENOUS at 02:27

## 2023-01-01 RX ADMIN — Medication 1 EACH: at 20:23

## 2023-01-01 RX ADMIN — ATORVASTATIN CALCIUM 80 MILLIGRAM(S): 80 TABLET, FILM COATED ORAL at 21:21

## 2023-01-01 RX ADMIN — CHLORHEXIDINE GLUCONATE 15 MILLILITER(S): 213 SOLUTION TOPICAL at 05:02

## 2023-01-01 RX ADMIN — AMLODIPINE BESYLATE 5 MILLIGRAM(S): 2.5 TABLET ORAL at 05:43

## 2023-01-01 RX ADMIN — FENTANYL CITRATE 25 MICROGRAM(S): 50 INJECTION INTRAVENOUS at 04:33

## 2023-01-01 RX ADMIN — CHLORHEXIDINE GLUCONATE 15 MILLILITER(S): 213 SOLUTION TOPICAL at 05:42

## 2023-01-01 RX ADMIN — AMLODIPINE BESYLATE 5 MILLIGRAM(S): 2.5 TABLET ORAL at 05:56

## 2023-01-01 RX ADMIN — FENTANYL CITRATE 25 MICROGRAM(S): 50 INJECTION INTRAVENOUS at 23:41

## 2023-01-01 RX ADMIN — HEPARIN SODIUM 5000 UNIT(S): 5000 INJECTION INTRAVENOUS; SUBCUTANEOUS at 22:34

## 2023-01-01 RX ADMIN — Medication 1000 MILLIGRAM(S): at 13:09

## 2023-01-01 RX ADMIN — PIPERACILLIN AND TAZOBACTAM 25 GRAM(S): 4; .5 INJECTION, POWDER, LYOPHILIZED, FOR SOLUTION INTRAVENOUS at 06:45

## 2023-01-01 RX ADMIN — HEPARIN SODIUM 5000 UNIT(S): 5000 INJECTION INTRAVENOUS; SUBCUTANEOUS at 06:46

## 2023-01-01 RX ADMIN — Medication 200 GRAM(S): at 01:31

## 2023-01-01 RX ADMIN — Medication 10 MILLIGRAM(S): at 17:58

## 2023-01-01 RX ADMIN — HEPARIN SODIUM 5000 UNIT(S): 5000 INJECTION INTRAVENOUS; SUBCUTANEOUS at 06:45

## 2023-01-01 RX ADMIN — PIPERACILLIN AND TAZOBACTAM 25 GRAM(S): 4; .5 INJECTION, POWDER, LYOPHILIZED, FOR SOLUTION INTRAVENOUS at 13:54

## 2023-01-01 RX ADMIN — HEPARIN SODIUM 5000 UNIT(S): 5000 INJECTION INTRAVENOUS; SUBCUTANEOUS at 21:34

## 2023-01-01 RX ADMIN — DEXMEDETOMIDINE HYDROCHLORIDE IN 0.9% SODIUM CHLORIDE 1.03 MICROGRAM(S)/KG/HR: 4 INJECTION INTRAVENOUS at 05:45

## 2023-01-01 RX ADMIN — Medication 1000 MILLIGRAM(S): at 11:30

## 2023-01-01 RX ADMIN — Medication 1000 MILLIGRAM(S): at 06:11

## 2023-01-01 RX ADMIN — FENTANYL CITRATE 25 MICROGRAM(S): 50 INJECTION INTRAVENOUS at 06:45

## 2023-01-01 RX ADMIN — GABAPENTIN 100 MILLIGRAM(S): 400 CAPSULE ORAL at 13:10

## 2023-01-01 RX ADMIN — ATORVASTATIN CALCIUM 80 MILLIGRAM(S): 80 TABLET, FILM COATED ORAL at 22:19

## 2023-01-01 RX ADMIN — ATORVASTATIN CALCIUM 80 MILLIGRAM(S): 80 TABLET, FILM COATED ORAL at 23:08

## 2023-01-01 RX ADMIN — SODIUM CHLORIDE 1000 MILLILITER(S): 9 INJECTION, SOLUTION INTRAVENOUS at 08:00

## 2023-01-01 RX ADMIN — HEPARIN SODIUM 5000 UNIT(S): 5000 INJECTION INTRAVENOUS; SUBCUTANEOUS at 22:18

## 2023-01-01 RX ADMIN — Medication 1000 MILLIGRAM(S): at 13:12

## 2023-01-01 RX ADMIN — Medication 50 MILLIEQUIVALENT(S): at 02:05

## 2023-01-01 RX ADMIN — Medication 200 GRAM(S): at 05:41

## 2023-01-01 RX ADMIN — ATORVASTATIN CALCIUM 80 MILLIGRAM(S): 80 TABLET, FILM COATED ORAL at 01:21

## 2023-01-01 RX ADMIN — Medication 125 MILLILITER(S): at 16:15

## 2023-01-01 RX ADMIN — CHLORHEXIDINE GLUCONATE 15 MILLILITER(S): 213 SOLUTION TOPICAL at 05:55

## 2023-01-01 RX ADMIN — HEPARIN SODIUM 5000 UNIT(S): 5000 INJECTION INTRAVENOUS; SUBCUTANEOUS at 21:08

## 2023-01-01 RX ADMIN — Medication 1000 MILLIGRAM(S): at 21:20

## 2023-01-01 RX ADMIN — SODIUM CHLORIDE 500 MILLILITER(S): 9 INJECTION INTRAMUSCULAR; INTRAVENOUS; SUBCUTANEOUS at 14:20

## 2023-01-01 RX ADMIN — Medication 1 EACH: at 20:36

## 2023-01-01 RX ADMIN — FENTANYL CITRATE 25 MICROGRAM(S): 50 INJECTION INTRAVENOUS at 03:40

## 2023-01-01 RX ADMIN — CEFIDEROCOL SULFATE TOSYLATE 33.33 MILLIGRAM(S): 1 INJECTION, POWDER, FOR SOLUTION INTRAVENOUS at 05:22

## 2023-01-01 RX ADMIN — PANTOPRAZOLE SODIUM 40 MILLIGRAM(S): 20 TABLET, DELAYED RELEASE ORAL at 06:12

## 2023-01-01 RX ADMIN — CHLORHEXIDINE GLUCONATE 15 MILLILITER(S): 213 SOLUTION TOPICAL at 06:07

## 2023-01-01 RX ADMIN — CHLORHEXIDINE GLUCONATE 15 MILLILITER(S): 213 SOLUTION TOPICAL at 05:22

## 2023-01-01 RX ADMIN — CHLORHEXIDINE GLUCONATE 15 MILLILITER(S): 213 SOLUTION TOPICAL at 05:12

## 2023-01-01 RX ADMIN — Medication 1000 MILLIGRAM(S): at 14:28

## 2023-01-01 RX ADMIN — FENTANYL CITRATE 25 MICROGRAM(S): 50 INJECTION INTRAVENOUS at 09:26

## 2023-01-01 RX ADMIN — SODIUM CHLORIDE 90 MILLILITER(S): 9 INJECTION, SOLUTION INTRAVENOUS at 16:14

## 2023-01-01 RX ADMIN — SODIUM CHLORIDE 500 MILLILITER(S): 9 INJECTION INTRAMUSCULAR; INTRAVENOUS; SUBCUTANEOUS at 00:37

## 2023-01-01 RX ADMIN — FENTANYL CITRATE 25 MICROGRAM(S): 50 INJECTION INTRAVENOUS at 15:01

## 2023-01-01 RX ADMIN — Medication 65 EACH: at 20:30

## 2023-01-01 RX ADMIN — SODIUM CHLORIDE 50 MILLILITER(S): 9 INJECTION INTRAMUSCULAR; INTRAVENOUS; SUBCUTANEOUS at 20:52

## 2023-01-01 RX ADMIN — CHLORHEXIDINE GLUCONATE 15 MILLILITER(S): 213 SOLUTION TOPICAL at 17:18

## 2023-01-01 RX ADMIN — CEFIDEROCOL SULFATE TOSYLATE 33.33 MILLIGRAM(S): 1 INJECTION, POWDER, FOR SOLUTION INTRAVENOUS at 22:19

## 2023-01-01 RX ADMIN — CHLORHEXIDINE GLUCONATE 15 MILLILITER(S): 213 SOLUTION TOPICAL at 06:17

## 2023-01-01 RX ADMIN — Medication 50 MILLIEQUIVALENT(S): at 15:56

## 2023-01-01 RX ADMIN — SODIUM CHLORIDE 120 MILLILITER(S): 9 INJECTION, SOLUTION INTRAVENOUS at 04:49

## 2023-01-01 RX ADMIN — Medication 50 MILLIEQUIVALENT(S): at 02:50

## 2023-01-01 RX ADMIN — CEFIDEROCOL SULFATE TOSYLATE 33.33 MILLIGRAM(S): 1 INJECTION, POWDER, FOR SOLUTION INTRAVENOUS at 14:25

## 2023-01-01 RX ADMIN — SODIUM CHLORIDE 125 MILLILITER(S): 9 INJECTION, SOLUTION INTRAVENOUS at 05:02

## 2023-01-01 RX ADMIN — Medication 300 MILLIGRAM(S): at 12:40

## 2023-01-01 RX ADMIN — ATORVASTATIN CALCIUM 80 MILLIGRAM(S): 80 TABLET, FILM COATED ORAL at 21:38

## 2023-01-01 RX ADMIN — Medication 1 MILLIGRAM(S): at 12:12

## 2023-01-01 RX ADMIN — ONDANSETRON 4 MILLIGRAM(S): 8 TABLET, FILM COATED ORAL at 20:24

## 2023-01-01 RX ADMIN — HEPARIN SODIUM 5000 UNIT(S): 5000 INJECTION INTRAVENOUS; SUBCUTANEOUS at 23:54

## 2023-01-01 RX ADMIN — CHLORHEXIDINE GLUCONATE 1 APPLICATION(S): 213 SOLUTION TOPICAL at 06:34

## 2023-01-01 RX ADMIN — Medication 1000 MILLIGRAM(S): at 05:42

## 2023-01-01 RX ADMIN — CHLORHEXIDINE GLUCONATE 1 APPLICATION(S): 213 SOLUTION TOPICAL at 05:24

## 2023-01-01 RX ADMIN — FENTANYL CITRATE 25 MICROGRAM(S): 50 INJECTION INTRAVENOUS at 01:07

## 2023-01-01 RX ADMIN — PIPERACILLIN AND TAZOBACTAM 25 GRAM(S): 4; .5 INJECTION, POWDER, LYOPHILIZED, FOR SOLUTION INTRAVENOUS at 05:51

## 2023-01-01 RX ADMIN — FENTANYL CITRATE 25 MICROGRAM(S): 50 INJECTION INTRAVENOUS at 21:35

## 2023-01-01 RX ADMIN — SODIUM CHLORIDE 125 MILLILITER(S): 9 INJECTION, SOLUTION INTRAVENOUS at 09:00

## 2023-01-01 RX ADMIN — HEPARIN SODIUM 5000 UNIT(S): 5000 INJECTION INTRAVENOUS; SUBCUTANEOUS at 06:34

## 2023-01-01 RX ADMIN — Medication 81 MILLIGRAM(S): at 13:22

## 2023-01-01 RX ADMIN — SODIUM CHLORIDE 1000 MILLILITER(S): 9 INJECTION INTRAMUSCULAR; INTRAVENOUS; SUBCUTANEOUS at 11:00

## 2023-01-01 RX ADMIN — CHLORHEXIDINE GLUCONATE 15 MILLILITER(S): 213 SOLUTION TOPICAL at 05:09

## 2023-01-01 RX ADMIN — HEPARIN SODIUM 5000 UNIT(S): 5000 INJECTION INTRAVENOUS; SUBCUTANEOUS at 05:12

## 2023-01-01 RX ADMIN — Medication 1000 MILLIGRAM(S): at 07:17

## 2023-01-01 RX ADMIN — CEFIDEROCOL SULFATE TOSYLATE 33.33 MILLIGRAM(S): 1 INJECTION, POWDER, FOR SOLUTION INTRAVENOUS at 21:29

## 2023-01-01 RX ADMIN — Medication 10 MILLIGRAM(S): at 09:44

## 2023-01-01 RX ADMIN — CHLORHEXIDINE GLUCONATE 1 APPLICATION(S): 213 SOLUTION TOPICAL at 06:17

## 2023-01-01 RX ADMIN — CHLORHEXIDINE GLUCONATE 1 APPLICATION(S): 213 SOLUTION TOPICAL at 05:22

## 2023-01-01 RX ADMIN — CEFIDEROCOL SULFATE TOSYLATE 33.33 MILLIGRAM(S): 1 INJECTION, POWDER, FOR SOLUTION INTRAVENOUS at 22:37

## 2023-01-01 RX ADMIN — SODIUM CHLORIDE 125 MILLILITER(S): 9 INJECTION, SOLUTION INTRAVENOUS at 02:51

## 2023-01-01 RX ADMIN — POTASSIUM PHOSPHATE, MONOBASIC POTASSIUM PHOSPHATE, DIBASIC 83.33 MILLIMOLE(S): 236; 224 INJECTION, SOLUTION INTRAVENOUS at 06:10

## 2023-01-01 RX ADMIN — HEPARIN SODIUM 5000 UNIT(S): 5000 INJECTION INTRAVENOUS; SUBCUTANEOUS at 14:21

## 2023-01-01 RX ADMIN — Medication 1000 MILLIGRAM(S): at 22:20

## 2023-01-01 RX ADMIN — Medication 50 MILLIEQUIVALENT(S): at 08:18

## 2023-01-01 RX ADMIN — ONDANSETRON 4 MILLIGRAM(S): 8 TABLET, FILM COATED ORAL at 11:15

## 2023-01-01 RX ADMIN — Medication 200 GRAM(S): at 03:16

## 2023-01-01 RX ADMIN — CHLORHEXIDINE GLUCONATE 15 MILLILITER(S): 213 SOLUTION TOPICAL at 17:52

## 2023-01-01 RX ADMIN — Medication 1 EACH: at 21:00

## 2023-01-01 RX ADMIN — HEPARIN SODIUM 5000 UNIT(S): 5000 INJECTION INTRAVENOUS; SUBCUTANEOUS at 06:07

## 2023-01-01 RX ADMIN — PANTOPRAZOLE SODIUM 40 MILLIGRAM(S): 20 TABLET, DELAYED RELEASE ORAL at 06:59

## 2023-01-01 RX ADMIN — CEFIDEROCOL SULFATE TOSYLATE 33.33 MILLIGRAM(S): 1 INJECTION, POWDER, FOR SOLUTION INTRAVENOUS at 06:17

## 2023-01-01 RX ADMIN — Medication 1000 MILLIGRAM(S): at 22:29

## 2023-01-01 RX ADMIN — PIPERACILLIN AND TAZOBACTAM 25 GRAM(S): 4; .5 INJECTION, POWDER, LYOPHILIZED, FOR SOLUTION INTRAVENOUS at 14:09

## 2023-01-01 RX ADMIN — DEXMEDETOMIDINE HYDROCHLORIDE IN 0.9% SODIUM CHLORIDE 1.03 MICROGRAM(S)/KG/HR: 4 INJECTION INTRAVENOUS at 15:30

## 2023-01-01 RX ADMIN — PIPERACILLIN AND TAZOBACTAM 25 GRAM(S): 4; .5 INJECTION, POWDER, LYOPHILIZED, FOR SOLUTION INTRAVENOUS at 14:08

## 2023-01-01 RX ADMIN — PIPERACILLIN AND TAZOBACTAM 25 GRAM(S): 4; .5 INJECTION, POWDER, LYOPHILIZED, FOR SOLUTION INTRAVENOUS at 06:07

## 2023-01-01 RX ADMIN — HEPARIN SODIUM 5000 UNIT(S): 5000 INJECTION INTRAVENOUS; SUBCUTANEOUS at 13:16

## 2023-01-01 RX ADMIN — CHLORHEXIDINE GLUCONATE 15 MILLILITER(S): 213 SOLUTION TOPICAL at 05:43

## 2023-01-01 RX ADMIN — CHLORHEXIDINE GLUCONATE 15 MILLILITER(S): 213 SOLUTION TOPICAL at 17:38

## 2023-01-01 RX ADMIN — Medication 1000 MILLIGRAM(S): at 13:39

## 2023-01-01 RX ADMIN — FENTANYL CITRATE 25 MICROGRAM(S): 50 INJECTION INTRAVENOUS at 17:27

## 2023-01-01 RX ADMIN — CHLORHEXIDINE GLUCONATE 15 MILLILITER(S): 213 SOLUTION TOPICAL at 17:32

## 2023-01-01 RX ADMIN — FENTANYL CITRATE 25 MICROGRAM(S): 50 INJECTION INTRAVENOUS at 21:30

## 2023-01-01 RX ADMIN — Medication 25 GRAM(S): at 00:00

## 2023-01-01 RX ADMIN — CHLORHEXIDINE GLUCONATE 15 MILLILITER(S): 213 SOLUTION TOPICAL at 17:46

## 2023-01-01 RX ADMIN — HEPARIN SODIUM 5000 UNIT(S): 5000 INJECTION INTRAVENOUS; SUBCUTANEOUS at 13:43

## 2023-01-01 RX ADMIN — ROBINUL 0.4 MILLIGRAM(S): 0.2 INJECTION INTRAMUSCULAR; INTRAVENOUS at 11:18

## 2023-01-01 RX ADMIN — Medication 81 MILLIGRAM(S): at 11:09

## 2023-01-01 RX ADMIN — PANTOPRAZOLE SODIUM 40 MILLIGRAM(S): 20 TABLET, DELAYED RELEASE ORAL at 07:46

## 2023-01-01 RX ADMIN — PANTOPRAZOLE SODIUM 40 MILLIGRAM(S): 20 TABLET, DELAYED RELEASE ORAL at 06:07

## 2023-01-01 RX ADMIN — DEXMEDETOMIDINE HYDROCHLORIDE IN 0.9% SODIUM CHLORIDE 2.05 MICROGRAM(S)/KG/HR: 4 INJECTION INTRAVENOUS at 20:51

## 2023-01-01 RX ADMIN — I.V. FAT EMULSION 31.3 GM/KG/DAY: 20 EMULSION INTRAVENOUS at 22:25

## 2023-01-01 RX ADMIN — SODIUM CHLORIDE 1000 MILLILITER(S): 9 INJECTION INTRAMUSCULAR; INTRAVENOUS; SUBCUTANEOUS at 17:50

## 2023-01-01 RX ADMIN — PIPERACILLIN AND TAZOBACTAM 25 GRAM(S): 4; .5 INJECTION, POWDER, LYOPHILIZED, FOR SOLUTION INTRAVENOUS at 06:35

## 2023-01-01 RX ADMIN — Medication 63.75 MILLIMOLE(S): at 06:58

## 2023-01-01 RX ADMIN — PANTOPRAZOLE SODIUM 40 MILLIGRAM(S): 20 TABLET, DELAYED RELEASE ORAL at 07:00

## 2023-01-01 RX ADMIN — HEPARIN SODIUM 5000 UNIT(S): 5000 INJECTION INTRAVENOUS; SUBCUTANEOUS at 05:44

## 2023-01-01 RX ADMIN — CEFIDEROCOL SULFATE TOSYLATE 33.33 MILLIGRAM(S): 1 INJECTION, POWDER, FOR SOLUTION INTRAVENOUS at 21:53

## 2023-01-01 RX ADMIN — CHLORHEXIDINE GLUCONATE 15 MILLILITER(S): 213 SOLUTION TOPICAL at 19:33

## 2023-01-01 RX ADMIN — AMLODIPINE BESYLATE 5 MILLIGRAM(S): 2.5 TABLET ORAL at 11:54

## 2023-01-01 RX ADMIN — ROBINUL 0.2 MILLIGRAM(S): 0.2 INJECTION INTRAMUSCULAR; INTRAVENOUS at 18:54

## 2023-01-01 RX ADMIN — HEPARIN SODIUM 5000 UNIT(S): 5000 INJECTION INTRAVENOUS; SUBCUTANEOUS at 21:53

## 2023-01-01 RX ADMIN — PANTOPRAZOLE SODIUM 40 MILLIGRAM(S): 20 TABLET, DELAYED RELEASE ORAL at 06:45

## 2023-01-01 RX ADMIN — Medication 400 MILLIGRAM(S): at 10:00

## 2023-01-01 RX ADMIN — MIDAZOLAM HYDROCHLORIDE 2 MILLIGRAM(S): 1 INJECTION, SOLUTION INTRAMUSCULAR; INTRAVENOUS at 18:56

## 2023-01-01 RX ADMIN — I.V. FAT EMULSION 31.3 GM/KG/DAY: 20 EMULSION INTRAVENOUS at 21:53

## 2023-01-01 RX ADMIN — Medication 20 MILLIGRAM(S): at 10:41

## 2023-01-01 RX ADMIN — Medication 300 MILLIGRAM(S): at 11:10

## 2023-01-01 RX ADMIN — ROBINUL 0.2 MILLIGRAM(S): 0.2 INJECTION INTRAMUSCULAR; INTRAVENOUS at 06:26

## 2023-01-01 RX ADMIN — SODIUM CHLORIDE 125 MILLILITER(S): 9 INJECTION, SOLUTION INTRAVENOUS at 06:29

## 2023-01-01 RX ADMIN — CEFIDEROCOL SULFATE TOSYLATE 33.33 MILLIGRAM(S): 1 INJECTION, POWDER, FOR SOLUTION INTRAVENOUS at 14:22

## 2023-01-01 RX ADMIN — Medication 1 MILLIGRAM(S): at 23:48

## 2023-01-01 RX ADMIN — Medication 50 MILLIEQUIVALENT(S): at 09:45

## 2023-01-01 RX ADMIN — ATORVASTATIN CALCIUM 80 MILLIGRAM(S): 80 TABLET, FILM COATED ORAL at 22:00

## 2023-01-01 RX ADMIN — HEPARIN SODIUM 5000 UNIT(S): 5000 INJECTION INTRAVENOUS; SUBCUTANEOUS at 22:03

## 2023-01-01 RX ADMIN — HEPARIN SODIUM 5000 UNIT(S): 5000 INJECTION INTRAVENOUS; SUBCUTANEOUS at 13:20

## 2023-01-01 RX ADMIN — GABAPENTIN 100 MILLIGRAM(S): 400 CAPSULE ORAL at 05:43

## 2023-01-01 RX ADMIN — Medication 25 GRAM(S): at 08:13

## 2023-01-01 RX ADMIN — DEXMEDETOMIDINE HYDROCHLORIDE IN 0.9% SODIUM CHLORIDE 1.03 MICROGRAM(S)/KG/HR: 4 INJECTION INTRAVENOUS at 14:25

## 2023-01-01 RX ADMIN — DEXMEDETOMIDINE HYDROCHLORIDE IN 0.9% SODIUM CHLORIDE 1.03 MICROGRAM(S)/KG/HR: 4 INJECTION INTRAVENOUS at 22:52

## 2023-01-01 RX ADMIN — CHLORHEXIDINE GLUCONATE 15 MILLILITER(S): 213 SOLUTION TOPICAL at 05:17

## 2023-01-01 RX ADMIN — FENTANYL CITRATE 25 MICROGRAM(S): 50 INJECTION INTRAVENOUS at 17:02

## 2023-01-01 RX ADMIN — DEXMEDETOMIDINE HYDROCHLORIDE IN 0.9% SODIUM CHLORIDE 1.03 MICROGRAM(S)/KG/HR: 4 INJECTION INTRAVENOUS at 10:34

## 2023-01-01 RX ADMIN — FENTANYL CITRATE 25 MICROGRAM(S): 50 INJECTION INTRAVENOUS at 04:00

## 2023-01-01 RX ADMIN — FENTANYL CITRATE 25 MICROGRAM(S): 50 INJECTION INTRAVENOUS at 23:43

## 2023-01-01 RX ADMIN — CEFIDEROCOL SULFATE TOSYLATE 33.33 MILLIGRAM(S): 1 INJECTION, POWDER, FOR SOLUTION INTRAVENOUS at 13:53

## 2023-01-01 RX ADMIN — Medication 0.5 MILLIGRAM(S): at 11:19

## 2023-01-01 RX ADMIN — Medication 40 MILLIGRAM(S): at 11:17

## 2023-01-01 RX ADMIN — GABAPENTIN 100 MILLIGRAM(S): 400 CAPSULE ORAL at 05:32

## 2023-01-01 RX ADMIN — CEFIDEROCOL SULFATE TOSYLATE 33.33 MILLIGRAM(S): 1 INJECTION, POWDER, FOR SOLUTION INTRAVENOUS at 07:15

## 2023-01-01 RX ADMIN — HYDROMORPHONE HYDROCHLORIDE 0.5 MILLIGRAM(S): 2 INJECTION INTRAMUSCULAR; INTRAVENOUS; SUBCUTANEOUS at 12:12

## 2023-01-01 RX ADMIN — Medication 25 GRAM(S): at 03:29

## 2023-01-01 RX ADMIN — AMLODIPINE BESYLATE 5 MILLIGRAM(S): 2.5 TABLET ORAL at 06:08

## 2023-01-01 RX ADMIN — Medication 1 EACH: at 20:48

## 2023-01-01 RX ADMIN — GABAPENTIN 100 MILLIGRAM(S): 400 CAPSULE ORAL at 05:19

## 2023-01-01 RX ADMIN — HYDROMORPHONE HYDROCHLORIDE 0.5 MILLIGRAM(S): 2 INJECTION INTRAMUSCULAR; INTRAVENOUS; SUBCUTANEOUS at 07:17

## 2023-01-01 RX ADMIN — SODIUM CHLORIDE 1000 MILLILITER(S): 9 INJECTION, SOLUTION INTRAVENOUS at 12:50

## 2023-01-01 RX ADMIN — CHLORHEXIDINE GLUCONATE 1 APPLICATION(S): 213 SOLUTION TOPICAL at 06:32

## 2023-01-01 RX ADMIN — CEFIDEROCOL SULFATE TOSYLATE 33.33 MILLIGRAM(S): 1 INJECTION, POWDER, FOR SOLUTION INTRAVENOUS at 14:39

## 2023-01-01 RX ADMIN — GABAPENTIN 100 MILLIGRAM(S): 400 CAPSULE ORAL at 01:21

## 2023-01-01 RX ADMIN — CHLORHEXIDINE GLUCONATE 15 MILLILITER(S): 213 SOLUTION TOPICAL at 05:44

## 2023-01-01 RX ADMIN — CEFIDEROCOL SULFATE TOSYLATE 33.33 MILLIGRAM(S): 1 INJECTION, POWDER, FOR SOLUTION INTRAVENOUS at 06:07

## 2023-01-01 RX ADMIN — Medication 200 GRAM(S): at 06:14

## 2023-01-01 RX ADMIN — HEPARIN SODIUM 5000 UNIT(S): 5000 INJECTION INTRAVENOUS; SUBCUTANEOUS at 13:54

## 2023-01-01 RX ADMIN — CHLORHEXIDINE GLUCONATE 1 APPLICATION(S): 213 SOLUTION TOPICAL at 05:18

## 2023-01-01 RX ADMIN — FENTANYL CITRATE 25 MICROGRAM(S): 50 INJECTION INTRAVENOUS at 18:44

## 2023-01-01 RX ADMIN — DEXMEDETOMIDINE HYDROCHLORIDE IN 0.9% SODIUM CHLORIDE 1.03 MICROGRAM(S)/KG/HR: 4 INJECTION INTRAVENOUS at 02:51

## 2023-01-01 RX ADMIN — GABAPENTIN 100 MILLIGRAM(S): 400 CAPSULE ORAL at 05:56

## 2023-01-01 RX ADMIN — PANTOPRAZOLE SODIUM 40 MILLIGRAM(S): 20 TABLET, DELAYED RELEASE ORAL at 06:01

## 2023-01-01 RX ADMIN — FENTANYL CITRATE 25 MICROGRAM(S): 50 INJECTION INTRAVENOUS at 21:25

## 2023-01-01 RX ADMIN — HEPARIN SODIUM 5000 UNIT(S): 5000 INJECTION INTRAVENOUS; SUBCUTANEOUS at 21:22

## 2023-01-01 RX ADMIN — HEPARIN SODIUM 5000 UNIT(S): 5000 INJECTION INTRAVENOUS; SUBCUTANEOUS at 05:02

## 2023-01-01 RX ADMIN — CHLORHEXIDINE GLUCONATE 15 MILLILITER(S): 213 SOLUTION TOPICAL at 18:02

## 2023-01-01 RX ADMIN — PANTOPRAZOLE SODIUM 40 MILLIGRAM(S): 20 TABLET, DELAYED RELEASE ORAL at 05:55

## 2023-01-01 RX ADMIN — DEXMEDETOMIDINE HYDROCHLORIDE IN 0.9% SODIUM CHLORIDE 1.03 MICROGRAM(S)/KG/HR: 4 INJECTION INTRAVENOUS at 12:18

## 2023-01-01 RX ADMIN — Medication 1 MILLIGRAM(S): at 08:15

## 2023-01-01 RX ADMIN — LISINOPRIL 20 MILLIGRAM(S): 2.5 TABLET ORAL at 06:46

## 2023-01-01 RX ADMIN — CEFIDEROCOL SULFATE TOSYLATE 33.33 MILLIGRAM(S): 1 INJECTION, POWDER, FOR SOLUTION INTRAVENOUS at 22:10

## 2023-01-01 RX ADMIN — PIPERACILLIN AND TAZOBACTAM 25 GRAM(S): 4; .5 INJECTION, POWDER, LYOPHILIZED, FOR SOLUTION INTRAVENOUS at 13:48

## 2023-01-01 RX ADMIN — CEFIDEROCOL SULFATE TOSYLATE 33.33 MILLIGRAM(S): 1 INJECTION, POWDER, FOR SOLUTION INTRAVENOUS at 05:17

## 2023-01-01 RX ADMIN — Medication 125 MILLILITER(S): at 03:49

## 2023-01-01 RX ADMIN — Medication 1 MILLIGRAM(S): at 13:06

## 2023-01-01 RX ADMIN — Medication 1.54 MICROGRAM(S)/KG/MIN: at 12:11

## 2023-01-01 RX ADMIN — CEFIDEROCOL SULFATE TOSYLATE 33.33 MILLIGRAM(S): 1 INJECTION, POWDER, FOR SOLUTION INTRAVENOUS at 13:52

## 2023-01-01 RX ADMIN — SODIUM CHLORIDE 1000 MILLILITER(S): 9 INJECTION INTRAMUSCULAR; INTRAVENOUS; SUBCUTANEOUS at 15:56

## 2023-01-01 RX ADMIN — PANTOPRAZOLE SODIUM 40 MILLIGRAM(S): 20 TABLET, DELAYED RELEASE ORAL at 06:22

## 2023-01-01 RX ADMIN — HEPARIN SODIUM 5000 UNIT(S): 5000 INJECTION INTRAVENOUS; SUBCUTANEOUS at 15:16

## 2023-01-01 RX ADMIN — Medication 1000 MILLIGRAM(S): at 05:25

## 2023-01-01 RX ADMIN — CEFIDEROCOL SULFATE TOSYLATE 33.33 MILLIGRAM(S): 1 INJECTION, POWDER, FOR SOLUTION INTRAVENOUS at 02:54

## 2023-01-01 RX ADMIN — CHLORHEXIDINE GLUCONATE 1 APPLICATION(S): 213 SOLUTION TOPICAL at 05:14

## 2023-01-01 RX ADMIN — CHLORHEXIDINE GLUCONATE 1 APPLICATION(S): 213 SOLUTION TOPICAL at 05:02

## 2023-01-01 RX ADMIN — GABAPENTIN 100 MILLIGRAM(S): 400 CAPSULE ORAL at 21:38

## 2023-01-01 RX ADMIN — Medication 3.85 MICROGRAM(S)/KG/MIN: at 22:21

## 2023-01-01 RX ADMIN — HEPARIN SODIUM 5000 UNIT(S): 5000 INJECTION INTRAVENOUS; SUBCUTANEOUS at 22:17

## 2023-01-01 RX ADMIN — DEXMEDETOMIDINE HYDROCHLORIDE IN 0.9% SODIUM CHLORIDE 1.03 MICROGRAM(S)/KG/HR: 4 INJECTION INTRAVENOUS at 04:09

## 2023-01-01 RX ADMIN — Medication 5 MILLIGRAM(S): at 22:51

## 2023-01-01 RX ADMIN — FENTANYL CITRATE 25 MICROGRAM(S): 50 INJECTION INTRAVENOUS at 00:37

## 2023-01-01 RX ADMIN — Medication 1000 MILLIGRAM(S): at 22:59

## 2023-01-01 RX ADMIN — Medication 300 MILLIGRAM(S): at 12:11

## 2023-01-01 RX ADMIN — HEPARIN SODIUM 5000 UNIT(S): 5000 INJECTION INTRAVENOUS; SUBCUTANEOUS at 05:55

## 2023-01-01 RX ADMIN — DEXMEDETOMIDINE HYDROCHLORIDE IN 0.9% SODIUM CHLORIDE 1.03 MICROGRAM(S)/KG/HR: 4 INJECTION INTRAVENOUS at 21:41

## 2023-01-01 RX ADMIN — Medication 20 MILLIGRAM(S): at 15:27

## 2023-01-01 RX ADMIN — Medication 1000 MILLIGRAM(S): at 05:56

## 2023-01-01 RX ADMIN — CEFIDEROCOL SULFATE TOSYLATE 33.33 MILLIGRAM(S): 1 INJECTION, POWDER, FOR SOLUTION INTRAVENOUS at 05:13

## 2023-01-01 RX ADMIN — AMLODIPINE BESYLATE 5 MILLIGRAM(S): 2.5 TABLET ORAL at 05:26

## 2023-01-01 RX ADMIN — FENTANYL CITRATE 25 MICROGRAM(S): 50 INJECTION INTRAVENOUS at 22:30

## 2023-01-01 RX ADMIN — HEPARIN SODIUM 5000 UNIT(S): 5000 INJECTION INTRAVENOUS; SUBCUTANEOUS at 05:09

## 2023-01-01 RX ADMIN — SODIUM CHLORIDE 125 MILLILITER(S): 9 INJECTION, SOLUTION INTRAVENOUS at 16:07

## 2023-01-01 RX ADMIN — Medication 50 MILLIEQUIVALENT(S): at 04:08

## 2023-01-01 RX ADMIN — CHLORHEXIDINE GLUCONATE 15 MILLILITER(S): 213 SOLUTION TOPICAL at 17:56

## 2023-01-01 RX ADMIN — SIMVASTATIN 20 MILLIGRAM(S): 20 TABLET, FILM COATED ORAL at 21:33

## 2023-01-01 RX ADMIN — SIMVASTATIN 20 MILLIGRAM(S): 20 TABLET, FILM COATED ORAL at 23:33

## 2023-01-01 RX ADMIN — CEFIDEROCOL SULFATE TOSYLATE 33.33 MILLIGRAM(S): 1 INJECTION, POWDER, FOR SOLUTION INTRAVENOUS at 21:40

## 2023-01-01 RX ADMIN — SODIUM CHLORIDE 3000 MILLILITER(S): 9 INJECTION, SOLUTION INTRAVENOUS at 13:50

## 2023-01-01 RX ADMIN — CEFIDEROCOL SULFATE TOSYLATE 33.33 MILLIGRAM(S): 1 INJECTION, POWDER, FOR SOLUTION INTRAVENOUS at 01:24

## 2023-01-01 RX ADMIN — PIPERACILLIN AND TAZOBACTAM 25 GRAM(S): 4; .5 INJECTION, POWDER, LYOPHILIZED, FOR SOLUTION INTRAVENOUS at 14:15

## 2023-01-01 RX ADMIN — HEPARIN SODIUM 5000 UNIT(S): 5000 INJECTION INTRAVENOUS; SUBCUTANEOUS at 06:05

## 2023-01-01 RX ADMIN — Medication 1000 MILLIGRAM(S): at 14:12

## 2023-01-01 RX ADMIN — CHLORHEXIDINE GLUCONATE 15 MILLILITER(S): 213 SOLUTION TOPICAL at 17:29

## 2023-01-01 RX ADMIN — PIPERACILLIN AND TAZOBACTAM 25 GRAM(S): 4; .5 INJECTION, POWDER, LYOPHILIZED, FOR SOLUTION INTRAVENOUS at 14:52

## 2023-01-01 RX ADMIN — Medication 63.75 MILLIMOLE(S): at 01:43

## 2023-01-01 RX ADMIN — HEPARIN SODIUM 5000 UNIT(S): 5000 INJECTION INTRAVENOUS; SUBCUTANEOUS at 21:38

## 2023-01-01 RX ADMIN — FENTANYL CITRATE 25 MICROGRAM(S): 50 INJECTION INTRAVENOUS at 03:21

## 2023-01-01 RX ADMIN — HEPARIN SODIUM 5000 UNIT(S): 5000 INJECTION INTRAVENOUS; SUBCUTANEOUS at 21:59

## 2023-01-01 RX ADMIN — CHLORHEXIDINE GLUCONATE 1 APPLICATION(S): 213 SOLUTION TOPICAL at 05:56

## 2023-01-01 RX ADMIN — CHLORHEXIDINE GLUCONATE 15 MILLILITER(S): 213 SOLUTION TOPICAL at 18:52

## 2023-01-01 RX ADMIN — FENTANYL CITRATE 25 MICROGRAM(S): 50 INJECTION INTRAVENOUS at 15:16

## 2023-01-01 RX ADMIN — HEPARIN SODIUM 5000 UNIT(S): 5000 INJECTION INTRAVENOUS; SUBCUTANEOUS at 13:33

## 2023-01-01 RX ADMIN — DEXMEDETOMIDINE HYDROCHLORIDE IN 0.9% SODIUM CHLORIDE 1.03 MICROGRAM(S)/KG/HR: 4 INJECTION INTRAVENOUS at 18:04

## 2023-01-01 RX ADMIN — DEXMEDETOMIDINE HYDROCHLORIDE IN 0.9% SODIUM CHLORIDE 1.03 MICROGRAM(S)/KG/HR: 4 INJECTION INTRAVENOUS at 23:49

## 2023-01-01 RX ADMIN — PANTOPRAZOLE SODIUM 40 MILLIGRAM(S): 20 TABLET, DELAYED RELEASE ORAL at 06:20

## 2023-01-01 RX ADMIN — Medication 10 MILLIGRAM(S): at 23:39

## 2023-01-01 RX ADMIN — GABAPENTIN 100 MILLIGRAM(S): 400 CAPSULE ORAL at 14:23

## 2023-01-01 RX ADMIN — Medication 50 MILLIEQUIVALENT(S): at 17:00

## 2023-01-01 RX ADMIN — Medication 1000 MILLIGRAM(S): at 15:00

## 2023-01-01 RX ADMIN — Medication 1000 MILLIGRAM(S): at 23:30

## 2023-01-01 RX ADMIN — PANTOPRAZOLE SODIUM 40 MILLIGRAM(S): 20 TABLET, DELAYED RELEASE ORAL at 06:02

## 2023-01-01 RX ADMIN — HEPARIN SODIUM 5000 UNIT(S): 5000 INJECTION INTRAVENOUS; SUBCUTANEOUS at 14:08

## 2023-01-01 RX ADMIN — Medication 1000 MILLIGRAM(S): at 05:32

## 2023-01-01 RX ADMIN — PANTOPRAZOLE SODIUM 40 MILLIGRAM(S): 20 TABLET, DELAYED RELEASE ORAL at 05:45

## 2023-01-01 RX ADMIN — Medication 81 MILLIGRAM(S): at 13:12

## 2023-01-01 RX ADMIN — SODIUM CHLORIDE 1000 MILLILITER(S): 9 INJECTION, SOLUTION INTRAVENOUS at 06:08

## 2023-01-01 RX ADMIN — SODIUM CHLORIDE 500 MILLILITER(S): 9 INJECTION, SOLUTION INTRAVENOUS at 08:55

## 2023-01-01 RX ADMIN — PANTOPRAZOLE SODIUM 40 MILLIGRAM(S): 20 TABLET, DELAYED RELEASE ORAL at 08:27

## 2023-01-01 RX ADMIN — PIPERACILLIN AND TAZOBACTAM 25 GRAM(S): 4; .5 INJECTION, POWDER, LYOPHILIZED, FOR SOLUTION INTRAVENOUS at 21:33

## 2023-01-01 RX ADMIN — HYDROMORPHONE HYDROCHLORIDE 0.5 MILLIGRAM(S): 2 INJECTION INTRAMUSCULAR; INTRAVENOUS; SUBCUTANEOUS at 22:59

## 2023-01-01 RX ADMIN — CHLORHEXIDINE GLUCONATE 1 APPLICATION(S): 213 SOLUTION TOPICAL at 06:05

## 2023-01-01 RX ADMIN — Medication 125 MILLILITER(S): at 10:36

## 2023-01-01 RX ADMIN — Medication 1 EACH: at 11:17

## 2023-01-01 RX ADMIN — Medication 25 GRAM(S): at 14:04

## 2023-02-09 NOTE — ED PROVIDER NOTE - ST/T WAVE
Please call your insurance company to see if varicella titer lab is covered, so we can check them, and have you get the Shingrix (new Shingles vaccine) at the pharmacy.  
no LON

## 2023-02-09 NOTE — ED PROVIDER NOTE - PHYSICAL EXAMINATION
Afebrile, hemodynamically stable, saturating 93% on room air  NAD, nontoxic appearing, sitting comfortably in bed, no WOB/tachypnea, speaking full sentences  Head NCAT  EOMI grossly, anicteric  MMM  No JVD  RRR, nml S1/S2, no m/r/g  Lungs CTAB, no w/r/r  Abd soft, generalized epigastric TTP without focality, ND, nml BS, no rebound or guarding or Cespedes's  AAO, CN's 3-12 grossly intact  DAILEY spontaneously, no leg cyanosis or edema, 2+ symmetric radial pulses  Skin warm, well perfused, no rashes or hives

## 2023-02-09 NOTE — ED PROVIDER NOTE - CARE PLAN
1 Principal Discharge DX:	Pneumatosis intestinalis  Secondary Diagnosis:	SBO (small bowel obstruction)  Secondary Diagnosis:	GERA (acute kidney injury)  Secondary Diagnosis:	Moderate dehydration

## 2023-02-09 NOTE — ED PROVIDER NOTE - OBJECTIVE STATEMENT
82-year-old female with history of CVA on aspirin and no other antiplatelet/anticoagulant agents, hypertension, hyperlipidemia, chronic UTIs, past cholecystectomy, hysterectomy, bladder lift, presents with epigastric pain, nonradiating, sharp in character, intermittent, x3 days. Associated NBNB emesis. Reports has had no no bowel movement for the past 3 days, though is passing gas well. On review of systems reports she often feels short of breath, states has not increased at this time though daughter thinks has increased over the past few days. Denies hematochezia, hematemesis, chest pain, cough change from her chronic cough, fever, leg pain or swelling, recent long distance travel, urinary symptoms, and all other symptoms.
Yes

## 2023-02-09 NOTE — ED PROVIDER NOTE - CLINICAL SUMMARY MEDICAL DECISION MAKING FREE TEXT BOX
Character and context with constipation low suspicion for cardiac process including ACS or PE, and troponin negative. No evidence of fluid overload on exam or chest x-ray. Patient with no specific urinary signs/symptoms. Character and appearance low suspicion for AAA, dissection, or mesenteric ischemia to warrant CT angio. Character and context with constipation low suspicion for cardiac process including ACS or PE, and troponin negative. No evidence of fluid overload on exam or chest x-ray. Patient with no specific urinary signs/symptoms. Character and appearance low suspicion for AAA, dissection, or mesenteric ischemia to warrant CT angio. Character concerning for SBO, noted on CT. Also noted pneumatosis. Seen by surgical team and Dr. Rebollar and will admit to surgical stepdown due to age/risk factors with pneumatosis and requests IV abx, family opting against OR at this time. Patient nontoxic appearing, hemodynamically stable, no more vomiting noted. Admitted to surgery for further monitoring, w/u, and care.

## 2023-02-10 NOTE — H&P ADULT - ASSESSMENT
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 Fluid-filled distended SB loops w/ transition to normal bowel along ileum in mid pelvis concerning for SBO. No pneumoperitoneum.    Plan:  - Pain control  - Endoscopy vs. OR  - IVF  - Bowel rest  - Discussed with attending, family, and ED    [id number]   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 Fluid-filled distended SB loops w/ transition to normal bowel along ileum in mid pelvis concerning for SBO. No pneumoperitoneum.    Plan:  - Admit to SDU  - Close monitoring  - Serial abdominal exams  - Pain control  - IVF  - Bowel rest  - Discussed with attending, family, and ED    [id number]

## 2023-02-10 NOTE — H&P ADULT - NSICDXPASTMEDICALHX_GEN_ALL_CORE_FT
PAST MEDICAL HISTORY:  Atrial fibrillation     CVA (cerebrovascular accident)     GERD (gastroesophageal reflux disease)     HLD (hyperlipidemia)     Hypertension

## 2023-02-10 NOTE — H&P ADULT - NSICDXPASTSURGICALHX_GEN_ALL_CORE_FT
PAST SURGICAL HISTORY:  History of bladder suspension procedure     History of cholecystectomy     History of hysterectomy

## 2023-02-10 NOTE — H&P ADULT - NSHPPHYSICALEXAM_GEN_ALL_CORE
Bowel Movement: : [] YES [] NO  Flatus: : [] YES [] NO    PHYSICAL EXAM:  Vitals:   T(F): 97 (02-09-23 @ 23:36), Max: 97.8 (02-09-23 @ 19:05)  HR: 87 (02-09-23 @ 23:36)  BP: 127/58 (02-09-23 @ 23:36)  RR: 19 (02-09-23 @ 23:36)  SpO2: 95% (02-09-23 @ 23:36)    General: NAD, AAOx3, calm and cooperative  HEENT: NCAT, ELVIN, EOMI, Trachea ML, Neck supple  Cardiac: RRR S1, S2, no Murmurs, rubs or gallops  Respiratory: CTAB, normal respiratory effort, breath sounds equal BL, no wheeze, rhonchi or crackles  Abdomen: Soft, non-distended, non-tender, no rebound, no guarding. +BS.  Rectal: Good tone, +stool, no blood, no shantelle-anal masses/lesions, no fistulas, fissures, hemorrhoids  Musculoskeletal: Strength 5/5 BL UE/LE, ROM intact, compartments soft  Neuro: Sensation grossly intact and equal throughout, no focal deficits  Vascular: Pulses 2+ throughout, extremities well perfused  Skin: Warm/dry, normal color, no jaundice  Incision/wound: healing well, dressings in place, clean, dry and intact

## 2023-02-10 NOTE — H&P ADULT - ATTENDING COMMENTS
patient seen and evaluated. had significant R side pain and tenderness with pneumatosis of R colon and sbo. I recommended surgery to evaluate R colon. patient refused surgery and wanted to be watched and treated nonoperatively (patient and son understood the consequences of ischemic colon. will admit, npo, serial abdominal exam. follow closely in SDU.

## 2023-02-10 NOTE — H&P ADULT - HISTORY OF PRESENT ILLNESS
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 She has had nausea and emesis which she described as bilious associated with the pain. She has had no bowel movements for the past three days but states that she has had flatus as soon as today.  She reports having diarrhea five days ago. Patient denied hematochezia, hematemesis, chest pain, cough change from chronic baseline, fever, sick contacts, nor urinary symptoms.

## 2023-02-10 NOTE — H&P ADULT - NSHPLABSRESULTS_GEN_ALL_CORE
LAB/STUDIES:  Labs:  CAPILLARY BLOOD GLUCOSE                          13.0   16.04 )-----------( 405      ( 09 Feb 2023 22:16 )             41.0       Auto Neutrophil %: 86.2 % (02-09-23 @ 22:16)  Auto Immature Granulocyte %: 0.5 % (02-09-23 @ 22:16)    02-10    138  |  102  |  35<H>  ----------------------------<  119<H>  4.3   |  22  |  1.9<H>      Calcium, Total Serum: 9.2 mg/dL (02-10-23 @ 00:10)      LFTs:             7.7  | 0.4  | 43       ------------------[106     ( 09 Feb 2023 22:16 )  4.4  | <0.2 | 10          Lipase:32     Amylase:x         Blood Gas Venous - Lactate: 1.20 mmol/L (02-09-23 @ 23:12)  Lactate, Blood: 1.4 mmol/L (02-09-23 @ 22:16)      Coags:     11.10  ----< 0.97    ( 10 Feb 2023 00:10 )     29.5        CARDIAC MARKERS ( 09 Feb 2023 22:16 )  x     / <0.01 ng/mL / x     / x     / x          Serum Pro-Brain Natriuretic Peptide: 950 pg/mL (02-09-23 @ 22:16)    IMAGING:  < from: CT Abdomen and Pelvis w/ IV Cont (02.10.23 @ 00:04) >  IMPRESSION:    Pneumatosis intestinalis of the cecum and ascending colon.    Fluid-filled distended small bowel loops with a transition to normal   bowel along the ileum in the mid pelvis concerning for small bowel   obstruction. No pneumoperitoneum.      Spoke with YAEL BARTHOLOMEW MD; Attending Em on 2/10/2023 12:33 AM with   readback.    --- End of Report ---    ***Please see the addendum at the top of this report. 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    Spoke with YAEL BARTHOLOMEW MD; Attending Em on 2/10/2023 1:07 AM with   readback.    --- End of Report ---    *** END OF ADDENDUM # 1 ***  < end of copied text >

## 2023-02-11 NOTE — PATIENT PROFILE ADULT - FALL HARM RISK - HARM RISK INTERVENTIONS

## 2023-02-11 NOTE — PROGRESS NOTE ADULT - SUBJECTIVE AND OBJECTIVE BOX
GENERAL SURGERY PROGRESS NOTE    Patient: RUBIO HUGHES , 82y ([birth year])Female   MRN: [id number]  Location: [facility] ED Hold 098 A  Visit: 02-10-23 Inpatient  Date: 02-11-23 @ 02:36    Events of past 24 hours:  NAEON  Denies nausea/vomiting.  Denies flatus/BM.  Denies OOB.  Voiding.    PAST MEDICAL & SURGICAL HISTORY:  Atrial fibrillation    Hypertension    CVA (cerebrovascular accident)    HLD (hyperlipidemia)    GERD (gastroesophageal reflux disease)    History of cholecystectomy    History of hysterectomy    History of bladder suspension procedure      Vitals:   T(F): 97.8 (02-10-23 @ 16:12), Max: 97.8 (02-10-23 @ 16:12)  HR: 87 (02-11-23 @ 00:00)  BP: 124/59 (02-11-23 @ 00:00)  RR: 18 (02-11-23 @ 00:00)  SpO2: 98% (02-11-23 @ 00:00)      Diet, NPO:   Except Medications      Fluids: lactated ringers.: Solution, 1000 milliLiter(s) infuse at 120 mL/Hr    PHYSICAL EXAM:  General: NAD, calm and cooperative.  Cardiac: RRR.  Respiratory: On RA. Speaks in complete sentences. No accessory muscles of respiration in use. Bilateral chest rise.  Abdomen: Soft, moderately distended, non-tender, no rebound, no guarding.   Neuro: Moves all extremities.  Skin: Warm/dry, normal color, no jaundice.      MEDICATIONS  (STANDING):  amLODIPine   Tablet 5 milliGRAM(s) Oral every 24 hours  heparin   Injectable 5000 Unit(s) SubCutaneous every 8 hours  lactated ringers. 1000 milliLiter(s) (120 mL/Hr) IV Continuous <Continuous>  lisinopril 20 milliGRAM(s) Oral daily  pantoprazole  Injectable 40 milliGRAM(s) IV Push every 24 hours  piperacillin/tazobactam IVPB.. 3.375 Gram(s) IV Intermittent every 8 hours  simvastatin 20 milliGRAM(s) Oral at bedtime    MEDICATIONS  (PRN):  acetaminophen     Tablet .. 650 milliGRAM(s) Oral every 6 hours PRN Temp greater or equal to 38C (100.4F), Mild Pain (1 - 3)      DVT PROPHYLAXIS: heparin   Injectable 5000 Unit(s) SubCutaneous every 8 hours    GI PROPHYLAXIS: pantoprazole  Injectable 40 milliGRAM(s) IV Push every 24 hours    ANTICOAGULATION:   ANTIBIOTICS:  piperacillin/tazobactam IVPB.. 3.375 Gram(s)      LAB/STUDIES:  Labs:  CAPILLARY BLOOD GLUCOSE                          13.0   16.04 )-----------( 405      ( 09 Feb 2023 22:16 )             41.0         02-10    138  |  102  |  35<H>  ----------------------------<  119<H>  4.3   |  22  |  1.9<H>          LFTs:             7.7  | 0.4  | 43       ------------------[106     ( 09 Feb 2023 22:16 )  4.4  | <0.2 | 10          Lipase:32     Amylase:x         Blood Gas Venous - Lactate: 1.20 mmol/L (02-09-23 @ 23:12)  Lactate, Blood: 1.4 mmol/L (02-09-23 @ 22:16)      Coags:     11.10  ----< 0.97    ( 10 Feb 2023 00:10 )     29.5        CARDIAC MARKERS ( 09 Feb 2023 22:16 )  x     / <0.01 ng/mL / x     / x     / x          Serum Pro-Brain Natriuretic Peptide: 950 pg/mL (02-09-23 @ 22:16)      Urinalysis Basic - ( 10 Feb 2023 10:20 )    Color: Yellow / Appearance: Clear / SG: >1.050 / pH: x  Gluc: x / Ketone: Negative  / Bili: Small / Urobili: 3 mg/dL   Blood: x / Protein: 30 mg/dL / Nitrite: Negative   Leuk Esterase: Negative / RBC: 2 /HPF / WBC 10 /HPF   Sq Epi: x / Non Sq Epi: 3 /HPF / Bacteria: Few      IMAGING:  < from: Xray Abdomen Minimum 3 Views (02.10.23 @ 13:52) >  Impression:    No radiographic evidence for acute cardiopulmonary process.    Redemonstrated dilated small and large bowel to the level of the left   colon.    < end of copied text >      < from: CT Abdomen and Pelvis w/ IV Cont (02.10.23 @ 00:04) >  IMPRESSION:    Pneumatosis intestinalis of the cecum and ascending colon.    Fluid-filled distended small bowel loops with a transition to normal  bowel along the ileum in the mid pelvis concerning for small bowel   obstruction. No pneumoperitoneum.    < end of copied text >

## 2023-02-11 NOTE — PROGRESS NOTE ADULT - ATTENDING COMMENTS
ACS Attending  Note Attestation    Patient is examined and evaluated at the bedside with the residents/PAs. Treatment plan discussed with the team, nurses, and consulting physicians and consulting teams. Medications, radiological studies and all other relevant studies reviewed.     RUBIO HUGHES Patient is a 82y old  Female who presents with a chief complaint of RLQ pain, found to have cecal pneumonosis with SBO. minimal discomfort in RLQ and as per patient significantly improved. patient declined surgery on admission.     Vital Signs Last 24 Hrs  T(C): 36.6 (11 Feb 2023 08:16), Max: 36.6 (10 Feb 2023 16:12)  T(F): 97.9 (11 Feb 2023 08:16), Max: 97.9 (11 Feb 2023 08:16)  HR: 88 (11 Feb 2023 08:16) (62 - 88)  BP: 155/74 (11 Feb 2023 08:16) (124/59 - 155/74)  BP(mean): --  RR: 18 (11 Feb 2023 08:16) (18 - 18)  SpO2: 98% (11 Feb 2023 08:16) (94% - 98%)    Parameters below as of 11 Feb 2023 08:16  Patient On (Oxygen Delivery Method): room air                        10.1   10.38 )-----------( 291      ( 11 Feb 2023 07:43 )             32.5     02-10    138  |  102  |  35<H>  ----------------------------<  119<H>  4.3   |  22  |  1.9<H>    Ca    9.2      10 Feb 2023 00:10    TPro  7.7  /  Alb  4.4  /  TBili  0.4  /  DBili  <0.2  /  AST  43<H>  /  ALT  10  /  AlkPhos  106  02-09      Diagnosis: SBO                  GERA    Plan:  - continue NPO  - obstructive series  - IV Fluids 	  - supportive care  - GI/DVT prophylaxis  - pain management  - incentive spirometer    - follow up consults  - repeat studies as needed  - replace electrolytes  - case management evaluation     Liliana Feldman MD, FACS  Trauma/ACS/Surgical Critical Care Attending ACS Attending  Note Attestation    Patient is examined and evaluated at the bedside with the residents/PAs. Treatment plan discussed with the team, nurses, and consulting physicians and consulting teams. Medications, radiological studies and all other relevant studies reviewed.     RUBIO HUGHES Patient is a 82y old  Female who presents with a chief complaint of RLQ pain, found to have cecal pneumonosis with SBO. minimal discomfort in RLQ and as per patient significantly improved. patient declined surgery on admission.     Vital Signs Last 24 Hrs  T(C): 36.6 (11 Feb 2023 08:16), Max: 36.6 (10 Feb 2023 16:12)  T(F): 97.9 (11 Feb 2023 08:16), Max: 97.9 (11 Feb 2023 08:16)  HR: 88 (11 Feb 2023 08:16) (62 - 88)  BP: 155/74 (11 Feb 2023 08:16) (124/59 - 155/74)  BP(mean): --  RR: 18 (11 Feb 2023 08:16) (18 - 18)  SpO2: 98% (11 Feb 2023 08:16) (94% - 98%)    Parameters below as of 11 Feb 2023 08:16  Patient On (Oxygen Delivery Method): room air                            10.1   10.38 )-----------( 291      ( 11 Feb 2023 07:43 )             32.5   02-11    137  |  105  |  29<H>  ----------------------------<  91  4.7   |  20  |  1.2    Ca    8.6      11 Feb 2023 07:43  Phos  3.8     02-11  Mg     2.1     02-11    TPro  7.7  /  Alb  4.4  /  TBili  0.4  /  DBili  <0.2  /  AST  43<H>  /  ALT  10  /  AlkPhos  106  02-09        Diagnosis: SBO                  GERA    Plan:  - continue NPO  - obstructive series  - IV Fluids 	  - supportive care  - GI/DVT prophylaxis  - pain management  - incentive spirometer    - follow up consults  - repeat studies as needed  - replace electrolytes  - case management evaluation     Liliana Feldman MD, FACS  Trauma/ACS/Surgical Critical Care Attending

## 2023-02-11 NOTE — PROGRESS NOTE ADULT - ASSESSMENT
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 Fluid-filled distended SB loops w/ transition to normal bowel along ileum in mid pelvis concerning for SBO. No pneumoperitoneum.    Plan:  - given contrast, f/u AM KUB  - Serial abdominal exams  - Pain control  - Attempts at placing NGT have failed but patient no longer having emesis  - IVF  - Bowel rest      [id number]

## 2023-02-12 NOTE — PROGRESS NOTE ADULT - ATTENDING COMMENTS
Has no pain.  Abdomen sill distended   No gas or BMs  KUB yesterday shows same SB dilatation.    Abdomen: moderately distended, nontender    ASSESSMENT:  81 y/o female with SBO.  Pneumatosis intestinalis.    PLAN:  - NPO, IVF  - strict I/Os  - intermittent abd exams  - DVT prophylaxis  - follow electrolytes and UOP  - incentive spirometer  - aspiration precautions  Patient refusing NGT understanding the risks  Family at bedside updated.

## 2023-02-12 NOTE — DIETITIAN INITIAL EVALUATION ADULT - PERTINENT LABORATORY DATA
02-12    139  |  107  |  18  ----------------------------<  137<H>  3.9   |  20  |  1.0    Ca    8.6      12 Feb 2023 01:32  Phos  3.1     02-12  Mg     2.1     02-12

## 2023-02-12 NOTE — DIETITIAN INITIAL EVALUATION ADULT - NSFNSGIIOFT_GEN_A_CORE
Dx: 81y/o female with h/o CVA (6years ago with residual loss of taste and left 3,4,5 digit numbness) on aspirin and no other antiplatelet/anticoagulant agents,  GERD, HTN, HLD, chronic UTIs, pasty cholecystectomy(>20yrs ago), hysterectomy (>45yrs ago) and bladder lift (>30yrs ago) presented to the ED with a 3 day history of intermittent sharp, non-radiating epigastric pain. Noted to have emesis. S/p imaging, results show pneumatosis intestinalis of cecum and ascending colon; fluid-filled distended SB loops with transition to normal bowel along ileum in mid pelvis concerning for SBO. No pneumoperitoneum noted. Attempts at placing an NG Tube have failed but the patient no longer having emesis. MAP-105.

## 2023-02-12 NOTE — PROGRESS NOTE ADULT - SUBJECTIVE AND OBJECTIVE BOX
GENERAL SURGERY PROGRESS NOTE    Patient: RUBIO HUGHES , 82y ([birth year])Female   MRN: [id number]  Location: [place] [address] A  Visit: 02-10-23 Inpatient  Date: 02-12-23 @ 05:37    Events of past 24 hours:  Patient seen and examined at bedside  Patient hemodynamically stable  Patient has passed flatus, no BM  Patient reports OOB and ambualtory  No repletions overnight    PAST MEDICAL & SURGICAL HISTORY:  Atrial fibrillation      Hypertension      CVA (cerebrovascular accident)      HLD (hyperlipidemia)      GERD (gastroesophageal reflux disease)      History of cholecystectomy      History of hysterectomy      History of bladder suspension procedure          Vitals:   T(F): 97.2 (02-11-23 @ 23:17), Max: 98 (02-11-23 @ 20:00)  HR: 93 (02-12-23 @ 00:00)  BP: 142/64 (02-12-23 @ 00:00)  RR: 18 (02-11-23 @ 22:00)  SpO2: 95% (02-12-23 @ 00:00)      Diet, NPO:   Except Medications      Fluids: dextrose 5% + sodium chloride 0.45%.: Solution, 1000 milliLiter(s) infuse at 125 mL/Hr  Provider's Contact #: [phone]      I & O's:      PHYSICAL EXAM:  General: NAD, calm and cooperative.  Cardiac: RRR.  Respiratory: On RA. Speaks in complete sentences. No accessory muscles of respiration in use. Bilateral chest rise.  Abdomen: Soft, moderately distended, non-tender, no rebound, no guarding.   Neuro: Moves all extremities.  Skin: Warm/dry, normal color, no jaundice.    MEDICATIONS  (STANDING):  amLODIPine   Tablet 5 milliGRAM(s) Oral every 24 hours  dextrose 5% + sodium chloride 0.45%. 1000 milliLiter(s) (125 mL/Hr) IV Continuous <Continuous>  heparin   Injectable 5000 Unit(s) SubCutaneous every 8 hours  lisinopril 20 milliGRAM(s) Oral daily  pantoprazole  Injectable 40 milliGRAM(s) IV Push every 24 hours  piperacillin/tazobactam IVPB.. 3.375 Gram(s) IV Intermittent every 8 hours  simvastatin 20 milliGRAM(s) Oral at bedtime    MEDICATIONS  (PRN):  acetaminophen     Tablet .. 650 milliGRAM(s) Oral every 6 hours PRN Temp greater or equal to 38C (100.4F), Mild Pain (1 - 3)      DVT PROPHYLAXIS: heparin   Injectable 5000 Unit(s) SubCutaneous every 8 hours    GI PROPHYLAXIS: pantoprazole  Injectable 40 milliGRAM(s) IV Push every 24 hours    ANTICOAGULATION:   ANTIBIOTICS:  piperacillin/tazobactam IVPB.. 3.375 Gram(s)      LAB/STUDIES:  Labs:  CAPILLARY BLOOD GLUCOSE               10.5   10.53 )-----------( 338      ( 12 Feb 2023 01:32 )             33.2       Auto Neutrophil %: 77.7 % (02-12-23 @ 01:32)  Auto Immature Granulocyte %: 0.3 % (02-12-23 @ 01:32)  Auto Neutrophil %: 79.6 % (02-11-23 @ 07:43)  Auto Immature Granulocyte %: 0.4 % (02-11-23 @ 07:43)    02-12    139  |  107  |  18  ----------------------------<  137<H>  3.9   |  20  |  1.0      Calcium, Total Serum: 8.6 mg/dL (02-12-23 @ 01:32)      LFTs:     Blood Gas Venous - Lactate: 1.20 mmol/L (02-09-23 @ 23:12)  Lactate, Blood: 1.4 mmol/L (02-09-23 @ 22:16)      Coags:        Serum Pro-Brain Natriuretic Peptide: 950 pg/mL (02-09-23 @ 22:16)      Urinalysis Basic - ( 10 Feb 2023 10:20 )    Color: Yellow / Appearance: Clear / SG: >1.050 / pH: x  Gluc: x / Ketone: Negative  / Bili: Small / Urobili: 3 mg/dL   Blood: x / Protein: 30 mg/dL / Nitrite: Negative   Leuk Esterase: Negative / RBC: 2 /HPF / WBC 10 /HPF   Sq Epi: x / Non Sq Epi: 3 /HPF / Bacteria: Few        Culture - Urine (collected 10 Feb 2023 10:20)  Source: Clean Catch Clean Catch (Midstream)  Final Report (12 Feb 2023 00:46):    <10,000 CFU/mL Normal Urogenital Latricia

## 2023-02-12 NOTE — PROGRESS NOTE ADULT - ASSESSMENT
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 Fluid-filled distended SB loops w/ transition to normal bowel along ileum in mid pelvis concerning for SBO. No pneumoperitoneum.    Plan:  - Serial abdominal exams  - Pain control  - Attempts at placing NGT have failed but patient no longer having emesis  - IVF  - Bowel rest      [id number]

## 2023-02-12 NOTE — DIETITIAN INITIAL EVALUATION ADULT - ORAL INTAKE PTA/DIET HISTORY
I spoke to the family, who is present. Per family, the patient reports following a regular diet at home; consumed two meals at >75%; denies MVI use.

## 2023-02-12 NOTE — DIETITIAN INITIAL EVALUATION ADULT - COLLABORATION WITH OTHER PROVIDERS
I spoke to the patient's physician name Dr. Boyle ([facility] #[id number]) regarding my nutritional recommendations.

## 2023-02-12 NOTE — CHART NOTE - NSCHARTNOTEFT_GEN_A_CORE
Attempted to place NG tube several times  Patient not tolerating  Continues to pull head away and grab at tube while it's being advanced.

## 2023-02-12 NOTE — CHART NOTE - NSCHARTNOTEFT_GEN_A_CORE
Patient with dx of SBO has remained without bowel function with worsening exam and KUB. Attempted to place NGT to decompress small bowel 3-4 x without success. Patient is unable to tolerate the tube passing >10cm- becomes agitated and rips the tube out. Patient was given lidocaine throat spray and it did not help at all.     Went back to bedside with Chief Surgery resident and other residents to explain the importance of this procedure. Explained the risks of not placing an NGT- bowel perforation, aspiration, sepsis. Also explained that NGT placement- if successful in bowel decompression- could avoid the need for a surgery at all. Also, If she still did require surgery, a decompressed bowel would make the surgery much easier.     Explained an edematous bowel could mean an increased chance of having an ileostomy/colostomy and could increase the risk of her abdomen being unable to be closed, staying intubated, etc.     She understands fully and still declined the placement of an NGT at this time. Explained to patient she may not put the head of her bed down to avoid risk of aspirating.     Discussed with Attending on call, Dr. Woodward.

## 2023-02-12 NOTE — DIETITIAN INITIAL EVALUATION ADULT - NAME AND PHONE
Intervention: 1.Meals and Snacks vs Nutrition Support Team 2.Medical Food Supplement  Monitor/Evaluate: Diet order, energy intake, nutrition focused physical findings, renal and anemia profile

## 2023-02-12 NOTE — DIETITIAN INITIAL EVALUATION ADULT - ORAL NUTRITION SUPPLEMENTS
1.Once medically feasible, recommend provide Prosource Gelatein TID (160kcal, 20gm protein, 4oz per carton)

## 2023-02-12 NOTE — DIETITIAN INITIAL EVALUATION ADULT - PERTINENT MEDS FT
MEDICATIONS  (STANDING):  amLODIPine   Tablet 5 milliGRAM(s) Oral every 24 hours  dextrose 5% + sodium chloride 0.45%. 1000 milliLiter(s) (125 mL/Hr) IV Continuous <Continuous>  heparin   Injectable 5000 Unit(s) SubCutaneous every 8 hours  lisinopril 20 milliGRAM(s) Oral daily  pantoprazole  Injectable 40 milliGRAM(s) IV Push every 24 hours  piperacillin/tazobactam IVPB.. 3.375 Gram(s) IV Intermittent every 8 hours  simvastatin 20 milliGRAM(s) Oral at bedtime    MEDICATIONS  (PRN):  acetaminophen     Tablet .. 650 milliGRAM(s) Oral every 6 hours PRN Temp greater or equal to 38C (100.4F), Mild Pain (1 - 3)  ondansetron Injectable 4 milliGRAM(s) IV Push every 8 hours PRN Nausea and/or Vomiting

## 2023-02-12 NOTE — DIETITIAN INITIAL EVALUATION ADULT - OTHER CALCULATIONS
Estimated Calorie Needs: [id number] x AF 1.1-1.9=9903-1860omow/day -Due to obesity  Estimated Protein Needs: 59-72grams/day (1.3-1.6grams/kg of IBW-45kg) -Due to age, obesity and possible SBO  Estimated Fluid Needs: 2053-2463mL/day (25-30mL/kg of admit weight) -Due to age, constipation and possible SBO

## 2023-02-13 NOTE — PROGRESS NOTE ADULT - ATTENDING COMMENTS
patient not passing gas or Bm yet, more distended. XR shows dilated bowel loops. patient is agreeing to surgery but still refuses NG. to OR today for xlap.

## 2023-02-13 NOTE — CONSULT NOTE ADULT - SUBJECTIVE AND OBJECTIVE BOX
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 She has had nausea and emesis which she described as bilious associated with the pain. She has had no bowel movements for the past three days but states that she has had flatus as soon as today.  She reports having diarrhea five days ago. Patient denied hematochezia, hematemesis, chest pain, cough change from chronic baseline, fever, sick contacts, nor urinary symptoms. (10 Feb 2023 02:21)    PAST MEDICAL & SURGICAL HISTORY  Atrial fibrillation    Hypertension    CVA (cerebrovascular accident)    HLD (hyperlipidemia)    GERD (gastroesophageal reflux disease)    History of cholecystectomy    History of hysterectomy    History of bladder suspension procedure    FAMILY HISTORY:  FAMILY HISTORY:    [ ] no pertinent family history of premature cardiovascular disease in first degree relatives.  Mother:   Father:   Siblings:     SOCIAL HISTORY:  []smoker  []Alcohol  []Drug    ALLERGIES:  No Known Allergies    MEDICATIONS:  MEDICATIONS  (STANDING):  benzocaine 20% Spray 1 Spray(s) Topical once  dextrose 5% + sodium chloride 0.45%. 1000 milliLiter(s) (125 mL/Hr) IV Continuous <Continuous>  heparin   Injectable 5000 Unit(s) SubCutaneous every 8 hours  labetalol Injectable 10 milliGRAM(s) IV Push every 6 hours  pantoprazole  Injectable 40 milliGRAM(s) IV Push every 24 hours  piperacillin/tazobactam IVPB.. 3.375 Gram(s) IV Intermittent every 8 hours    MEDICATIONS  (PRN):  ondansetron Injectable 4 milliGRAM(s) IV Push every 8 hours PRN Nausea and/or Vomiting      HOME MEDICATIONS:  Home Medications:      VITALS:   T(F): 96.2 (02-13 @ 16:00), Max: 98 (02-11 @ 20:00)  HR: 85 (02-13 @ 16:00) (62 - 93)  BP: 158/74 (02-13 @ 16:00) (124/59 - 174/72)  BP(mean): 107 (02-13 @ 16:00) (91 - 118)  RR: 36 (02-13 @ 16:00) (18 - 36)  SpO2: 94% (02-13 @ 16:00) (92% - 99%)    I&O's Summary    12 Feb 2023 07:01  -  13 Feb 2023 07:00  --------------------------------------------------------  IN: 3325 mL / OUT: 0 mL / NET: 3325 mL    13 Feb 2023 07:01  -  13 Feb 2023 19:49  --------------------------------------------------------  IN: 1375 mL / OUT: 350 mL / NET: 1025 mL        REVIEW OF SYSTEMS:    Negative except as mentioned in HPI    PHYSICAL EXAM:  NEURO: Awake , alert and oriented  GEN: Not in acute distress  NECK: No JVD  LUNGS: Clear to auscultation bilaterally   CARDIOVASCULAR: S1/S2 present, RRR , no murmurs or rubs, no carotid bruits,  + PP bilaterally  ABD: Soft  EXT: No RUIZ  SKIN: Intact    LABS:                        10.6   11.65 )-----------( 387      ( 12 Feb 2023 23:59 )             33.3     02-12    138  |  104  |  10  ----------------------------<  146<H>  3.8   |  21  |  1.0    Ca    8.8      12 Feb 2023 23:59  Phos  2.9     02-12  Mg     2.0     02-12    RADIOLOGY:  -CXR:  < from: Xray Chest 2 Views PA/Lat (02.09.23 @ 22:24) >  Impression:    No radiographic evidence of acute pulmonary disease.    < end of copied text >    -TTE:    -CCTA:  < from: CT Abdomen and Pelvis w/ IV Cont (02.10.23 @ 00:04) >  IMPRESSION:    Pneumatosis intestinalis of the cecum and ascending colon.    Fluid-filled distended small bowel loops with a transition to normal  bowel along the ileum in the mid pelvis concerning for small bowel   obstruction. No pneumoperitoneum.    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 She has had nausea and emesis which she described as bilious associated with the pain. She has had no bowel movements for the past three days but states that she has had flatus as soon as today.  She reports having diarrhea five days ago. Patient denied hematochezia, hematemesis, chest pain, cough change from chronic baseline, fever, sick contacts, nor urinary symptoms. (10 Feb 2023 02:21)    PAST MEDICAL & SURGICAL HISTORY  Atrial fibrillation    Hypertension    CVA (cerebrovascular accident)    HLD (hyperlipidemia)    GERD (gastroesophageal reflux disease)    History of cholecystectomy    History of hysterectomy    History of bladder suspension procedure    FAMILY HISTORY:  FAMILY HISTORY:    [ ] no pertinent family history of premature cardiovascular disease in first degree relatives.  Mother:   Father:   Siblings:     SOCIAL HISTORY:  []smoker  []Alcohol  []Drug  None    ALLERGIES:  No Known Allergies    MEDICATIONS:  MEDICATIONS  (STANDING):  benzocaine 20% Spray 1 Spray(s) Topical once  dextrose 5% + sodium chloride 0.45%. 1000 milliLiter(s) (125 mL/Hr) IV Continuous <Continuous>  heparin   Injectable 5000 Unit(s) SubCutaneous every 8 hours  labetalol Injectable 10 milliGRAM(s) IV Push every 6 hours  pantoprazole  Injectable 40 milliGRAM(s) IV Push every 24 hours  piperacillin/tazobactam IVPB.. 3.375 Gram(s) IV Intermittent every 8 hours    MEDICATIONS  (PRN):  ondansetron Injectable 4 milliGRAM(s) IV Push every 8 hours PRN Nausea and/or Vomiting      HOME MEDICATIONS:  Home Medications:      VITALS:   T(F): 96.2 (02-13 @ 16:00), Max: 98 (02-11 @ 20:00)  HR: 85 (02-13 @ 16:00) (62 - 93)  BP: 158/74 (02-13 @ 16:00) (124/59 - 174/72)  BP(mean): 107 (02-13 @ 16:00) (91 - 118)  RR: 36 (02-13 @ 16:00) (18 - 36)  SpO2: 94% (02-13 @ 16:00) (92% - 99%)    I&O's Summary    12 Feb 2023 07:01  -  13 Feb 2023 07:00  --------------------------------------------------------  IN: 3325 mL / OUT: 0 mL / NET: 3325 mL    13 Feb 2023 07:01  -  13 Feb 2023 19:49  --------------------------------------------------------  IN: 1375 mL / OUT: 350 mL / NET: 1025 mL        REVIEW OF SYSTEMS:    10 point ROS completed; negative except as stated in HPI    PHYSICAL EXAM:  NEURO: Awake , alert and oriented  GEN: Not in acute distress  NECK: No JVD  LUNGS: Clear to auscultation bilaterally   CARDIOVASCULAR: S1/S2 present, RRR , no murmurs or rubs, no carotid bruits,  + PP bilaterally  ABD: Decreased bowel sounds, distended  EXT: No RUIZ  SKIN: Intact, no rash    LABS:                        10.6   11.65 )-----------( 387      ( 12 Feb 2023 23:59 )             33.3     02-12    138  |  104  |  10  ----------------------------<  146<H>  3.8   |  21  |  1.0    Ca    8.8      12 Feb 2023 23:59  Phos  2.9     02-12  Mg     2.0     02-12    RADIOLOGY:  -CXR:  < from: Xray Chest 2 Views PA/Lat (02.09.23 @ 22:24) >  Impression:    No radiographic evidence of acute pulmonary disease.    < end of copied text >    -TTE:    -CCTA:  < from: CT Abdomen and Pelvis w/ IV Cont (02.10.23 @ 00:04) >  IMPRESSION:    Pneumatosis intestinalis of the cecum and ascending colon.    Fluid-filled distended small bowel loops with a transition to normal  bowel along the ileum in the mid pelvis concerning for small bowel   obstruction. No pneumoperitoneum.    < end of copied text >    -STRESS TEST:    -CATHETERIZATION:    ECG:  < from: 12 Lead ECG (02.12.23 @ 10:49) >  Ventricular Rate 88 BPM    Atrial Rate 88 BPM    P-R Interval 170 ms    QRS Duration 78 ms    Q-T Interval 362 ms    QTC Calculation(Bazett) 438 ms    P Axis 44 degrees    R Axis -33 degrees    T Axis 17 degrees    Diagnosis Line Normal sinus rhythm  Left axis deviation  Low voltage QRS  Inferior infarct , age undetermined  Anteroseptal infarct , age undetermined  Abnormal ECG    < end of copied text >      TELEMETRY EVENTS: sinus rhythm

## 2023-02-13 NOTE — CONSULT NOTE ADULT - SUBJECTIVE AND OBJECTIVE BOX
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 She has had nausea and emesis which she described as bilious associated with the pain. She has had no bowel movements for the past three days but states that she has had flatus as soon as today.  She reports having diarrhea five days ago. Patient denied hematochezia, hematemesis, chest pain, cough change from chronic baseline, fever, sick contacts, nor urinary symptoms.     Review of Systems:  Other Review of Systems: All other review of systems negative, except as noted in HPI      Allergies and Intolerances:        Allergies:  	No Known Allergies:     Patient History:    Past Medical, Past Surgical, and Family History:  PAST MEDICAL HISTORY:    Chronic Atrial fibrillation     CVA (cerebrovascular accident)     GERD (gastroesophageal reflux disease)     HLD (hyperlipidemia)     Hypertension.     PAST SURGICAL HISTORY:  History of bladder suspension procedure     History of cholecystectomy     History of hysterectomy.     Social History:  · Substance use	No      Planned Procedure__exploratory laparotomy vs. laparoscopic/robotic colon resection_____________________________________    Does the patient have the following conditions? Type Y or N    __n__DVT/PE           	  Currently anticoagulated ______ IVC Filter _______ Date:______    _n___DM                             Controlled    Y _______ N _______    _y___HTN	                              Controlled    Y _______ N _______    _n___CAD	                                  Prior MI  _____CABG _____STENT  ______ EF _____    __n__CHF                             Chronic _____ Acute _____ EF ______    _y___Afib	                                  Current Rhythm _nsr________   Current anticoagulation _________    __n__PPM/ICD                         Indication ___________  last Interrogated _________    _n___OSA	                              PAP  Type &Settings _____________    _n___Asthma/COPD	          Last Exac _______ Last Steroid use Date _______     _n___O2 dependent	          Reason ______________    _n___CKD or GERA	                  Stable Y _____  N ______    _n___Electrolyte Abn	          Type ___________     Stable Y ______   N _______    _n___Cirrhosis	                  Cause __________     Stable Y ______   N _______    _n___GI Bleed                          Cause __________     Stable Y ______   N _______    __y__Anemia	                          Cause _ivf dilution_________     Stable Y ______   N _______    __n__Transfusion                  Last date ________      _n___ETOH Use	                  Last date________     Intervention __________________________    _n___Tobacco Abuse	          Last date ________    Intervention __________________________    _n___Drug Use	                 Type____________  Last dose _____ Intervention______________    ____Other                             Details_________________________________________________    Allergies    No Known Allergies      MEDICATIONS  (STANDING):  benzocaine 20% Spray 1 Spray(s) Topical once  dextrose 5% + sodium chloride 0.45%. 1000 milliLiter(s) (125 mL/Hr) IV Continuous <Continuous>  heparin   Injectable 5000 Unit(s) SubCutaneous every 8 hours  labetalol Injectable 10 milliGRAM(s) IV Push every 6 hours  pantoprazole  Injectable 40 milliGRAM(s) IV Push every 24 hours  piperacillin/tazobactam IVPB.. 3.375 Gram(s) IV Intermittent every 8 hours    MEDICATIONS  (PRN):  ondansetron Injectable 4 milliGRAM(s) IV Push every 8 hours PRN Nausea and/or Vomiting      Lopez Activity Status Index: Can the patient climb a flight of stairs or walk uphill?   ______ N   <4 METS   _x______ Y > 4 METS-lives in townhouse with stairs, does household chores including laundry and carrying loads up stairs    Physical Exam:  Vital Signs Last 24 Hrs  T(C): 36.2 (13 Feb 2023 08:00), Max: 36.4 (12 Feb 2023 16:00)  T(F): 97.2 (13 Feb 2023 08:00), Max: 97.6 (12 Feb 2023 16:00)  HR: 80 (13 Feb 2023 08:00) (80 - 90)  BP: 161/74 (13 Feb 2023 08:00) (145/71 - 171/72)  BP(mean): 106 (13 Feb 2023 08:00) (102 - 107)  RR: 18 (13 Feb 2023 08:00) (18 - 18)  SpO2: 94% (13 Feb 2023 08:00) (92% - 94%)    Parameters below as of 13 Feb 2023 08:00  Patient On (Oxygen Delivery Method): room air        HEENT: EOMI    Chest: CTA B/L    cv: RRR S1S2    Abd: DECREASED BS, DISTENDED, TENDER TO SUPERFICIAL PALPATION GENERALLY BUT NO GUARDING    Neuro: A X O X 3, 5/5 B/L    LABS AND OTHER PERTINENT TESTS:                          10.6   11.65 )-----------( 387      ( 12 Feb 2023 23:59 )             33.3     02-12    138  |  104  |  10  ----------------------------<  146<H>  3.8   |  21  |  1.0    Ca    8.8      12 Feb 2023 23:59  Phos  2.9     02-12  Mg     2.0     02-12      Culture - Urine (collected 02-10-23 @ 10:20)  Source: Clean Catch Clean Catch (Midstream)  Final Report (02-12-23 @ 00:46):    <10,000 CFU/mL Normal Urogenital Latricia    cxr: napd  abd xray: distended bowel  ekg: nsr, old infarcts no acute ischemia/arrythmia (int by me)        Risk Assessment: (Use One)    [employer] NSQIP Surgical Risk Calculator http://riskcalculator.facs.org------------------BELOW AVERAGE RISK  Revised Cardiac Risk Index       ___x_____  The patient is optimized for surgery/procedure and may proceed to the operating room as scheduled    _________The patient is NOT optimized for surgery/procedure and should not proceed to the operating room as scheduled      Recommendations for optimization:    NONE, NO FURTHER TESTING          Anticoagulation Recommendations: PER SURGERY PROTOCOL

## 2023-02-13 NOTE — PROGRESS NOTE ADULT - SUBJECTIVE AND OBJECTIVE BOX
SURGERY PROGRESS NOTE     Patient: RUBIO HUGHES , 82y ([birth year])Female   MRN: [id number]  Location: [address]  Visit: 02-10-23 Inpatient  Date: 02-13-23 @ 00:09       Events of past 24 hours:  Patient seen resting in bed. Patient refusing NG tube (details in chart note). Abdomen distended and tense. Nontender. Denies nausea/vomiting.    PAST MEDICAL & SURGICAL HISTORY:  Atrial fibrillation      Hypertension      CVA (cerebrovascular accident)      HLD (hyperlipidemia)      GERD (gastroesophageal reflux disease)      History of cholecystectomy      History of hysterectomy      History of bladder suspension procedure           Vitals:   T(F): 96.5 (02-12-23 @ 23:45), Max: 97.6 (02-12-23 @ 16:00)  HR: 87 (02-12-23 @ 20:00)  BP: 156/74 (02-12-23 @ 20:00)  RR: 18 (02-12-23 @ 16:00)  SpO2: 92% (02-12-23 @ 20:00)      Diet, NPO:   Except Medications      Fluids:     I & O's:    02-11-23 @ 07:01  -  02-12-23 @ 07:00  --------------------------------------------------------  IN:    dextrose 5% + sodium chloride 0.45%: 1250 mL    IV PiggyBack: 200 mL  Total IN: 1450 mL    OUT:  Total OUT: 0 mL    Total NET: 1450 mL       PHYSICAL EXAM:   General: NAD, AAOx3, calm and cooperative  Cardiac: RRR S1, S2  Respiratory: CTAB, normal respiratory effort  Abdomen: Tense, distended, non-tender, no rebound, no guarding. +BS.  Vascular: Pulses 2+ throughout, extremities well perfused  Skin: Warm/dry, normal color, no jaundice    MEDICATIONS  (STANDING):  amLODIPine   Tablet 5 milliGRAM(s) Oral every 24 hours  benzocaine 20% Spray 1 Spray(s) Topical once  dextrose 5% + sodium chloride 0.45%. 1000 milliLiter(s) (125 mL/Hr) IV Continuous <Continuous>  diphenhydrAMINE Injectable 25 milliGRAM(s) IV Push once  heparin   Injectable 5000 Unit(s) SubCutaneous every 8 hours  lisinopril 20 milliGRAM(s) Oral daily  pantoprazole  Injectable 40 milliGRAM(s) IV Push every 24 hours  piperacillin/tazobactam IVPB.. 3.375 Gram(s) IV Intermittent every 8 hours  simvastatin 20 milliGRAM(s) Oral at bedtime    MEDICATIONS  (PRN):  acetaminophen     Tablet .. 650 milliGRAM(s) Oral every 6 hours PRN Temp greater or equal to 38C (100.4F), Mild Pain (1 - 3)  ondansetron Injectable 4 milliGRAM(s) IV Push every 8 hours PRN Nausea and/or Vomiting      DVT PROPHYLAXIS: heparin   Injectable 5000 Unit(s) SubCutaneous every 8 hours    GI PROPHYLAXIS: pantoprazole  Injectable 40 milliGRAM(s) IV Push every 24 hours    ANTICOAGULATION:   ANTIBIOTICS:  piperacillin/tazobactam IVPB.. 3.375 Gram(s)            LAB/STUDIES:  Labs:  CAPILLARY BLOOD GLUCOSE                              10.5   10.53 )-----------( 338      ( 12 Feb 2023 01:32 )             33.2       Auto Neutrophil %: 77.7 % (02-12-23 @ 01:32)  Auto Immature Granulocyte %: 0.3 % (02-12-23 @ 01:32)    02-12    139  |  107  |  18  ----------------------------<  137<H>  3.9   |  20  |  1.0      Calcium, Total Serum: 8.6 mg/dL (02-12-23 @ 01:32)      LFTs:         Coags:        Serum Pro-Brain Natriuretic Peptide: 950 pg/mL (02-09-23 @ 22:16)          Culture - Urine (collected 10 Feb 2023 10:20)  Source: Clean Catch Clean Catch (Midstream)  Final Report (12 Feb 2023 00:46):    <10,000 CFU/mL Normal Urogenital Latricia

## 2023-02-13 NOTE — CONSULT NOTE ADULT - ASSESSMENT
* Patient-based characteristics (Functional capacity)  Patient is able to achieve more than 4 MET (walk 4 blocks, climb 2 flights of stairs, etc...)          Y [X] / N []    High-risk patient features:  - Recent (<30 days) or active MI          Y [] / N [X]  - Unstable or severe angina          Y [] / N [X]  - Decompensated heart failure, or worsening or new-onset heart failure          Y [] / N [X]  - Severe valvular disease          Y [] / N [X]  - Significant arrhythmia (Tachy- or Bradyarrhythmia)          Y [] / N [X]    * Surgery/Procedure-based characteristics (Type of surgery)  - Low-risk procedure (outpatient procedure, elective, endoscopy, etc...)          Y [] / N []  - Elevated or Moderate-risk procedure (Inpatient)          Y [] / N []  - High-risk procedure (urgent/emergent procedure, Intrathoracic, vascular, etc...)          Y [x] / N []    * Revised Cardiac Risk Index (RCRI)  1- History of ischemic heart disease          Y [] / N [X]  2- History of congestive heart failure          Y [] / N [X]  3- History of stroke/TIA          Y [x] / N []  4- History of insulin-dependent diabetes          Y [] / N [X]  5- Chronic kidney disease (Cr >2mg/dL)          Y [] / N [X]  6- Undergoing suprainguinal vascular, intraperitoneal, or intrathoracic surgery          Y [x] / N []    Class III risk (Two factors) --> 10.1% risk (30-day risk of death, MI, or cardiac arrest)    * IMPRESSION & RECOMMENDATIONS:  -Moderate-risk patient for a High (>7%) -risk surgery/procedure  - This consult serves only as a shantelle-operative cardiac risk stratification and evaluation to predict 30-days cardiac complications risk and mortality. The decision to proceed with the surgery/procedure is made by the performing physician and the patient  * Patient-based characteristics (Functional capacity)  Patient is able to achieve more than 4 MET (walk 4 blocks, climb 2 flights of stairs, etc...)          Y [] / N [X]    High-risk patient features:  - Recent (<30 days) or active MI          Y [] / N [X]  - Unstable or severe angina          Y [] / N [X]  - Decompensated heart failure, or worsening or new-onset heart failure          Y [] / N [X]  - Severe valvular disease          Y [] / N [X]  - Significant arrhythmia (Tachy- or Bradyarrhythmia)          Y [] / N [X]    * Surgery/Procedure-based characteristics (Type of surgery)  - Low-risk procedure (outpatient procedure, elective, endoscopy, etc...)          Y [] / N []  - Elevated or Moderate-risk procedure (Inpatient)          Y [] / N []  - High-risk procedure (urgent/emergent procedure, Intrathoracic, vascular, etc...)          Y [x] / N []    * Revised Cardiac Risk Index (RCRI)  1- History of ischemic heart disease          Y [] / N [X]  2- History of congestive heart failure          Y [] / N [X]  3- History of stroke/TIA          Y [x] / N []  4- History of insulin-dependent diabetes          Y [] / N [X]  5- Chronic kidney disease (Cr >2mg/dL)          Y [] / N [X]  6- Undergoing suprainguinal vascular, intraperitoneal, or intrathoracic surgery          Y [x] / N []    Class III risk (Two factors) --> 10.1% risk (30-day risk of death, MI, or cardiac arrest)    * IMPRESSION & RECOMMENDATIONS:  -Moderate-risk patient for a High (>7%) -risk surgery/procedure  - This consult serves only as a shantelle-operative cardiac risk stratification and evaluation to predict 30-days cardiac complications risk and mortality. The decision to proceed with the surgery/procedure is made by the performing physician and the patient

## 2023-02-13 NOTE — CHART NOTE - NSCHARTNOTEFT_GEN_A_CORE
Patient still distended this morning, passing minimal gas throughout weekend. Patient also with nausea. Unable to place NGT yesterday. I stressed the important of an NGT at this time to the patient this morning, and she is still refusing placement. She would not even let me try, and reports that she "would rather have a surgery."   Ordered an obstructive series, made strict NPO, and converted BP medication to IV.  Will discuss case with attending.   Of note, she is distended but having no abdominal pain or tenderness at this time.

## 2023-02-13 NOTE — CONSULT NOTE ADULT - ATTENDING COMMENTS
Briefly, consult called for preoperative cardiovascular evaluation, however, patient surgery completed before evaluation complete.

## 2023-02-13 NOTE — PROGRESS NOTE ADULT - ASSESSMENT
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 Fluid-filled distended SB loops w/ transition to normal bowel along ileum in mid pelvis concerning for SBO. No pneumoperitoneum.    Plan:  - Serial abdominal exams  - Pain control  - Attempts at placing NGT have failed and patient refusing more attempts  - IVF  - Bowel rest  - Patient now day 4 w/ no bowel movements or PO nutrition  - Will need nutritional consult if diet not advanced      [phone]

## 2023-02-14 NOTE — BRIEF OPERATIVE NOTE - OPERATION/FINDINGS
Impressively distended small bowel and large bowel, cecum under significant tension. Approximately 3L serosanguineous fluid drained upon entry. Entirety of colon examined with discovery of midsigmoid colon mass. Sigmoid colectomy performed with creation of descending colostomy. Enteric content and stool milked from colon into intentional enterotomy in cecum whereby it was drained. This was additionally performed to decompress the small bowel with a total of approximately 3L enteric content and stool drained. Enterotomy closed with TA 90 stapler. Given significant small bowel distention at initiation of case and fluid resuscitation, decision made to defer closure. Abdomen washed out with approx 6L crystalloid.

## 2023-02-14 NOTE — BRIEF OPERATIVE NOTE - COMMENTS
- colostomy patent and healthy at termination of case  - temporary closure d/t risk of small bowel distention, abdomen under tension, recommend RTOR when has ostomy function or distention improves in next 48h  - was likely fluid behind prior to OR between NGT output of 1200mL, 3L free fluid and approx 3L drained from bowel  - note that cecal enterotomy repaired w/ TA, cecum appeared patent at termination of case  - given cecal enterotomy, some fecal spillage in abdomen during drainage, wound irrigated with 6L crystalloid - colostomy patent and healthy at termination of case  - temporary closure d/t risk of small bowel distention, abdomen under tension, recommend RTOR when has ostomy function or distention improves in next 48h  - was likely fluid behind prior to OR between NGT output of 1200mL, 3L free fluid and approx 3L drained from bowel  - note that cecal enterotomy repaired w/ TA, cecum appeared patent at termination of case  - given cecal enterotomy, some fecal spillage in abdomen during drainage, wound irrigated with 6L crystalloid  Operative dictation#: [id number]

## 2023-02-14 NOTE — CONSULT NOTE ADULT - ASSESSMENT
Assessment & Plan    82y Female 4d POD#1 s/p Sigmoid colectomy with ostomy, sigmoid mass resection for SBO/LBO. Plan for RTOR Thursday      NEURO:  #Acute pain    -APAP prn, Gabapentin 100mg q8    -if intubated Fentanyl 12.5 mcg q4 prn    -Inpatient Rx: HYDROmorphone  Injectable 0.5 milliGRAM(s) IV Push every 10 minutes PRN  ondansetron Injectable 4 milliGRAM(s) IV Push every 6 hours PRN  propofol Infusion 20 MICROgram(s)/kG/Min IV Continuous <Continuous>      RESP:   #Oxygenation    -Intubated,  RR 10 FiO2 50% PEEP 5  -Daily CXR while intubated    CARDS:   #  Imaging:   Labs:   Rx-labetalol Injectable 10 every 6 hours  phenylephrine    Infusion 0.5 <Continuous>      GI/NUTR:   #s/p Sigmoid colectomy with descending ostomy and resection of sigmoid mass, 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  - f/u pathology  - 3L suctioned out of small bowel, 1.2L removed from NGT intraop  - s/p 6L IVF  - RTOR 2/16 tentatively  - Abthera at 150mmHg per primary team    #Diet, NPO, NGT in place to suction  Diet, NPO (02-13-23 @ 09:46) [Active]    -aspiration precautions, HOB 30  #GI Prophylaxis    -Pantoprazole  #Bowel regimen    -senna/miralax    -last bowel movement pending     /RENAL:   #urine output in crtically ill  #urine output in critically ill    -indwelling rodriguez (placed 2/14/2023    Labs:          BUN/Cr- 10/1.0  -->          Electrolytes-Na 138 // K 3.8 // Mg 2.0 //  Phos 2.9 (02-12 @ 23:59)    #maintain euvolemia  -s/p 6L IVF intraoperatively        02-12-23 @ 07:01  -  02-13-23 @ 07:00  --------------------------------------------------------  IN: 3325 mL / OUT: 0 mL / NET: 3325 mL    02-13-23 @ 07:01 - 02-14-23 @ 06:03  --------------------------------------------------------  IN: 1375 mL / OUT: 350 mL / NET: 1025 mL        HEME/ONC:   #DVT prophylaxis    -heparin   Injectable  , SCDs    Labs: Hb/Hct:  10.5/33.2  -->,  10.6/33.3  -->                      Plts:  338  -->,  387  -->                 PTT/INR:  30.4/0.95  --->     T&S Expires: 2/16    ID:  WBC- 10.38  --->>,  10.53  --->>,  11.65  --->>  Antibiotics- antibiotics-piperacillin/tazobactam IVPB.. 3.375 every 8 hours  Temp trend- 24hrs T(F): 98.6 (02-14 @ 00:55), Max: 98.6 (02-14 @ 00:55)    ENDO:    -FSG q6 if NPO or Tube feeds    -Glucose goal 140-180    -if above 180 start ISS     HA1C     MSK:    LINES/DRAINS:  PIV, Rodriguez, Radial A line    ADVANCED DIRECTIVES:  Full Code    HCP/Emergency Contact-    INDICATION FOR SICU: HEMODYNAMIC MONITORING POSTOPERATIVE REQUIRING VASOPRESSORS AND MECHANICAL VENTILATION              DISPO:   Case discussed with attending Dr. Padilla

## 2023-02-14 NOTE — PROGRESS NOTE ADULT - SUBJECTIVE AND OBJECTIVE BOX
GENERAL SURGERY PROGRESS NOTE    Admission: Other specified disorders of intestines    Hospital Day: 6    24 Hour Events:  Patient's abdomen remains distended.  Patient reports nausea with several bouts of emesis refractory to zofran.   Patient pre-opped for OR procedure     Vitals:  T(F): 98.6 (02-14-23 @ 00:55), Max: 98.6 (02-14-23 @ 00:55)  HR: 81 (02-14-23 @ 00:55)  BP: 175/84 (02-14-23 @ 00:55)  RR: 21 (02-14-23 @ 00:00)  SpO2: 96% (02-14-23 @ 00:55)    Diet, NPO    Fluids:     I & O's:    02-12-23 @ 07:01  -  02-13-23 @ 07:00  --------------------------------------------------------  IN:    dextrose 5% + sodium chloride 0.45%: 3000 mL    IV PiggyBack: 200 mL    IV PiggyBack: 125 mL  Total IN: 3325 mL    OUT:  Total OUT: 0 mL    Total NET: 3325 mL    PHYSICAL EXAM:  General: NAD  Cardiac: RRR  Respiratory: unlabored breathing at rest  Abdomen: Soft, moderately distended, non-tender, no rebound, no guarding.  Musculoskeletal: FROM in b/l UE and LE  Neuro: Sensation grossly intact and equal throughout, no focal deficits  Skin: Warm/dry, normal color, no jaundice    MEDICATIONS  (STANDING):  benzocaine 20% Spray 1 Spray(s) Topical once  dextrose 5% + sodium chloride 0.45%. 1000 milliLiter(s) (125 mL/Hr) IV Continuous <Continuous>  heparin   Injectable 5000 Unit(s) SubCutaneous every 8 hours  labetalol Injectable 10 milliGRAM(s) IV Push every 6 hours  pantoprazole  Injectable 40 milliGRAM(s) IV Push every 24 hours  piperacillin/tazobactam IVPB.. 3.375 Gram(s) IV Intermittent every 8 hours    MEDICATIONS  (PRN):  ondansetron Injectable 4 milliGRAM(s) IV Push every 6 hours PRN Nausea and/or Vomiting    DVT PROPHYLAXIS: heparin   Injectable 5000 Unit(s) SubCutaneous every 8 hours    GI PROPHYLAXIS: pantoprazole  Injectable 40 milliGRAM(s) IV Push every 24 hours    ANTICOAGULATION:   ANTIBIOTICS:  piperacillin/tazobactam IVPB.. 3.375 Gram(s)    LAB/STUDIES:  Labs:  CAPILLARY BLOOD GLUCOSE                        10.6   11.65 )-----------( 387      ( 12 Feb 2023 23:59 )             33.3       02-12    138  |  104  |  10  ----------------------------<  146<H>  3.8   |  21  |  1.0    LFTs:    Coags:     10.80  ----< 0.95    ( 14 Feb 2023 00:37 )     30.4      Serum Pro-Brain Natriuretic Peptide: 950 pg/mL (02-09-23 @ 22:16)

## 2023-02-14 NOTE — CONSULT NOTE ADULT - SUBJECTIVE AND OBJECTIVE BOX
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 She has had nausea and emesis which she described as bilious associated with the pain. She has had no bowel movements for the past three days but states that she has had flatus as soon as today.  She reports having diarrhea five days ago. Patient denied hematochezia, hematemesis, chest pain, cough change from chronic baseline, fever, sick contacts, nor urinary symptoms. (10 Feb 2023 02:21)          Procedure:  OR Stats  OR Time: 3 hours          EBL: 25cc         IV Fluids: 6000cc       Blood Products: none               UOP:    150cc  Findings - Mid sigmoid mass s/p resection, distended          24 Hour Events  -Admission under SICU service      [X] A ten-point review of systems was otherwise negative except as noted above.  [  ] Due to altered mental status/intubation, subjective information was not attained from the patient. History was obtained, to the extent possible, from review of the chart and collateral sources of information.    =========================================================================================================================================      PMH  PAST MEDICAL & SURGICAL HISTORY:  Atrial fibrillation  Hypertension  CVA (cerebrovascular accident)  HLD (hyperlipidemia)  GERD (gastroesophageal reflux disease)  History of cholecystectomy  History of hysterectomy  History of bladder suspension procedure    Home Meds:  Home Medications:     Allergies  Allergies  No Known Allergies    Intolerances       Current Medications:  benzocaine 20% Spray 1 Spray(s) Topical once  chlorhexidine 0.12% Liquid 15 milliLiter(s) Oral Mucosa every 12 hours  dextrose 5% + sodium chloride 0.45%. 1000 milliLiter(s) (125 mL/Hr) IV Continuous <Continuous>  heparin   Injectable 5000 Unit(s) SubCutaneous every 8 hours  HYDROmorphone  Injectable 0.5 milliGRAM(s) IV Push every 10 minutes PRN Moderate Pain (4 - 6)  labetalol Injectable 10 milliGRAM(s) IV Push every 6 hours  lactated ringers. 1000 milliLiter(s) (75 mL/Hr) IV Continuous <Continuous>  ondansetron Injectable 4 milliGRAM(s) IV Push every 6 hours PRN Nausea and/or Vomiting  pantoprazole  Injectable 40 milliGRAM(s) IV Push every 24 hours  phenylephrine    Infusion 0.5 MICROgram(s)/kG/Min (15.4 mL/Hr) IV Continuous <Continuous>  piperacillin/tazobactam IVPB.. 3.375 Gram(s) IV Intermittent every 8 hours  propofol Infusion 20 MICROgram(s)/kG/Min (9.85 mL/Hr) IV Continuous <Continuous>      VITAL SIGNS, INS/OUTS (Last 24Hours)  ICU Vital Signs Last 24 Hrs  T(C): 37 (14 Feb 2023 00:55), Max: 37 (14 Feb 2023 00:55)  T(F): 98.6 (14 Feb 2023 00:55), Max: 98.6 (14 Feb 2023 00:55)  HR: 81 (14 Feb 2023 00:55) (76 - 89)  BP: 175/84 (14 Feb 2023 00:55) (136/63 - 177/77)  BP(mean): 120 (14 Feb 2023 00:55) (91 - 120)  ABP: --  ABP(mean): --  RR: 21 (14 Feb 2023 00:00) (18 - 38)  SpO2: 99% (14 Feb 2023 05:30) (92% - 99%)    O2 Parameters below as of 14 Feb 2023 00:55  Patient On (Oxygen Delivery Method): Mechanical Ventilation , RR 10 50% FiO2          I&O's Summary    12 Feb 2023 07:01  -  13 Feb 2023 07:00  --------------------------------------------------------  IN: 3325 mL / OUT: 0 mL / NET: 3325 mL    13 Feb 2023 07:01  -  14 Feb 2023 05:59  --------------------------------------------------------  IN: 1375 mL / OUT: 350 mL / NET: 1025 mL        Physical Exam:   ----------------------------------------------------------------------------------------------------------  GCS:      Exam: Intubated, sedated    RESPIRATORY:  Mechanical Ventilation, equal breath sounds    CARDIOVASCULAR:   S1/S2.  RRR  No peripheral edema    GASTROINTESTINAL:  Abdomen soft, wound vac in place,    MUSCULOSKELETAL:  Extremities warm, pink, well-perfused.    DERM:  No skin breakdown     :   Exam: Donis catheter in place.     Height (cm): 152.4 (02-13-23)  Weight (kg): 82.1 (02-14-23)  BMI (kg/m2): 35.3 (02-14-23)  BSA (m2): 1.79 (02-14-23)    Tubes/Lines/Drains   ----------------------------------------------------------------------------------------------------------  [x] Peripheral IV  [] Central Venous Line          IJ/Femoral             Date Placed:    [X] Arterial Line		      Radial Date Placed: 2/14  [] PICC:         	  [] Midline		                                  Date Placed:   [X] Urinary Catheter Donis                                             Date Placed: 2/14

## 2023-02-14 NOTE — PROGRESS NOTE ADULT - ASSESSMENT
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 Fluid-filled distended SB loops w/ transition to normal bowel along ileum in mid pelvis concerning for SBO. No pneumoperitoneum.    Plan:  #Small Bowel Obstruction   - Patient pre-opped for OR procedure (exploratory laparotomy with abdominal exploration)   - F/u OR findings   - Serial abdominal exams   - Pain control   - IVF   - Bowel rest    #Other  Diet: NPO with IVF  GI ppx: none  DVT ppx: SQH 5000 q8  Dispo: pending    SPECTRA [id number]

## 2023-02-14 NOTE — PROGRESS NOTE ADULT - ATTENDING COMMENTS
on mechanical ventilation. 50% O2 and 5 peep. wean as toelrated. follow lactic acid level, may need fluids boluses for resuscitation. GI- npo. Renal- mrginal urine output 20cc/hr. cr 1.1. Sedation and pain control as needed. on zosyn. Hemodynamically on marni, wean. remains critical. will need return to OR in 48 hours.

## 2023-02-14 NOTE — CHART NOTE - NSCHARTNOTEFT_GEN_A_CORE
PACU ANESTHESIA ADMISSION NOTE      Procedure:   Post op diagnosis:      __x__  Intubated  TV:_450_____       Rate: _10_____      FiO2: __50____    ____  Patent Airway    ____  Full return of protective reflexes    ____  Full recovery from anesthesia / back to baseline status    Vitals  HR: 80  BP: 107/50  RR: 10  O2 Sat: 100%  Temp: 97.6    Mental Status:  ____ Awake   _____ Alert   _____ Drowsy   __x___ Sedated    Nausea/Vomiting:  __x__ NO  ______Yes,   See Post - Op Orders          Pain Scale (0-10):  _____    Treatment: ____ None    __x__ See Post - Op/PCA Orders    Post - Operative Fluids:   ____ Oral   __x__ See Post - Op Orders    Plan: Discharge when criteria met:   ____Home       _____Floor     _x____Critical Care   Other:_________________    Comments: Patient had smooth intraoperative event, no anesthesia complication. Sign out given to SICU team

## 2023-02-15 NOTE — PROGRESS NOTE ADULT - ASSESSMENT
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    Plan:  #Small Bowel Obstruction   - RTOR in 24 hours   - Care per SICU   - wean vent/pressors as tolerated   - Ostomy w/ flatus and stool   - f/u NG tube output   - f/u abthera output   - f/u urine output    #Other  Diet: NPO with IVF  GI ppx: pantoprazole  DVT ppx: SQH 5000 q8  Dispo: pending    Trauma/ACS  SPECTRA [id number]

## 2023-02-15 NOTE — PROGRESS NOTE ADULT - ASSESSMENT
Assessment & Plan    82y Female 4d POD#1 s/p Sigmoid colectomy with ostomy, sigmoid mass resection for SBO/LBO. Plan for RTOR Thursday      NEURO:  #Acute pain    -APAP IV prn    -iFentanyl 25 mcg prn    -Precedex infusion        RESP:   #Oxygenation    -Intubated,  RR 10 FiO2 40% PEEP 5     ABG     ET tube lip line 21  -Daily CXR while intubated    CARDS:   #hypotension 2/2 hypovolemic shock  -switched marni to levophed   -NICOM positive- during the 1.5 liters IVF, OV 1L bolus   -Albumen 5%- 250 ml q6h x24h  #Imaging/labs  - obtain 2 D echo  -trop neg x 3      GI/NUTR:   #s/p Sigmoid colectomy with descending ostomy and resection of sigmoid mass, 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  - f/u pathology  - 3L suctioned out of small bowel, 1.2L removed from NGT intraop  - s/p 6L IVF  - RTOR 2/16 tentatively  - Abthera at 150mmHg per primary team    #Diet, NPO, NGT in place to suction    -aspiration precautions, HOB 30  #GI Prophylaxis    -Pantoprazole  #Bowel regimen    -last bowel movement pending     /RENAL:   #urine output in crtically ill    -indwelling rodriguez (placed 2/14/2023    -low u/o, bolused 1L overnight    Labs: (baseline Cr 0.8) GERA          BUN/Cr- 10/1.0  -->12/1.3 > 16/1.4          Electrolytes-Na 135 // K 4.2 // Mg 2.2 //  Phos 4.5 02-15 @ 05:00    -FENA pending      HEME/ONC:   #DVT prophylaxis    -heparin   Injectable, SCDs    Labs: Hb/Hct:  12.1/38.5  -->,  12.7/39.8  -->,  10.8/34.2  -->          Plts:  530  -->,  538  -->,  419  -->              PTT/INR:  30.4/0.95  --->     T&S Expires: 2/16    ID:  WBC- 10.38  --->>,  10.53  --->>,  11.65  --->>15.8  Antibiotics- antibiotics-piperacillin/tazobactam IVPB.. 3.375 every 8 hours  afebrile     ENDO:    -FSG q6 if NPO or Tube feeds    -Glucose goal 140-180    -if above 180 start ISS     HA1C     MSK:    LINES/DRAINS:  PIV, Rodriguez, Radial A line    ADVANCED DIRECTIVES:  Full Code    HCP/Emergency Contact-    INDICATION FOR SICU: HEMODYNAMIC MONITORING POSTOPERATIVE REQUIRING VASOPRESSORS AND MECHANICAL VENTILATION              DISPO:   Case discussed with attending Dr. Padilla

## 2023-02-15 NOTE — PROGRESS NOTE ADULT - SUBJECTIVE AND OBJECTIVE BOX
GENERAL SURGERY PROGRESS NOTE    Patient: RUBIO HUGHES , 82y ([birth year])Female   MRN: [id number]  [facility]  Visit: 02-10-23 Inpatient  Date: 02-15-23 @ 00:55    Events of past 24 hours:  Lactate worsening  SCr worsening  UO 30cc/hr    Patient easily arousable.     PAST MEDICAL & SURGICAL HISTORY:  Atrial fibrillation    Hypertension    CVA (cerebrovascular accident)    HLD (hyperlipidemia)    GERD (gastroesophageal reflux disease)    History of cholecystectomy    History of hysterectomy    History of bladder suspension procedure      Vitals:   T(F): 98.2 (02-14-23 @ 23:00), Max: 98.2 (02-14-23 @ 23:00)  HR: 66 (02-14-23 @ 23:00)  BP: 119/60 (02-14-23 @ 22:00)  RR: 16 (02-14-23 @ 23:00)  SpO2: 98% (02-14-23 @ 23:00)  Mode: AC/ CMV (Assist Control/ Continuous Mandatory Ventilation), RR (machine): 10, TV (machine): 450, FiO2: 40, PEEP: 5, ITime: 1, MAP: 11, PIP: 26    Diet, NPO      Fluids: lactated ringers.: Solution, 1000 milliLiter(s) infuse at 125 mL/Hr      I & O's:    02-13-23 @ 07:01  -  02-14-23 @ 07:00  --------------------------------------------------------  IN:    dextrose 5% + sodium chloride 0.45%: 1250 mL    IV PiggyBack: 125 mL    Lactated Ringers: 125 mL  Total IN: 1500 mL    OUT:    Oral Fluid: 0 mL    Voided (mL): 350 mL  Total OUT: 350 mL    Total NET: 1150 mL    PHYSICAL EXAM:  General: Intubated. Sedated.  Cardiac: RRR.  Respiratory: On mech vent via ETT. Bilateral chest rise.  Abdomen: Soft, moderately distended, non-tender, no rebound, no guarding. Abthera in place, functioning. Putting out serous fluid.  Skin: Warm/dry, normal color, no jaundice.      MEDICATIONS  (STANDING):  albumin human  5% IVPB 250 milliLiter(s) IV Intermittent every 6 hours  benzocaine 20% Spray 1 Spray(s) Topical once  calcium gluconate IVPB 2 Gram(s) IV Intermittent once  chlorhexidine 0.12% Liquid 15 milliLiter(s) Oral Mucosa every 12 hours  chlorhexidine 2% Cloths 1 Application(s) Topical <User Schedule>  dexMEDEtomidine Infusion 0.05 MICROgram(s)/kG/Hr (1.03 mL/Hr) IV Continuous <Continuous>  heparin   Injectable 5000 Unit(s) SubCutaneous every 8 hours  lactated ringers. 1000 milliLiter(s) (125 mL/Hr) IV Continuous <Continuous>  norepinephrine Infusion 0.01 MICROgram(s)/kG/Min (1.54 mL/Hr) IV Continuous <Continuous>  pantoprazole  Injectable 40 milliGRAM(s) IV Push every 24 hours  phenylephrine    Infusion 0.1 MICROgram(s)/kG/Min (3.08 mL/Hr) IV Continuous <Continuous>  piperacillin/tazobactam IVPB.. 3.375 Gram(s) IV Intermittent every 8 hours    MEDICATIONS  (PRN):  acetaminophen   IVPB .. 1000 milliGRAM(s) IV Intermittent once PRN Mild Pain (1 - 3)  fentaNYL    Injectable 25 MICROGram(s) IV Push every 4 hours PRN Moderate Pain (4 - 6)  ondansetron Injectable 4 milliGRAM(s) IV Push every 6 hours PRN Nausea and/or Vomiting      DVT PROPHYLAXIS: heparin   Injectable 5000 Unit(s) SubCutaneous every 8 hours    GI PROPHYLAXIS: pantoprazole  Injectable 40 milliGRAM(s) IV Push every 24 hours    ANTICOAGULATION:   ANTIBIOTICS:  piperacillin/tazobactam IVPB.. 3.375 Gram(s)        LAB/STUDIES:  Labs:  CAPILLARY BLOOD GLUCOSE                          12.7   11.33 )-----------( 538      ( 14 Feb 2023 16:11 )             39.8       Auto Neutrophil %: 78.1 % (02-14-23 @ 16:11)  Auto Immature Granulocyte %: 0.5 % (02-14-23 @ 16:11)  Auto Neutrophil %: 72.8 % (02-14-23 @ 12:26)  Auto Immature Granulocyte %: 0.6 % (02-14-23 @ 12:26)  Auto Neutrophil %: 73.5 % (02-14-23 @ 06:30)  Auto Immature Granulocyte %: 0.3 % (02-14-23 @ 06:30)    02-14    136  |  104  |  12  ----------------------------<  109<H>  4.8   |  20  |  1.3      Calcium, Total Serum: 7.9 mg/dL (02-14-23 @ 20:37)      LFTs:             3.6  | 0.3  | 17       ------------------[44      ( 14 Feb 2023 20:37 )  2.4  | <0.2 | 14          Lipase:x      Amylase:x         Lactate, Blood: 4.1 mmol/L (02-14-23 @ 20:37)  Blood Gas Arterial, Lactate: 1.90 mmol/L (02-14-23 @ 20:25)  Lactate, Blood: 3.8 mmol/L (02-14-23 @ 12:26)  Lactate, Blood: 3.6 mmol/L (02-14-23 @ 10:28)  Blood Gas Arterial, Lactate: 2.60 mmol/L (02-14-23 @ 09:25)  Blood Gas Arterial, Lactate: 1.50 mmol/L (02-14-23 @ 06:29)    ABG - ( 14 Feb 2023 20:25 )  pH: 7.45  /  pCO2: 25    /  pO2: 93    / HCO3: 17    / Base Excess: -5.0  /  SaO2: 99.2        ABG - ( 14 Feb 2023 09:25 )  pH: 7.32  /  pCO2: 31    /  pO2: 136   / HCO3: 16    / Base Excess: -8.9  /  SaO2: 99.0        ABG - ( 14 Feb 2023 06:29 )  pH: 7.27  /  pCO2: 44    /  pO2: 97    / HCO3: 20    / Base Excess: -6.6  /  SaO2: 99.0        Coags:     10.80  ----< 0.95    ( 14 Feb 2023 00:37 )     30.4        CARDIAC MARKERS ( 14 Feb 2023 16:11 )  x     / <0.01 ng/mL / 59 U/L / x     / 2.7 ng/mL  CARDIAC MARKERS ( 14 Feb 2023 10:28 )  x     / <0.01 ng/mL / 84 U/L / x     / 3.3 ng/mL  CARDIAC MARKERS ( 14 Feb 2023 06:30 )  x     / <0.01 ng/mL / 146 U/L / x     / 3.6 ng/mL      Serum Pro-Brain Natriuretic Peptide: 822 pg/mL (02-14-23 @ 06:30)  Serum Pro-Brain Natriuretic Peptide: 950 pg/mL (02-09-23 @ 22:16)      Culture - Body Fluid with Gram Stain (collected 14 Feb 2023 02:38)  Source: .Body Fluid peritoneal  Gram Stain (14 Feb 2023 18:34):    No polymorphonuclear cells seen    No organisms seen    by cytocentrifuge      IMAGING:  < from: Xray Chest 1 View-PORTABLE IMMEDIATE (Xray Chest 1 View-PORTABLE IMMEDIATE .) (02.14.23 @ 15:36) >  Impression:    Very low lung volumes due to incomplete inspiration.    No consolidation, effusion or pneumothorax.    < end of copied text >      < from: Xray Chest 1 View- PORTABLE-Urgent (Xray Chest 1 View- PORTABLE-Urgent .) (02.14.23 @ 10:33) >  Impression:  Stable support devices.  Unchanged bilateral opacities.    < end of copied text >      < from: Xray Chest 1 View- PORTABLE-Urgent (Xray Chest 1 View- PORTABLE-Urgent .) (02.14.23 @ 06:19) >  Impression:    Low lung volumes. Support devices, in satisfactory position..    No consolidation, effusion or pneumothorax.    < end of copied text >       Virk was d/tania for a voiding trial  after 3 hours patient has 300cc urine on bladder scan  Rn reinserted the virk and it is draining clear urine

## 2023-02-15 NOTE — PROGRESS NOTE ADULT - SUBJECTIVE AND OBJECTIVE BOX
RUBIO HUGHES  [id number]  82y Female    Indication for ICU admission:  Admit Date: 2/9  ICU Date:  2/14  OR Date: 2/14    No Known Allergies    PAST MEDICAL & SURGICAL HISTORY:  Atrial fibrillation  Hypertension  CVA (cerebrovascular accident)  HLD (hyperlipidemia)  GERD (gastroesophageal reflux disease)  History of cholecystectomy  History of hysterectomy  History of bladder suspension procedure      Home Medications:        24HRS EVENT:  2/14  OV  u/o 15cc, Cr 0.8> 1.0>1.3, bolus 1LR  switch from marni to levo  LA 1.9  FENA pending      am  -UO 20 cc past hr  -500 ml LR bolus- then 250ml bolus for NICOM-30.5%  -another 250 ml LR at 940am  5% albumen 250 ml q6 x 4 doses  -f/u repeat cxr  -repeat abg for possible sedation  -rectal ASA every other day  -f/u trop  -repeat ABG 7.32 31 136 16 99%  -Lactate increased to 2.8 (venous)- another 500 LR bolus given  total 1500 ml crystalloid and 250 5% albumen by 1230pm as well as fluids and drips    follw 8pm labs inc lactate  UO increased to 40 ml/hr              REVIEW OF SYSTEMS    [ ] A ten-point review of systems was otherwise negative except as noted.  [ x] Due to altered mental status/intubation, subjective information were not able to be obtained from the patient. History was obtained, to the extent possible, from review of the chart and collateral sources of information    ********************************************************************************************************

## 2023-02-16 NOTE — CHART NOTE - NSCHARTNOTEFT_GEN_A_CORE
PACU ANESTHESIA ADMISSION NOTE  __x__  Intubated  TV:__550____       Rate: ___12___      FiO2: ___50___  ____  Patent Airway  ____  Full return of protective reflexes  ____  Full recovery from anesthesia / back to baseline   Mental Status:    ____ Awake    ____ Alert     ____ Drowsy    _x___ Sedated Precedex 1.2  Nausea/Vomiting:   _x___ NO    ____ Yes,   See Post - Op Orders        Pain Scale (0-10):    ____ Treatment:   ____ None      __x__ See Post - Op/PCA Orders  Post - Operative Fluids:    ____ Oral     _x___ See Post - Op Orders  Plan: Discharge:     ____Home         _____Floor       _x____Critical Care      _____  Other:_________________    Comments:

## 2023-02-16 NOTE — PROGRESS NOTE ADULT - ASSESSMENT
82y Female 4d POD#1 s/p Sigmoid colectomy with ostomy, sigmoid mass resection for SBO/LBO. 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  - f/u pathology  - 3L suctioned out of small bowel, 1.2L removed from NGT intraop  - RTOR 2/16 tentatively  - Abthera at 150mmHg per primary team  #Diet, NPO, NGT in place to suction    -aspiration precautions, HOB 30  #GI Prophylaxis    -Pantoprazole  #Bowel regimen    -HOLDING    /RENAL:   #urine output in crtically ill    -indwelling rodriguez (placed 2/14/2023  #GERA  (baseline Cr 0.8)     -improving peak 1.4    Labs:          BUN/Cr- 16/1.4  -->,  17/1.1  -->          Electrolytes-Na 134 // K 3.9 // Mg 1.8 //  Phos 3.4 (02-15 @ 20:25)        HEME/ONC:   #DVT prophylaxis    -heparin   Injectable, SCDs    Labs: Hb/Hct:  10.8/34.2  -->,  8.3/25.8  -->                      Plts:  419  -->,  294  -->                 PTT/INR:  28.8/1.21  --->     T&S Expires: 2/16, ordered for 2/16 PM    ID:  WBC- 11.33  --->>,  15.88  --->>,  9.87  --->>  Temp trend- 24hrs T(F): 98.7 (02-16 @ 00:00), Max: 101.5 (02-15 @ 08:30)  Current antibiotics-piperacillin/tazobactam IVPB.. 3.375 every 8 hours  COVID PCR for OR received f/u results        ENDO:    -FSG q6 while NPO    -Glucose goal 140-180    -if above 180 start ISS     HA1C       LINES/DRAINS:  PIV, Rodriguez, Right Radial A line, ETT, Abdominal abthera vac    ADVANCED DIRECTIVES:  Full Code      INDICATION FOR SICU: HEMODYNAMIC MONITORING POSTOPERATIVE REQUIRING VASOPRESSORS AND MECHANICAL VENTILATION              DISPO:   OR today for abdominal closure  SICU

## 2023-02-16 NOTE — PRE-OP CHECKLIST - SELECT TESTS ORDERED
Pt stated Charlet Bence was to call in medication for bacterial infection and the pharmacy hasn't received the order , pt's phone number [phone] BMP/CBC/PT/PTT/INR/EKG/CXR BMP/CBC/PT/PTT/INR/Type and Cross/Type and Screen/EKG/CXR/POCT Blood Glucose

## 2023-02-16 NOTE — PROGRESS NOTE ADULT - ASSESSMENT
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    Plan:  #Small Bowel Obstruction   - RTOR today   - Care per SICU   - wean vent/pressors as tolerated   - Ostomy w/ flatus and stool   - f/u NG tube output   - f/u abthera output   - f/u urine output    #Other  Diet: NPO with IVF  GI ppx: pantoprazole  DVT ppx: SQH 5000 q8  Dispo: pending    Trauma/ACS  SPECTRA [id number]

## 2023-02-16 NOTE — BRIEF OPERATIVE NOTE - OPERATION/FINDINGS
Second look laparotomy. Found to have diffusely dilated small bowel (~4cm). Cecum identified, previous cecotomy ischemic appearing. Omental patch applied with 3-0 silk x3 sutures. TI identified and small bowel examined proximally however dense adhesions encountered in jejunum unable to identify LOT. Ascending/transverse/descending colon examined, no signs of ischemia. Rectal stump identified no signs of ischemia or necrosis. Abdominal washout. Unable to approximate fascia, temporary abdominal closure placed.

## 2023-02-16 NOTE — PROGRESS NOTE ADULT - SUBJECTIVE AND OBJECTIVE BOX
RUBIO HUGHES  [id number]  82y Female    Indication for ICU admission:  Admit Date: 2/9  ICU Date:  2/14  OR Date: 2/14    No Known Allergies    PAST MEDICAL & SURGICAL HISTORY:  Atrial fibrillation  Hypertension  CVA (cerebrovascular accident)  HLD (hyperlipidemia)  GERD (gastroesophageal reflux disease)  History of cholecystectomy  History of hysterectomy  History of bladder suspension procedure      Home Medications:        24HRS EVENT:2/15  NIGHT  -Mg/Phos repleted  -Pending OR 2/16 w/ Dr. Smooth INIGUEZ received        DAY  -pending echo today  -OR tomorrow  -rectal asa today  -placed central line for pressor requirements - in place, save clean port for TPN  -ASA every other day   -f/u UA for temp 101.5--> negative  -f/u NICOM- responsive 500cc bolus  -BCx for temp  -f/u BAL    follw 8pm labs inc lactate  UO increased to 40 ml/hr              REVIEW OF SYSTEMS    [ ] A ten-point review of systems was otherwise negative except as noted.  [ x] Due to altered mental status/intubation, subjective information were not able to be obtained from the patient. History was obtained, to the extent possible, from review of the chart and collateral sources of information    ********************************************************************************************************   RUBIO HUGHES  [id number]  82y Female    Indication for ICU admission:  Admit Date:   ICU Date:    OR Date:     No Known Allergies    PAST MEDICAL & SURGICAL HISTORY:  Atrial fibrillation  Hypertension  CVA (cerebrovascular accident)  HLD (hyperlipidemia)  GERD (gastroesophageal reflux disease)  History of cholecystectomy  History of hysterectomy  History of bladder suspension procedure      Home Medications:        24HRS EVENT:2/15  NIGHT  -Mg/Phos repleted  -Pending OR  w/ Dr. Smooth INIGUEZ received        DAY  -pending echo today  -OR tomorrow  -rectal asa today  -placed central line for pressor requirements - in place, save clean port for TPN  -ASA every other day   -f/u UA for temp 101.5--> negative  -f/u NICOM- responsive 500cc bolus  -BCx for temp  -f/u BAL    follw 8pm labs inc lactate  UO increased to 40 ml/hr              REVIEW OF SYSTEMS    [ ] A ten-point review of systems was otherwise negative except as noted.  [ x] Due to altered mental status/intubation, subjective information were not able to be obtained from the patient. History was obtained, to the extent possible, from review of the chart and collateral sources of information    ********************************************************************************************************      Daily Height in cm: 152.4 (2023 08:56)    Daily Weight in k.4 (2023 03:43)    Diet, NPO (23 @ 09:46)      CURRENT MEDS:  Neurologic Medications  acetaminophen   IVPB .. 1000 milliGRAM(s) IV Intermittent once PRN Mild Pain (1 - 3)  aspirin Suppository 300 milliGRAM(s) Rectal <User Schedule>  dexMEDEtomidine Infusion 0.05 MICROgram(s)/kG/Hr IV Continuous <Continuous>  fentaNYL    Injectable 25 MICROGram(s) IV Push every 4 hours PRN Moderate Pain (4 - 6)  ondansetron Injectable 4 milliGRAM(s) IV Push every 6 hours PRN Nausea and/or Vomiting    Respiratory Medications    Cardiovascular Medications  norepinephrine Infusion 0.01 MICROgram(s)/kG/Min IV Continuous <Continuous>    Gastrointestinal Medications  lactated ringers. 1000 milliLiter(s) IV Continuous <Continuous>  pantoprazole  Injectable 40 milliGRAM(s) IV Push every 24 hours    Genitourinary Medications    Hematologic/Oncologic Medications  heparin   Injectable 5000 Unit(s) SubCutaneous every 8 hours    Antimicrobial/Immunologic Medications  piperacillin/tazobactam IVPB.. 3.375 Gram(s) IV Intermittent every 8 hours    Endocrine/Metabolic Medications    Topical/Other Medications  benzocaine 20% Spray 1 Spray(s) Topical once  chlorhexidine 0.12% Liquid 15 milliLiter(s) Oral Mucosa every 12 hours  chlorhexidine 2% Cloths 1 Application(s) Topical <User Schedule>      ICU Vital Signs Last 24 Hrs  T(C): 38 (2023 08:56), Max: 38 (2023 08:00)  T(F): 100.4 (2023 08:00), Max: 100.4 (2023 08:00)  HR: 64 (2023 09:00) (60 - 70)  BP: 154/70 (2023 09:00) (138/65 - 166/72)  BP(mean): 101 (2023 09:00) (88 - 103)  ABP: 146/45 (2023 09:00) (112/39 - 158/46)  ABP(mean): 79 (2023 09:00) (64 - 87)  RR: 22 (2023 09:00) (16 - 23)  SpO2: 97% (2023 09:00) (95% - 100%)    O2 Parameters below as of 2023 09:00  Patient On (Oxygen Delivery Method): ventilator            Adult Advanced Hemodynamics Last 24 Hrs  CVP(mm Hg): --  CVP(cm H2O): --  CO: --  CI: --  PA: --  PA(mean): --  PCWP: --  SVR: --  SVRI: --  PVR: --  PVRI: --    Mode: AC/ CMV (Assist Control/ Continuous Mandatory Ventilation)  RR (machine): 10  TV (machine): 450  FiO2: 40  PEEP: 5  ITime: 1  MAP: 12  PIP: 28    ABG - ( 2023 04:30 )  pH, Arterial: 7.46  pH, Blood: x     /  pCO2: 31    /  pO2: 89    / HCO3: 22    / Base Excess: -0.9  /  SaO2: 99.3                I&O's Summary    15 Feb 2023 07:  -  2023 07:00  --------------------------------------------------------  IN: 5753.1 mL / OUT: 4265 mL / NET: 1488.1 mL    2023 07:01  -  2023 10:06  --------------------------------------------------------  IN: 325 mL / OUT: 100 mL / NET: 225 mL      I&O's Detail    15 Feb 2023 07:01  -  2023 07:00  --------------------------------------------------------  IN:    Dexmedetomidine: 443.4 mL    IV PiggyBack: 100 mL    IV PiggyBack: 300 mL    Lactated Ringers: 2875 mL    Lactated Ringers Bolus: 1750 mL    Norepinephrine: 114.7 mL    Norepinephrine: 170 mL  Total IN: 5753.1 mL    OUT:    Colostomy (mL): 70 mL    Indwelling Catheter - Urethral (mL): 1270 mL    Nasogastric/Oral tube (mL): 1000 mL    VAC (Vacuum Assisted Closure) System (mL): 1925 mL  Total OUT: 4265 mL    Total NET: 1488.1 mL      2023 07:01  -  2023 10:06  --------------------------------------------------------  IN:    Dexmedetomidine: 48 mL    Lactated Ringers: 250 mL    Norepinephrine: 27 mL  Total IN: 325 mL    OUT:    Indwelling Catheter - Urethral (mL): 100 mL  Total OUT: 100 mL    Total NET: 225 mL          PHYSICAL EXAM:    General/Neuro  RASS:   1     Deficits:  Intubated and sedated, following commands    Lungs:      clear to auscultation, Normal expansion/effort.     Cardiovascular : S1, S2.  Regular rate and rhythm. + Peripheral edema   Cardiac Rhythm: Normal Sinus Rhythm    GI: Abdomen soft, Non-tender, Non-distended.    Nasogastric tube in place.  Colostomy   Wound vac in place    Extremities: Extremities warm, pink, well-perfused.     Derm: Good skin turgor, no skin breakdown.      :       Donis catheter in place.      CXR:       LABS:  CAPILLARY BLOOD GLUCOSE      POCT Blood Glucose.: 85 mg/dL (2023 08:33)  POCT Blood Glucose.: 109 mg/dL (15 Feb 2023 19:03)  POCT Blood Glucose.: 105 mg/dL (15 Feb 2023 12:51)                          8.3    9.87  )-----------( 294      ( 15 Feb 2023 20:25 )             25.8       02-15    134<L>  |  105  |  17  ----------------------------<  98  3.9   |  22  |  1.1    Ca    7.5<L>      15 Feb 2023 20:25  Phos  3.4     02-15  Mg     1.8     02-15    TPro  3.6<L>  /  Alb  2.4<L>  /  TBili  0.3  /  DBili  <0.2  /  AST  17  /  ALT  14  /  AlkPhos  44  02-14      PT/INR - ( 15 Feb 2023 20:25 )   PT: 13.90 sec;   INR: 1.21 ratio         PTT - ( 15 Feb 2023 20:25 )  PTT:28.8 sec  CARDIAC MARKERS ( 2023 16:11 )  x     / <0.01 ng/mL / 59 U/L / x     / 2.7 ng/mL  CARDIAC MARKERS ( 2023 10:28 )  x     / <0.01 ng/mL / 84 U/L / x     / 3.3 ng/mL      Urinalysis Basic - ( 15 Feb 2023 10:30 )    Color: Yellow / Appearance: Slightly Turbid / S.037 / pH: x  Gluc: x / Ketone: Small  / Bili: Negative / Urobili: <2 mg/dL   Blood: x / Protein: 30 mg/dL / Nitrite: Negative   Leuk Esterase: Negative / RBC: 8 /HPF / WBC 5 /HPF   Sq Epi: x / Non Sq Epi: 11 /HPF / Bacteria: Negative        Culture - Sputum (collected 15 Feb 2023 15:34)  Source: ET Tube ET Tube  Gram Stain (2023 09:32):    No polymorphonuclear leukocytes per low power field    No Squamous epithelial cells per low power field    Rare Gram Negative Rods seen per oil power field    Culture - Body Fluid with Gram Stain (collected 2023 02:38)  Source: .Body Fluid peritoneal  Gram Stain (2023 18:34):    No polymorphonuclear cells seen    No organisms seen    by cytocentrifuge  Preliminary Report (15 Feb 2023 13:36):    No growth to date.

## 2023-02-16 NOTE — PROGRESS NOTE ADULT - SUBJECTIVE AND OBJECTIVE BOX
GENERAL SURGERY PROGRESS NOTE    Patient: RUBIO HUGHES , 82y (36)Female   MRN: [id number]  Location: [address]  Visit: 02-10-23 Inpatient  Date: 23 @ 07:56    Hospital Day #: 8  Post-Op Day #: 2    Procedure/Dx/Injuries:    Events of past 24 hours: Will be returning to OR today for washout and closure.     PAST MEDICAL & SURGICAL HISTORY:  Atrial fibrillation  Hypertension  CVA (cerebrovascular accident)  HLD (hyperlipidemia)  GERD (gastroesophageal reflux disease)  History of cholecystectomy  History of hysterectomy  History of bladder suspension procedure    Vitals:   T(F): 98.7 (23 @ 00:00), Max: 101.5 (02-15-23 @ 08:30)  HR: 63 (23 @ 07:00)  BP: 166/72 (23 @ 07:00)  RR: 16 (23 @ 00:00)  SpO2: 96% (23 @ 07:00)  Mode: AC/ CMV (Assist Control/ Continuous Mandatory Ventilation), RR (machine): 10, TV (machine): 450, FiO2: 40, PEEP: 5, ITime: 1, MAP: 12, PIP: 28    Diet, NPO      Fluids:     I & O's:    02-15-23 @ 07:01  -  23 @ 07:00  --------------------------------------------------------  IN:    Dexmedetomidine: 443.4 mL    IV PiggyBack: 100 mL    IV PiggyBack: 300 mL    Lactated Ringers: 2875 mL    Lactated Ringers Bolus: 1750 mL    Norepinephrine: 112.5 mL    Norepinephrine: 170 mL  Total IN: 5750.9 mL    OUT:    Colostomy (mL): 70 mL    Indwelling Catheter - Urethral (mL): 1270 mL    Nasogastric/Oral tube (mL): 1000 mL    VAC (Vacuum Assisted Closure) System (mL): 1950 mL  Total OUT: 4290 mL    Total NET: 1460.9 mL      PHYSICAL EXAM:  GENERAL: intubated, sedated  CHEST/LUNG: Clear to auscultation bilaterally  HEART: Regular rate and rhythm  ABDOMEN: Soft, Nontender, Nondistended; abthera in place  EXTREMITIES:  No clubbing, cyanosis, or edema      MEDICATIONS  (STANDING):  aspirin Suppository 300 milliGRAM(s) Rectal <User Schedule>  benzocaine 20% Spray 1 Spray(s) Topical once  chlorhexidine 0.12% Liquid 15 milliLiter(s) Oral Mucosa every 12 hours  chlorhexidine 2% Cloths 1 Application(s) Topical <User Schedule>  dexMEDEtomidine Infusion 0.05 MICROgram(s)/kG/Hr (1.03 mL/Hr) IV Continuous <Continuous>  heparin   Injectable 5000 Unit(s) SubCutaneous every 8 hours  lactated ringers. 1000 milliLiter(s) (125 mL/Hr) IV Continuous <Continuous>  norepinephrine Infusion 0.01 MICROgram(s)/kG/Min (1.54 mL/Hr) IV Continuous <Continuous>  pantoprazole  Injectable 40 milliGRAM(s) IV Push every 24 hours  piperacillin/tazobactam IVPB.. 3.375 Gram(s) IV Intermittent every 8 hours    MEDICATIONS  (PRN):  acetaminophen   IVPB .. 1000 milliGRAM(s) IV Intermittent once PRN Mild Pain (1 - 3)  fentaNYL    Injectable 25 MICROGram(s) IV Push every 4 hours PRN Moderate Pain (4 - 6)  ondansetron Injectable 4 milliGRAM(s) IV Push every 6 hours PRN Nausea and/or Vomiting      DVT PROPHYLAXIS: heparin   Injectable 5000 Unit(s) SubCutaneous every 8 hours    GI PROPHYLAXIS: pantoprazole  Injectable 40 milliGRAM(s) IV Push every 24 hours    ANTICOAGULATION:   ANTIBIOTICS:  piperacillin/tazobactam IVPB.. 3.375 Gram(s)            LAB/STUDIES:  Labs:  CAPILLARY BLOOD GLUCOSE      POCT Blood Glucose.: 109 mg/dL (15 Feb 2023 19:03)  POCT Blood Glucose.: 105 mg/dL (15 Feb 2023 12:51)                          8.3    9.87  )-----------( 294      ( 15 Feb 2023 20:25 )             25.8         02-15    134<L>  |  105  |  17  ----------------------------<  98  3.9   |  22  |  1.1      Calcium, Total Serum: 7.5 mg/dL (02-15-23 @ 20:25)      LFTs:             3.6  | 0.3  | 17       ------------------[44      ( 2023 20:37 )  2.4  | <0.2 | 14          Lipase:x      Amylase:x         Blood Gas Arterial, Lactate: 1.00 mmol/L (23 @ 04:30)  Blood Gas Arterial, Lactate: 1.20 mmol/L (02-15-23 @ 03:24)  Lactate, Blood: 4.1 mmol/L (23 @ 20:37)  Blood Gas Arterial, Lactate: 1.90 mmol/L (23 @ 20:25)  Lactate, Blood: 3.8 mmol/L (23 @ 12:26)  Lactate, Blood: 3.6 mmol/L (23 @ 10:28)  Blood Gas Arterial, Lactate: 2.60 mmol/L (23 @ 09:25)  Blood Gas Arterial, Lactate: 1.50 mmol/L (23 @ 06:29)    ABG - ( 2023 04:30 )  pH: 7.46  /  pCO2: 31    /  pO2: 89    / HCO3: 22    / Base Excess: -0.9  /  SaO2: 99.3            ABG - ( 15 Feb 2023 03:24 )  pH: 7.41  /  pCO2: 27    /  pO2: 87    / HCO3: 17    / Base Excess: -6.0  /  SaO2: 98.1            ABG - ( 2023 20:25 )  pH: 7.45  /  pCO2: 25    /  pO2: 93    / HCO3: 17    / Base Excess: -5.0  /  SaO2: 99.2              Coags:     13.90  ----< 1.21    ( 15 Feb 2023 20:25 )     28.8        CARDIAC MARKERS ( 2023 16:11 )  x     / <0.01 ng/mL / 59 U/L / x     / 2.7 ng/mL  CARDIAC MARKERS ( 2023 10:28 )  x     / <0.01 ng/mL / 84 U/L / x     / 3.3 ng/mL      Serum Pro-Brain Natriuretic Peptide: 822 pg/mL (23 @ 06:30)  Serum Pro-Brain Natriuretic Peptide: 950 pg/mL (23 @ 22:16)      Urinalysis Basic - ( 15 Feb 2023 10:30 )    Color: Yellow / Appearance: Slightly Turbid / S.037 / pH: x  Gluc: x / Ketone: Small  / Bili: Negative / Urobili: <2 mg/dL   Blood: x / Protein: 30 mg/dL / Nitrite: Negative   Leuk Esterase: Negative / RBC: 8 /HPF / WBC 5 /HPF   Sq Epi: x / Non Sq Epi: 11 /HPF / Bacteria: Negative        Culture - Body Fluid with Gram Stain (collected 2023 02:38)  Source: .Body Fluid peritoneal  Gram Stain (2023 18:34):    No polymorphonuclear cells seen    No organisms seen    by cytocentrifuge  Preliminary Report (15 Feb 2023 13:36):    No growth to date.              IMAGING:      ACCESS/ DEVICES:  [x ] Peripheral IV

## 2023-02-16 NOTE — CHART NOTE - NSCHARTNOTEFT_GEN_A_CORE
RUBIO HUGHES   [id number] (1940)    Event: s/p abdominal   Findings: Second look laparotomy. Found to have diffusely dilated small bowel (~4cm). Cecum identified, previous cecotomy ischemic appearing. Omental patch applied with 3-0 silk x3 sutures. TI identified and small bowel examined proximally however dense adhesions encountered in jejunum unable to identify LOT. Ascending/transverse/descending colon examined, no signs of ischemia. Rectal stump identified no signs of ischemia or necrosis. Abdominal washout. Unable to approximate fascia, temporary abdominal closure placed with abthera vac    Intraop: No hemodynamic instability, saturation noted to be 88-90s, FIo2 50%      Time: 45mins  IVF: 500cc NS  EBL: 5cc  UO: 50cc via rodriguez    PE:  intubated, equal chest rise  sedated  abthera vac in place  NG tube on wall suction  rodriguez in place        T(C): 37.7 (02-16), Max: 38.1 (02-16)  HR: 63 (02-16) (60 - 69)  BP: 148/68 (02-16) (120/57 - 166/72)  BP(mean): 98 (02-16) (82 - 103)  ABP: 134/45 (02-16) (112/39 - 159/49)  ABP(mean): 75 (02-16) (64 - 87)  RR: 17 (02-16) (15 - 23)  SpO2: 98% (02-16) (95% - 100%)        A/P  -post operative CXR  -post operative ABG  -labs at 8pm  -RTOR plan pending  -Will assess fluid status for diuresis   -Wean down levophed  -Wean Fio2 and PEEP, currently 7/50%    Case d/w Dr. Rebollar RUBIO HUGHES   [id number] (1940)    Event: s/p take down of abdominal abthera vac, examination of bowel, omental patch repair of previous cecectomy site due to ischemic appearance    Findings: Second look laparotomy. Found to have diffusely dilated small bowel (~4cm). Cecum identified, previous cecotomy ischemic appearing. Omental patch applied with 3-0 silk x3 sutures. TI identified and small bowel examined proximally however dense adhesions encountered in jejunum unable to identify LOT. Ascending/transverse/descending colon examined, no signs of ischemia. Rectal stump identified no signs of ischemia or necrosis. Abdominal washout. Unable to approximate fascia, temporary abdominal closure placed with abthera vac    Intraop: No hemodynamic instability, saturation noted to be 88-90s, FIo2 50%      Time: 45mins  IVF: 500cc NS  EBL: 5cc  UO: 50cc via rodriguez    PE:  intubated, equal chest rise  b/l breath sounds  sedated  abthera vac in place  NG tube on wall suction  rodriguez in place      T(C): 37.7 (02-16), Max: 38.1 (02-16)  HR: 63 (02-16) (60 - 69)  BP: 148/68 (02-16) (120/57 - 166/72)  BP(mean): 98 (02-16) (82 - 103)  ABP: 134/45 (02-16) (112/39 - 159/49)  ABP(mean): 75 (02-16) (64 - 87)  RR: 17 (02-16) (15 - 23)  SpO2: 98% (02-16) (95% - 100%)        A/P  -post operative CXR  -post operative ABG  -Diet STRICT NPO  -Nutrition consult for TPN  -labs at 8pm  -RTOR plan pending  -Will assess fluid status for diuresis   -Wean down levophed  -Wean Fio2 and PEEP, currently 7/50%    Case d/w Dr. Rebollar

## 2023-02-17 NOTE — PROGRESS NOTE ADULT - ASSESSMENT
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    Plan:  #Small Bowel Obstruction  # s/p second look laparotomy   - Care per SICU   - wean vent/pressors as tolerated   - Ostomy w/ flatus and stool   - f/u NG tube output   - f/u abthera output   - f/u urine output    #Other  Diet: NPO with IVF  Nutrition c/s pending  GI ppx: pantoprazole  DVT ppx: SQH 5000 q8  Dispo: pending    Trauma/ACS  SPECTRA [id number]

## 2023-02-17 NOTE — PROGRESS NOTE ADULT - ASSESSMENT
Assessment and Plan:   82y Female 4d   2/16: Previous cecetomy site noted to be ischemic, omental patch repair, rest of bowel no ischemic changes, bowel edematous, abthera vac in place, abdomen open  2/14: s/p Sigmoid colectomy with ostomy, sigmoid mass resection for SBO/LBO.     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  - f/u pathology  - 3L suctioned out of small bowel, 1.2L removed from NGT intraop  - RTOR 2/16: Previous cecetomy site noted to be ischemic, omental patch repair, rest of bowel no ischemic changes, bowel edematous, abthera vac in place, abdomen open, discuss next RTOR with primary team   - Abthera at 150mmHg per primary team, f/u TPN   #Diet, NPO, NGT in place to suction    -aspiration precautions, HOB 30  #GI Prophylaxis    -Pantoprazole  #Bowel regimen    -HOLDING    /RENAL:   #urine output in crtically ill    -indwelling rodriguez (placed 2/14/2023  #GERA  (baseline Cr 0.8)     -improving    -peak 1.4    Labs:          BUN/Cr- 17/1.1  -->,  14/0.7  -->          Electrolytes-Na 136 // K 3.9 // Mg 1.6 //  Phos 3.5 (02-16 @ 19:11)       HEME/ONC:   #DVT prophylaxis    -heparin   Injectable, SCDs    Labs: Hb/Hct:  9.0/28.5  -->,  8.8/27.5  -->                      Plts:  301  -->,  348  -->                 PTT/INR:          ID:  WBC- 8.37  --->>,  6.68  --->>,  8.43  --->>  Temp trend- 24hrs T(F): 97.1 (02-17 @ 00:00), Max: 100.5 (02-16 @ 11:15)  Antibiotics-  Cultures:        (collected 02-15)  Source: ET Tube ET Tube  Preliminary Report:    Moderate Klebsiella pneumoniae     (collected 02-15)  Source: .Blood Blood  Preliminary Report:    No growth to date.     (collected 02-14)  Source: .Body Fluid peritoneal  Preliminary Report:    No growth to date.      ENDO:    -FSG q6 while NPO    -Glucose goal 140-180    -if above 180 start ISS     HA1C       LINES/DRAINS:  PIV, Rodriguez, Right Radial A line, ETT, Abdominal abthera vac    ADVANCED DIRECTIVES:  Full Code      INDICATION FOR SICU: HEMODYNAMIC MONITORING POSTOPERATIVE REQUIRING VASOPRESSORS AND MECHANICAL VENTILATION                        Assessment and Plan:   82y Female 4d   2/16: Previous cecetomy site noted to be ischemic, omental patch repair, rest of bowel no ischemic changes, bowel edematous, abthera vac in place, abdomen open  2/14: s/p Sigmoid colectomy with ostomy, sigmoid mass resection for SBO/LBO.     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  - f/u pathology  - 3L suctioned out of small bowel, 1.2L removed from NGT intraop  - RTOR 2/16: Previous cecetomy site noted to be ischemic, omental patch repair, rest of bowel no ischemic changes, bowel edematous, abthera vac in place, abdomen open, discuss next RTOR with primary team   - Abthera at 150mmHg per primary team, f/u TPN   #Diet, NPO, NGT in place to suction    -aspiration precautions, HOB 30  #GI Prophylaxis    -Pantoprazole  #Bowel regimen    -HOLDING    /RENAL:   #urine output in crtically ill    -indwelling rodriguez (placed 2/14/2023  #GERA  (baseline Cr 0.8)     -improving    -peak 1.4    Labs:          BUN/Cr- 14/0.7  -->,  15/0.7  -->          Electrolytes-Na 136 // K 4.2 // Mg 2.0 //  Phos 3.4 (02-17 @ 04:45)      HEME/ONC:   #DVT prophylaxis    -heparin   Injectable, SCDs    Labs: Hb/Hct:  9.0/28.5  -->,  8.8/27.5  -->                      Plts:  301  -->,  348  -->                 PTT/INR:          ID:  WBC- 8.37  --->>,  6.68  --->>,  8.43  --->>  Temp trend- 24hrs T(F): 97.1 (02-17 @ 00:00), Max: 100.5 (02-16 @ 11:15)  Antibiotics-  Cultures:        (collected 02-15)  Source: ET Tube ET Tube  Preliminary Report:    Moderate Klebsiella pneumoniae     (collected 02-15)  Source: .Blood Blood  Preliminary Report:    No growth to date.     (collected 02-14)  Source: .Body Fluid peritoneal  Preliminary Report:    No growth to date.      ENDO:    -FSG q6 while NPO    -Glucose goal 140-180    -if above 180 start ISS     HA1C       LINES/DRAINS:  PIV, Rodriguez, Right Radial A line, ETT, Abdominal abthera vac    ADVANCED DIRECTIVES:  Full Code      INDICATION FOR SICU: HEMODYNAMIC MONITORING POSTOPERATIVE REQUIRING VASOPRESSORS AND MECHANICAL VENTILATION                        Assessment and Plan:   82y Female 4d   2/16: Previous cecetomy site noted to be ischemic, omental patch repair, rest of bowel no ischemic changes, bowel edematous, abthera vac in place, abdomen open  2/14: s/p Sigmoid colectomy with ostomy, sigmoid mass resection for SBO/LBO.     NEURO:  #Acute pain    -APAP IV prn    -Fentanyl 25 mcg prn  #Acute sedation    -Precedex infusion    RESP:   #Oxygenation  RR (machine): 10, TV (machine): 450, FiO2: 40, PEEP: 5  -ET tube lip line 21, ETT advanced 2/17 for 1cm to 22cm  -Daily CXR while intubated  ABG - ( 17 Feb 2023 03:49 )  pH, Arterial: 7.43  pH, Blood: x     /  pCO2: 31    /  pO2: 120   / HCO3: 21    / Base Excess: -3.1  /  SaO2: 99.0      CARDS:   #hypotension 2/2 hypovolemic shock  -Wean levophed  -#Imaging/labs  -Echo pending  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  - f/u pathology  - 3L suctioned out of small bowel, 1.2L removed from NGT intraop  - RTOR 2/16: Previous cecetomy site noted to be ischemic, omental patch repair, rest of bowel no ischemic changes, bowel edematous, abthera vac in place, abdomen open, discuss next RTOR with primary team   - Abthera at 150mmHg per primary team  - TPN to start 2/17 night   #Diet, NPO, NGT in place to suction    -aspiration precautions, HOB 30  #GI Prophylaxis    -Pantoprazole  #Bowel regimen    -HOLDING    /RENAL:   #urine output in crtically ill    -indwelling rodriguez (placed 2/14/2023  #GERA  (baseline Cr 0.8)     -improving    -peak 1.4    Labs:          BUN/Cr- 14/0.7  -->,  15/0.7  -->          Electrolytes-Na 136 // K 4.2 // Mg 2.0 //  Phos 3.4 (02-17 @ 04:45)    HEME/ONC:   #DVT prophylaxis    -heparin   Injectable, SCDs    Labs: Hb/Hct:  9.0/28.5  -->,  8.8/27.5  -->                      Plts:  301  -->,  348  -->                 PTT/INR:          ID:  WBC- 8.37  --->>,  6.68  --->>,  8.43  --->>  Temp trend- 24hrs T(F): 97.1 (02-17 @ 00:00), Max: 100.5 (02-16 @ 11:15)  Antibiotics-  Cultures:        (collected 02-15)  Source: ET Tube ET Tube  Preliminary Report:    Moderate Klebsiella pneumoniae     (collected 02-15)  Source: .Blood Blood  Preliminary Report:    No growth to date.     (collected 02-14)  Source: .Body Fluid peritoneal  Preliminary Report:    No growth to date.    ENDO:    -FSG q6 while NPO    -Glucose goal 140-180    -if above 180 start ISS     HA1C     LINES/DRAINS:  PIV, Rodriguez, Right Radial A line, ETT, Abdominal abthera vac    ADVANCED DIRECTIVES:  Full Code    INDICATION FOR SICU: HEMODYNAMIC MONITORING POSTOPERATIVE REQUIRING VASOPRESSORS AND MECHANICAL VENTILATION

## 2023-02-17 NOTE — CHART NOTE - NSCHARTNOTEFT_GEN_A_CORE
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 She has had nausea and emesis which she described as bilious associated with the pain. She has had no bowel movements for the past three days but states that she has had flatus as soon as today.  She reports having diarrhea five days ago. Patient denied hematochezia, hematemesis, chest pain, cough change from chronic baseline, fever, sick contacts, nor urinary symptoms. (10 Feb 2023 02:21)      NUTRITION SUPPORT NOTE:        REVIEW OF SYSTEMS:  Negative except as noted above.     PAST MEDICAL/SURGICAL HISTORY:   Atrial fibrillation  Hypertension  CVA (cerebrovascular accident)  HLD (hyperlipidemia)  GERD (gastroesophageal reflux disease)  History of cholecystectomy  History of hysterectomy  History of bladder suspension procedure      ALLERGIES:  No Known Allergies      VITALS:  T(F): 99.2 (02-17 @ 08:00), Max: 99.2 (02-17 @ 08:00)  HR: 68 (02-17 @ 08:35) (58 - 68)  BP: 124/60 (02-17 @ 08:00) (118/57 - 145/63)  RR: 21 (02-17 @ 08:00) (16 - 21)  SpO2: 97% (02-17 @ 08:35) (97% - 99%)    HEIGHT/WEIGHT/BMI:   Height (cm): 152.4 (02-16)  Weight (kg): 82.1 (02-16)  BMI (kg/m2): 35.3 (02-16)    PHYSICAL EXAM:   GENERAL: NAD,    ABDOMEN: Soft, Nontender, Nondistended  EXTREMITIES:  No clubbing, cyanosis, or edema  SKIN: warm and well perfused; No obvious rashes or lesions  IV ACCESS: RT IJ TLC  ENTERAL ACCESS: NG [place]    I/Os:     02-16-23 @ 07:01  -  02-17-23 @ 07:00  --------------------------------------------------------  IN:    Dexmedetomidine: 567.6 mL    IV PiggyBack: 100 mL    IV PiggyBack: 100 mL    Lactated Ringers: 2875 mL    Norepinephrine: 45.7 mL    Norepinephrine: 73.5 mL  Total IN: 3761.8 mL    OUT:    Colostomy (mL): 25 mL    Indwelling Catheter - Urethral (mL): 1425 mL    Nasogastric/Oral tube (mL): 700 mL    VAC (Vacuum Assisted Closure) System (mL): 700 mL  Total OUT: 2850 mL    Total NET: 911.8 mL      STANDING MEDICATIONS:   aspirin Suppository 300 milliGRAM(s) Rectal <User Schedule>  benzocaine 20% Spray 1 Spray(s) Topical once  chlorhexidine 0.12% Liquid 15 milliLiter(s) Oral Mucosa every 12 hours  chlorhexidine 2% Cloths 1 Application(s) Topical <User Schedule>  dexMEDEtomidine Infusion 0.05 MICROgram(s)/kG/Hr IV Continuous <Continuous>  fentaNYL    Injectable 25 MICROGram(s) IV Push every 4 hours PRN  heparin   Injectable 5000 Unit(s) SubCutaneous every 8 hours  lactated ringers. 1000 milliLiter(s) IV Continuous <Continuous>  norepinephrine Infusion 0.05 MICROgram(s)/kG/Min IV Continuous <Continuous>  ondansetron Injectable 4 milliGRAM(s) IV Push every 6 hours PRN  pantoprazole  Injectable 40 milliGRAM(s) IV Push every 24 hours  sodium chloride 0.9% Bolus 250 milliLiter(s) IV Bolus once      LABS:                         8.8    8.43  )-----------( 348      ( 17 Feb 2023 04:45 )             27.5     136  |  106  |  15  ----------------------------<  89          (02-17-23 @ 04:45)  4.2   |  22  |  0.7    Ca    6.9<L>          (02-17-23 @ 04:45)  Phos  3.4         (02-17-23 @ 04:45)  Mg     2.0         (02-17-23 @ 04:45)      Triglycerides, Serum: 196 mg/dL (02-17 @ 09:21)    Blood Glucose (Past 24 hours):  80 mg/dL (02-17 @ 05:50)  94 mg/dL (02-16 @ 23:53)  92 mg/dL (02-16 @ 18:08)      DIET:   Diet, NPO (02-13-23 @ 09:46) [Active]        ASSESSMENT  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,4,5 digit numbness) on aspirin and no other antiplatelet/anticoagulant agents,  GERD, HTN, HLD, chronic UTIs, pasty cholecystectomy(>20yrs ago), hysterectomy (>45yrs ago), and bladder lift (>30yrs ago) presented to the ED with SBO s/p diagnostic lap converted to exploratory laparotomy, sigmoidectomy, cecal enterotomy w/ stool expression and closure, descending colostomy, and abthera placement          PLAN  - TG level added to am bloodwork  - start TPN tonight  - daily am bmp, in phos, mg while on parenteral nutrition  - glycemic control, well controlled now  - will follow NUTRITION SUPPORT TEAM  -  CONSULT NOTE     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 She has had nausea and emesis which she described as bilious associated with the pain. She has had no bowel movements for the past three days but states that she has had flatus as soon as today.  She reports having diarrhea five days ago. Patient denied hematochezia, hematemesis, chest pain, cough change from chronic baseline, fever, sick contacts, nor urinary symptoms. (10 Feb 2023 02:21)    Imaging demonstrated: Pneumatosis intestinalis of cecum and ascending colon. Fluid-filled distended SB loops w/ transition to normal bowel along ileum in mid pelvis concerning for SBO. No pneumoperitoneum.    2/14: s/p Sigmoid colectomy with ostomy, sigmoid mass resection for SBO/LBO.   2/16: Previous cecetomy site noted to be ischemic, omental patch repair, rest of bowel no ischemic changes, bowel edematous, abthera vac in place, abdomen open      NUTRITION SUPPORT NOTE:    Today pt awake, intubated. Requiring levophed. Abd open with Vac in place  NPO, consult called today for TPN  ETT growing Klebsiella  d/w surgical team, RN      REVIEW OF SYSTEMS:  Negative except as noted above.     PAST MEDICAL/SURGICAL HISTORY:   Atrial fibrillation  Hypertension  CVA (cerebrovascular accident)  HLD (hyperlipidemia)  GERD (gastroesophageal reflux disease)  History of cholecystectomy  History of hysterectomy  History of bladder suspension procedure      ALLERGIES:  No Known Allergies      VITALS:  T(F): 99.2 (02-17 @ 08:00), Max: 99.2 (02-17 @ 08:00)  HR: 68 (02-17 @ 08:35) (58 - 68)  BP: 124/60 (02-17 @ 08:00) (118/57 - 145/63)  RR: 21 (02-17 @ 08:00) (16 - 21)  SpO2: 97% (02-17 @ 08:35) (97% - 99%)    HEIGHT/WEIGHT/BMI:   Height (cm): 152.4 (02-16)  Weight (kg): 82.1 (02-16)  BMI (kg/m2): 35.3 (02-16)    PHYSICAL EXAM:   GENERAL: Awake on vent  ABDOMEN: open with vac in place   EXTREMITIES:  No clubbing, cyanosis, or edema  SKIN: warm and well perfused; No obvious rashes or lesions  IV ACCESS: RT IJ TLC (2/15 with brown port saved for TPN)  ENTERAL ACCESS: MIREYA [place]    I/Os:     02-16-23 @ 07:01  -  02-17-23 @ 07:00  --------------------------------------------------------  IN:    Dexmedetomidine: 567.6 mL    IV PiggyBack: 100 mL    IV PiggyBack: 100 mL    Lactated Ringers: 2875 mL    Norepinephrine: 45.7 mL    Norepinephrine: 73.5 mL  Total IN: 3761.8 mL    OUT:    Colostomy (mL): 25 mL    Indwelling Catheter - Urethral (mL): 1425 mL    Nasogastric/Oral tube (mL): 700 mL    VAC (Vacuum Assisted Closure) System (mL): 700 mL  Total OUT: 2850 mL    Total NET: 911.8 mL      STANDING MEDICATIONS:   aspirin Suppository 300 milliGRAM(s) Rectal <User Schedule>  benzocaine 20% Spray 1 Spray(s) Topical once  chlorhexidine 0.12% Liquid 15 milliLiter(s) Oral Mucosa every 12 hours  chlorhexidine 2% Cloths 1 Application(s) Topical <User Schedule>  dexMEDEtomidine Infusion 0.05 MICROgram(s)/kG/Hr IV Continuous <Continuous>  fentaNYL    Injectable 25 MICROGram(s) IV Push every 4 hours PRN  heparin   Injectable 5000 Unit(s) SubCutaneous every 8 hours  lactated ringers. 1000 milliLiter(s) IV Continuous <Continuous>  norepinephrine Infusion 0.05 MICROgram(s)/kG/Min IV Continuous <Continuous>  ondansetron Injectable 4 milliGRAM(s) IV Push every 6 hours PRN  pantoprazole  Injectable 40 milliGRAM(s) IV Push every 24 hours  sodium chloride 0.9% Bolus 250 milliLiter(s) IV Bolus once      LABS:                         8.8    8.43  )-----------( 348      ( 17 Feb 2023 04:45 )             27.5     136  |  106  |  15  ----------------------------<  89          (02-17-23 @ 04:45)  4.2   |  22  |  0.7    Ca    6.9<L>          (02-17-23 @ 04:45)  Phos  3.4         (02-17-23 @ 04:45)  Mg     2.0         (02-17-23 @ 04:45)      Triglycerides, Serum: 196 mg/dL (02-17 @ 09:21)    Blood Glucose (Past 24 hours):  80 mg/dL (02-17 @ 05:50)  94 mg/dL (02-16 @ 23:53)  92 mg/dL (02-16 @ 18:08)      DIET:   Diet, NPO (02-13-23 @ 09:46) [Active]      ASSESSMENT  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,4,5 digit numbness) on aspirin and no other antiplatelet/anticoagulant agents,  GERD, HTN, HLD, chronic UTIs, pasty cholecystectomy(>20yrs ago), hysterectomy (>45yrs ago), and bladder lift (>30yrs ago) presented to the ED with SBO s/p diagnostic lap converted to exploratory laparotomy, sigmoidectomy, cecal enterotomy w/ stool expression and closure, descending colostomy, and abthera placement    - NPO since admission  - obesity, BMI 35.3  - at high risk for malnutrition and surgical complications in absence of adequate nutrition support      PLAN  - TG level added to am bloodwork  - start TPN tonight  - daily am bmp, in phos, mg while on parenteral nutrition  - glycemic control, well controlled now  - will follow

## 2023-02-17 NOTE — PROGRESS NOTE ADULT - SUBJECTIVE AND OBJECTIVE BOX
SURGERY PROGRESS NOTE     Patient: RUBIO HUGHES , 82y (36)Female   MRN: [id number]  Location: [address]  Visit: 02-10-23 Inpatient  Date: 23 @ 00:33       Events of past 24 hours:  Patient seen resting in bed. Intubated. s/p second look laparotomy. No acute events overnight.     PAST MEDICAL & SURGICAL HISTORY:  Atrial fibrillation      Hypertension      CVA (cerebrovascular accident)      HLD (hyperlipidemia)      GERD (gastroesophageal reflux disease)      History of cholecystectomy      History of hysterectomy      History of bladder suspension procedure           Vitals:   T(F): 97.1 (23 @ 00:00), Max: 100.5 (23 @ 11:15)  HR: 62 (23 @ 00:00)  BP: 118/57 (23 @ 00:00)  RR: 18 (23 @ 00:00)  SpO2: 98% (23 @ 00:00)  Mode: AC/ CMV (Assist Control/ Continuous Mandatory Ventilation), RR (machine): 10, TV (machine): 450, FiO2: 40, PEEP: 5, ITime: 1, MAP: 11, PIP: 25    Diet, NPO      Fluids:     I & O's:    02-15-23 @ 07:01  -  23 @ 07:00  --------------------------------------------------------  IN:    Dexmedetomidine: 443.4 mL    IV PiggyBack: 300 mL    IV PiggyBack: 100 mL    Lactated Ringers: 2875 mL    Lactated Ringers Bolus: 1750 mL    Norepinephrine: 170 mL    Norepinephrine: 114.7 mL  Total IN: 5753.1 mL    OUT:    Colostomy (mL): 70 mL    Indwelling Catheter - Urethral (mL): 1270 mL    Nasogastric/Oral tube (mL): 1000 mL    VAC (Vacuum Assisted Closure) System (mL): 1925 mL  Total OUT: 4265 mL    Total NET: 1488.1 mL           PHYSICAL EXAM:   intubated, equal chest rise  b/l breath sounds  sedated  abthera vac in place  NG tube on wall suction  rodriguez in place    MEDICATIONS  (STANDING):  aspirin Suppository 300 milliGRAM(s) Rectal <User Schedule>  benzocaine 20% Spray 1 Spray(s) Topical once  chlorhexidine 0.12% Liquid 15 milliLiter(s) Oral Mucosa every 12 hours  chlorhexidine 2% Cloths 1 Application(s) Topical <User Schedule>  dexMEDEtomidine Infusion 0.05 MICROgram(s)/kG/Hr (1.03 mL/Hr) IV Continuous <Continuous>  heparin   Injectable 5000 Unit(s) SubCutaneous every 8 hours  lactated ringers. 1000 milliLiter(s) (125 mL/Hr) IV Continuous <Continuous>  norepinephrine Infusion 0.05 MICROgram(s)/kG/Min (3.85 mL/Hr) IV Continuous <Continuous>  pantoprazole  Injectable 40 milliGRAM(s) IV Push every 24 hours  potassium chloride  20 mEq/100 mL IVPB 20 milliEquivalent(s) IV Intermittent once    MEDICATIONS  (PRN):  fentaNYL    Injectable 25 MICROGram(s) IV Push every 4 hours PRN Moderate Pain (4 - 6)  ondansetron Injectable 4 milliGRAM(s) IV Push every 6 hours PRN Nausea and/or Vomiting      DVT PROPHYLAXIS: heparin   Injectable 5000 Unit(s) SubCutaneous every 8 hours    GI PROPHYLAXIS: pantoprazole  Injectable 40 milliGRAM(s) IV Push every 24 hours    ANTICOAGULATION:   ANTIBIOTICS:            LAB/STUDIES:  Labs:  CAPILLARY BLOOD GLUCOSE      POCT Blood Glucose.: 94 mg/dL (2023 23:53)  POCT Blood Glucose.: 92 mg/dL (2023 18:08)  POCT Blood Glucose.: 85 mg/dL (2023 08:33)                          9.0    6.68  )-----------( 301      ( 2023 19:11 )             28.5             136  |  106  |  14  ----------------------------<  93  3.9   |  22  |  0.7      Calcium, Total Serum: 7.1 mg/dL (23 @ 19:11)      LFTs:     Blood Gas Arterial, Lactate: 1.00 mmol/L (23 @ 04:30)  Blood Gas Arterial, Lactate: 1.20 mmol/L (02-15-23 @ 03:24)  Lactate, Blood: 4.1 mmol/L (23 @ 20:37)  Blood Gas Arterial, Lactate: 1.90 mmol/L (23 @ 20:25)  Lactate, Blood: 3.8 mmol/L (23 @ 12:26)  Lactate, Blood: 3.6 mmol/L (23 @ 10:28)  Blood Gas Arterial, Lactate: 2.60 mmol/L (23 @ 09:25)  Blood Gas Arterial, Lactate: 1.50 mmol/L (23 @ 06:29)    ABG - ( 2023 04:30 )  pH: 7.46  /  pCO2: 31    /  pO2: 89    / HCO3: 22    / Base Excess: -0.9  /  SaO2: 99.3            ABG - ( 15 Feb 2023 03:24 )  pH: 7.41  /  pCO2: 27    /  pO2: 87    / HCO3: 17    / Base Excess: -6.0  /  SaO2: 98.1            ABG - ( 2023 20:25 )  pH: 7.45  /  pCO2: 25    /  pO2: 93    / HCO3: 17    / Base Excess: -5.0  /  SaO2: 99.2              Coags:     13.90  ----< 1.21    ( 15 Feb 2023 20:25 )     28.8            Serum Pro-Brain Natriuretic Peptide: 822 pg/mL (23 @ 06:30)  Serum Pro-Brain Natriuretic Peptide: 950 pg/mL (23 @ 22:16)      Urinalysis Basic - ( 15 Feb 2023 10:30 )    Color: Yellow / Appearance: Slightly Turbid / S.037 / pH: x  Gluc: x / Ketone: Small  / Bili: Negative / Urobili: <2 mg/dL   Blood: x / Protein: 30 mg/dL / Nitrite: Negative   Leuk Esterase: Negative / RBC: 8 /HPF / WBC 5 /HPF   Sq Epi: x / Non Sq Epi: 11 /HPF / Bacteria: Negative        Culture - Sputum (collected 15 Feb 2023 15:34)  Source: ET Tube ET Tube  Gram Stain (2023 09:32):    No polymorphonuclear leukocytes per low power field    No Squamous epithelial cells per low power field    Rare Gram Negative Rods seen per oil power field  Preliminary Report (2023 23:03):    Moderate Klebsiella pneumoniae    Culture - Blood (collected 15 Feb 2023 13:59)  Source: .Blood Blood  Preliminary Report (2023 22:02):    No growth to date.    Culture - Body Fluid with Gram Stain (collected 2023 02:38)  Source: .Body Fluid peritoneal  Gram Stain (2023 18:34):    No polymorphonuclear cells seen    No organisms seen    by cytocentrifuge  Preliminary Report (15 Feb 2023 13:36):    No growth to date.

## 2023-02-17 NOTE — PROGRESS NOTE ADULT - SUBJECTIVE AND OBJECTIVE BOX
RUBIO HUGHES  [id number]  82y Female    Indication for ICU admission: open abdomen s/p bowel resection for SBO  Admit Date: 2/9  ICU Date:  2/14  OR Date: 2/14, 2/16    No Known Allergies    PAST MEDICAL & SURGICAL HISTORY:  Atrial fibrillation  Hypertension  CVA (cerebrovascular accident)  HLD (hyperlipidemia)  GERD (gastroesophageal reflux disease)  History of cholecystectomy  History of hysterectomy  History of bladder suspension procedure      Home Medications:      24HRS EVENT:  Night  -ETT positive klebsiella   -echo pending   -levo 0.04  -RTOR plan pending    2/16  Day  - gram neg rods in BAL  - nicom 10%, weaning levo  - echo pending   - repeat CBC 8.3>8.0  - RTOR today    REVIEW OF SYSTEMS    [ ] A ten-point review of systems was otherwise negative except as noted.  [ x] Due to altered mental status/intubation, subjective information were not able to be obtained from the patient. History was obtained, to the extent possible, from review of the chart and collateral sources of information    ********************************************************************************************************   RUBIO HUGHES  [id number]  82y Female    Indication for ICU admission: open abdomen s/p bowel resection for SBO  Admit Date: 2/9  ICU Date:  2/14  OR Date: 2/14, 2/16    No Known Allergies    PAST MEDICAL & SURGICAL HISTORY:  Atrial fibrillation  Hypertension  CVA (cerebrovascular accident)  HLD (hyperlipidemia)  GERD (gastroesophageal reflux disease)  History of cholecystectomy  History of hysterectomy  History of bladder suspension procedure      Home Medications:      24HRS EVENT:  Night  -ETT positive klebsiella   -echo pending   -levo 0.04  -RTOR plan pending  -no repletions required    2/16  Day  - gram neg rods in BAL  - nicom 10%, weaning levo  - echo pending   - repeat CBC 8.3>8.0  - RTOR today    REVIEW OF SYSTEMS    [ ] A ten-point review of systems was otherwise negative except as noted.  [ x] Due to altered mental status/intubation, subjective information were not able to be obtained from the patient. History was obtained, to the extent possible, from review of the chart and collateral sources of information    ********************************************************************************************************   RUBIO HUGHES  [id number]  82y Female    Indication for ICU admission: open abdomen s/p bowel resection for SBO  Admit Date: 2/9  ICU Date:  2/14  OR Date: 2/14, 2/16    No Known Allergies    PAST MEDICAL & SURGICAL HISTORY:  Atrial fibrillation  Hypertension  CVA (cerebrovascular accident)  HLD (hyperlipidemia)  GERD (gastroesophageal reflux disease)  History of cholecystectomy  History of hysterectomy  History of bladder suspension procedure    Home Medications:      24HRS EVENT:  Night  -ETT positive klebsiella   -echo pending   -levo 0.04  -RTOR plan pending  -no repletions required    2/16  Day  - gram neg rods in BAL  - nicom 10%, weaning levo  - echo pending   - repeat CBC 8.3>8.0  - RTOR today    REVIEW OF SYSTEMS    [ ] A ten-point review of systems was otherwise negative except as noted.  [ x] Due to altered mental status/intubation, subjective information were not able to be obtained from the patient. History was obtained, to the extent possible, from review of the chart and collateral sources of information    ********************************************************************************************************  Diet, NPO (02-13-23 @ 09:46)    CURRENT MEDS:  Neurologic Medications  aspirin Suppository 300 milliGRAM(s) Rectal <User Schedule>  dexMEDEtomidine Infusion 0.05 MICROgram(s)/kG/Hr IV Continuous <Continuous>  fentaNYL    Injectable 25 MICROGram(s) IV Push every 4 hours PRN Moderate Pain (4 - 6)  ondansetron Injectable 4 milliGRAM(s) IV Push every 6 hours PRN Nausea and/or Vomiting    Respiratory Medications    Cardiovascular Medications  norepinephrine Infusion 0.05 MICROgram(s)/kG/Min IV Continuous <Continuous>    Gastrointestinal Medications  lactated ringers. 1000 milliLiter(s) IV Continuous <Continuous>  pantoprazole  Injectable 40 milliGRAM(s) IV Push every 24 hours  Parenteral Nutrition - Adult 1 Each TPN Continuous <Continuous>    Genitourinary Medications    Hematologic/Oncologic Medications  heparin   Injectable 5000 Unit(s) SubCutaneous every 8 hours    Antimicrobial/Immunologic Medications    Endocrine/Metabolic Medications    Topical/Other Medications  benzocaine 20% Spray 1 Spray(s) Topical once  chlorhexidine 0.12% Liquid 15 milliLiter(s) Oral Mucosa every 12 hours  chlorhexidine 2% Cloths 1 Application(s) Topical <User Schedule>    ICU Vital Signs Last 24 Hrs  T(C): 37.3 (17 Feb 2023 08:00), Max: 37.3 (17 Feb 2023 08:00)  T(F): 99.2 (17 Feb 2023 08:00), Max: 99.2 (17 Feb 2023 08:00)  HR: 58 (17 Feb 2023 14:00) (58 - 110)  BP: 160/77 (17 Feb 2023 12:51) (97/55 - 160/77)  BP(mean): 88 (17 Feb 2023 12:00) (74 - 96)  ABP: 140/42 (17 Feb 2023 14:00) (90/33 - 166/53)  ABP(mean): 69 (17 Feb 2023 14:00) (49 - 86)  RR: 22 (17 Feb 2023 14:00) (12 - 24)  SpO2: 98% (17 Feb 2023 14:00) (91% - 100%)    O2 Parameters below as of 17 Feb 2023 15:00  Patient On (Oxygen Delivery Method): ventilator    Mode: AC/ CMV (Assist Control/ Continuous Mandatory Ventilation)  RR (machine): 10  TV (machine): 450  FiO2: 40  PEEP: 5  ITime: 1  MAP: 11  PIP: 24    ABG - ( 17 Feb 2023 03:49 )  pH, Arterial: 7.43  pH, Blood: x     /  pCO2: 31    /  pO2: 120   / HCO3: 21    / Base Excess: -3.1  /  SaO2: 99.0      I&O's Summary    16 Feb 2023 07:01  -  17 Feb 2023 07:00  --------------------------------------------------------  IN: 3761.8 mL / OUT: 2850 mL / NET: 911.8 mL    17 Feb 2023 07:01  -  17 Feb 2023 15:59  --------------------------------------------------------  IN: 1967.5 mL / OUT: 525 mL / NET: 1442.5 mL      I&O's Detail    16 Feb 2023 07:01  -  17 Feb 2023 07:00  --------------------------------------------------------  IN:    Dexmedetomidine: 567.6 mL    IV PiggyBack: 100 mL    IV PiggyBack: 100 mL    Lactated Ringers: 2875 mL    Norepinephrine: 45.7 mL    Norepinephrine: 73.5 mL  Total IN: 3761.8 mL    OUT:    Colostomy (mL): 25 mL    Indwelling Catheter - Urethral (mL): 1425 mL    Nasogastric/Oral tube (mL): 700 mL    VAC (Vacuum Assisted Closure) System (mL): 700 mL  Total OUT: 2850 mL    Total NET: 911.8 mL      17 Feb 2023 07:01  -  17 Feb 2023 15:59  --------------------------------------------------------  IN:    Dexmedetomidine: 192.7 mL    Lactated Ringers: 1000 mL    Norepinephrine: 24.8 mL    Sodium Chloride 0.9% Bolus: 750 mL  Total IN: 1967.5 mL    OUT:    Indwelling Catheter - Urethral (mL): 300 mL    VAC (Vacuum Assisted Closure) System (mL): 225 mL  Total OUT: 525 mL    Total NET: 1442.5 mL    PHYSICAL EXAM:  General: NAD, awake, comfortable   HEENT: NCAT, intubated   Cardiac: No peripheral cyanosis or pallor, extremities well perfused   Respiratory: Equal chest rise bilaterally, ventilator settings 450/10/40/5  Abdomen: Soft, open abdomen with abthera vac in place   Musculoskeletal: moves all extremities spontaneously   Neuro: Unable to assess  Vascular: Pulses 2+ throughout, extremities well perfused  Skin: Warm/dry, normal color, no jaundice  Incision/wound: abthera vac in place, 700cc 2/16 day and overnight

## 2023-02-18 NOTE — PROGRESS NOTE ADULT - SUBJECTIVE AND OBJECTIVE BOX
GENERAL SURGERY PROGRESS NOTE    Admission: Other specified disorders of intestine    24 Hour Events:  No acute events  Remains on vent.  Pressors downtrending, UOP adequate.   TPN started overnight    Vitals:  T(F): 96.8 (02-18-23 @ 08:00), Max: 99.6 (02-17-23 @ 12:00)  HR: 61 (02-18-23 @ 08:00)  BP: 134/57 (02-18-23 @ 08:00)  RR: 20 (02-18-23 @ 08:00)  SpO2: 99% (02-18-23 @ 08:00)  Mode: AC/ CMV (Assist Control/ Continuous Mandatory Ventilation), RR (machine): 10, TV (machine): 450, FiO2: 40, PEEP: 5, ITime: 1, MAP: 10, PIP: 23    Diet, NPO    Fluids:     I & O's:    02-17-23 @ 07:01  -  02-18-23 @ 07:00  --------------------------------------------------------  IN:    Dexmedetomidine: 603.1 mL    IV PiggyBack: 416.5 mL    IV PiggyBack: 100 mL    IV PiggyBack: 50 mL    Lactated Ringers: 3000 mL    Norepinephrine: 53.3 mL    Sodium Chloride 0.9% Bolus: 750 mL    TPN (Total Parenteral Nutrition): 780 mL  Total IN: 5752.9 mL    OUT:    Colostomy (mL): 7.5 mL    Indwelling Catheter - Urethral (mL): 1025 mL    Nasogastric/Oral tube (mL): 600 mL    VAC (Vacuum Assisted Closure) System (mL): 1125 mL  Total OUT: 2757.5 mL    Total NET: 2995.4 mL    PHYSICAL EXAM:  General: sedated, intuabed  Cardiac: RRR, S1/S2 identified, nmrg  Respiratory: on ventilator  Abdomen: Soft, non-distended  Musculoskeletal: FROM in b/l UE and LE  Neuro: Sensation grossly intact and equal throughout, no focal deficits  Skin: Warm/dry, normal color, no jaundice  Incision/wound: Abthera in place    MEDICATIONS  (STANDING):  aspirin Suppository 300 milliGRAM(s) Rectal <User Schedule>  benzocaine 20% Spray 1 Spray(s) Topical once  chlorhexidine 0.12% Liquid 15 milliLiter(s) Oral Mucosa every 12 hours  chlorhexidine 2% Cloths 1 Application(s) Topical <User Schedule>  dexMEDEtomidine Infusion 0.05 MICROgram(s)/kG/Hr (1.03 mL/Hr) IV Continuous <Continuous>  heparin   Injectable 5000 Unit(s) SubCutaneous every 8 hours  lactated ringers. 1000 milliLiter(s) (125 mL/Hr) IV Continuous <Continuous>  norepinephrine Infusion 0.05 MICROgram(s)/kG/Min (3.85 mL/Hr) IV Continuous <Continuous>  pantoprazole  Injectable 40 milliGRAM(s) IV Push every 24 hours  Parenteral Nutrition - Adult 1 Each (65 mL/Hr) TPN Continuous <Continuous>  potassium phosphate IVPB 30 milliMole(s) IV Intermittent once    MEDICATIONS  (PRN):  fentaNYL    Injectable 25 MICROGram(s) IV Push every 4 hours PRN Moderate Pain (4 - 6)  ondansetron Injectable 4 milliGRAM(s) IV Push every 6 hours PRN Nausea and/or Vomiting      DVT PROPHYLAXIS: heparin   Injectable 5000 Unit(s) SubCutaneous every 8 hours    GI PROPHYLAXIS: pantoprazole  Injectable 40 milliGRAM(s) IV Push every 24 hours    ANTICOAGULATION:   ANTIBIOTICS:      LAB/STUDIES:  Labs:  CAPILLARY BLOOD GLUCOSE      POCT Blood Glucose.: 82 mg/dL (17 Feb 2023 17:52)  POCT Blood Glucose.: 82 mg/dL (17 Feb 2023 13:28)                          8.3    11.08 )-----------( 367      ( 17 Feb 2023 21:54 )             26.2       Auto Neutrophil %: 83.0 % (02-17-23 @ 21:54)  Auto Immature Granulocyte %: 0.5 % (02-17-23 @ 21:54)    02-18    138  |  109  |  16  ----------------------------<  165<H>  3.7   |  24  |  0.5<L>      Calcium, Total Serum: 6.7 mg/dL (02-18-23 @ 05:50)      LFTs:     Blood Gas Arterial, Lactate: 0.90 mmol/L (02-18-23 @ 04:14)  Blood Gas Arterial, Lactate: 0.80 mmol/L (02-17-23 @ 03:49)  Blood Gas Arterial, Lactate: 1.00 mmol/L (02-16-23 @ 04:30)    ABG - ( 18 Feb 2023 04:14 )  pH: 7.44  /  pCO2: 32    /  pO2: 101   / HCO3: 22    / Base Excess: -1.6  /  SaO2: 98.7            ABG - ( 17 Feb 2023 03:49 )  pH: 7.43  /  pCO2: 31    /  pO2: 120   / HCO3: 21    / Base Excess: -3.1  /  SaO2: 99.0            ABG - ( 16 Feb 2023 04:30 )  pH: 7.46  /  pCO2: 31    /  pO2: 89    / HCO3: 22    / Base Excess: -0.9  /  SaO2: 99.3              Coags:        Serum Pro-Brain Natriuretic Peptide: 822 pg/mL (02-14-23 @ 06:30)  Serum Pro-Brain Natriuretic Peptide: 950 pg/mL (02-09-23 @ 22:16)          Culture - Sputum (collected 15 Feb 2023 15:34)  Source: ET Tube ET Tube  Gram Stain (16 Feb 2023 09:32):    No polymorphonuclear leukocytes per low power field    No Squamous epithelial cells per low power field    Rare Gram Negative Rods seen per oil power field  Final Report (17 Feb 2023 23:40):    Moderate Klebsiella pneumoniae (Carbapenem Resistant)    Normal Respiratory Latricia absent  Organism: Klepne MDRO  Klepne MDRO (17 Feb 2023 23:40)  Organism: Klepne MDRO (17 Feb 2023 23:40)  Organism: Klepne MDRO (17 Feb 2023 23:40)    Culture - Blood (collected 15 Feb 2023 13:59)  Source: .Blood Blood  Preliminary Report (16 Feb 2023 22:02):    No growth to date.

## 2023-02-18 NOTE — PROGRESS NOTE ADULT - SUBJECTIVE AND OBJECTIVE BOX
RUBIO HUGHES  [id number]  82y Female    Indication for ICU admission: open abdomen s/p bowel resection for SBO  Admit Date: 2/9  ICU Date:  2/14  OR Date: 2/14, 2/16    No Known Allergies    PAST MEDICAL & SURGICAL HISTORY:  Atrial fibrillation  Hypertension  CVA (cerebrovascular accident)  HLD (hyperlipidemia)  GERD (gastroesophageal reflux disease)  History of cholecystectomy  History of hysterectomy  History of bladder suspension procedure      Home Medications:      24HRS EVENT:  2/17   Night  Abdomen tender, distended on exam   Wound vac functioning   mg, k, and phos repleted     DAY  -   -levo 0.04, NICOM ordered-->500 cc bolus   -start TPN, f/u TPN consult   -advance ETT 1cm     REVIEW OF SYSTEMS    [ ] A ten-point review of systems was otherwise negative except as noted.  [ x] Due to altered mental status/intubation, subjective information were not able to be obtained from the patient. History was obtained, to the extent possible, from review of the chart and collateral sources of information    ********************************************************************************************************   RUBIO HUGHES  [id number]  82y Female    Indication for ICU admission: open abdomen s/p bowel resection for SBO  Admit Date: 2/9  ICU Date:  2/14  OR Date: 2/14, 2/16    No Known Allergies    PAST MEDICAL & SURGICAL HISTORY:  Atrial fibrillation  Hypertension  CVA (cerebrovascular accident)  HLD (hyperlipidemia)  GERD (gastroesophageal reflux disease)  History of cholecystectomy  History of hysterectomy  History of bladder suspension procedure      Home Medications:      24HRS EVENT:  2/17   Night  Abdomen tender, distended on exam   Wound vac functioning   mg, k, and phos repleted     DAY  -   -levo 0.04, NICOM ordered-->500 cc bolus   -start TPN, f/u TPN consult   -advance ETT 1cm     REVIEW OF SYSTEMS    [ ] A ten-point review of systems was otherwise negative except as noted.  [ x] Due to altered mental status/intubation, subjective information were not able to be obtained from the patient. History was obtained, to the extent possible, from review of the chart and collateral sources of information    ********************************************************************************************************  Daily     Daily     Diet, NPO (02-13-23 @ 09:46)      CURRENT MEDS:  Neurologic Medications  aspirin Suppository 300 milliGRAM(s) Rectal <User Schedule>  dexMEDEtomidine Infusion 0.05 MICROgram(s)/kG/Hr IV Continuous <Continuous>  fentaNYL    Injectable 25 MICROGram(s) IV Push every 4 hours PRN Moderate Pain (4 - 6)  ondansetron Injectable 4 milliGRAM(s) IV Push every 6 hours PRN Nausea and/or Vomiting    Respiratory Medications    Cardiovascular Medications  norepinephrine Infusion 0.05 MICROgram(s)/kG/Min IV Continuous <Continuous>    Gastrointestinal Medications  lactated ringers. 1000 milliLiter(s) IV Continuous <Continuous>  pantoprazole  Injectable 40 milliGRAM(s) IV Push every 24 hours  Parenteral Nutrition - Adult 1 Each TPN Continuous <Continuous>    Genitourinary Medications    Hematologic/Oncologic Medications  heparin   Injectable 5000 Unit(s) SubCutaneous every 8 hours    Antimicrobial/Immunologic Medications    Endocrine/Metabolic Medications    Topical/Other Medications  benzocaine 20% Spray 1 Spray(s) Topical once  chlorhexidine 0.12% Liquid 15 milliLiter(s) Oral Mucosa every 12 hours  chlorhexidine 2% Cloths 1 Application(s) Topical <User Schedule>      ICU Vital Signs Last 24 Hrs  T(C): 36 (18 Feb 2023 08:00), Max: 37.3 (17 Feb 2023 16:00)  T(F): 96.8 (18 Feb 2023 08:00), Max: 99.2 (17 Feb 2023 16:00)  HR: 57 (18 Feb 2023 10:00) (56 - 110)  BP: 119/52 (18 Feb 2023 10:00) (109/53 - 160/77)  BP(mean): 75 (18 Feb 2023 10:00) (75 - 97)  ABP: 134/37 (18 Feb 2023 10:00) (67/52 - 173/45)  ABP(mean): 63 (18 Feb 2023 10:00) (60 - 81)  RR: 21 (18 Feb 2023 10:00) (16 - 22)  SpO2: 99% (18 Feb 2023 10:00) (96% - 100%)    O2 Parameters below as of 18 Feb 2023 11:00  Patient On (Oxygen Delivery Method): ventilator    O2 Concentration (%): 40          Mode: AC/ CMV (Assist Control/ Continuous Mandatory Ventilation)  RR (machine): 10  TV (machine): 450  FiO2: 40  PEEP: 5  ITime: 1  MAP: 11  PIP: 29    ABG - ( 18 Feb 2023 04:14 )  pH, Arterial: 7.44  pH, Blood: x     /  pCO2: 32    /  pO2: 101   / HCO3: 22    / Base Excess: -1.6  /  SaO2: 98.7                I&O's Summary    17 Feb 2023 07:01  -  18 Feb 2023 07:00  --------------------------------------------------------  IN: 5752.9 mL / OUT: 2757.5 mL / NET: 2995.4 mL    18 Feb 2023 07:01  -  18 Feb 2023 12:30  --------------------------------------------------------  IN: 798.6 mL / OUT: 475 mL / NET: 323.6 mL      I&O's Detail    17 Feb 2023 07:01  -  18 Feb 2023 07:00  --------------------------------------------------------  IN:    Dexmedetomidine: 603.1 mL    IV PiggyBack: 416.5 mL    IV PiggyBack: 100 mL    IV PiggyBack: 50 mL    Lactated Ringers: 3000 mL    Norepinephrine: 53.3 mL    Sodium Chloride 0.9% Bolus: 750 mL    TPN (Total Parenteral Nutrition): 780 mL  Total IN: 5752.9 mL    OUT:    Colostomy (mL): 7.5 mL    Indwelling Catheter - Urethral (mL): 1025 mL    Nasogastric/Oral tube (mL): 600 mL    VAC (Vacuum Assisted Closure) System (mL): 1125 mL  Total OUT: 2757.5 mL    Total NET: 2995.4 mL      18 Feb 2023 07:01  -  18 Feb 2023 12:30  --------------------------------------------------------  IN:    Dexmedetomidine: 106.8 mL    Lactated Ringers: 375 mL    Lactated Ringers: 50 mL    Norepinephrine: 6.8 mL    TPN (Total Parenteral Nutrition): 260 mL  Total IN: 798.6 mL    OUT:    Indwelling Catheter - Urethral (mL): 175 mL    Nasogastric/Oral tube (mL): 300 mL  Total OUT: 475 mL    Total NET: 323.6 mL          PHYSICAL EXAM:  General: NAD, awake, comfortable, following commands   HEENT: NCAT, intubated   Cardiac: No peripheral cyanosis or pallor, extremities well perfused   Respiratory: Equal chest rise bilaterally, ventilator settings 450/10/40/5  Abdomen: Soft, open abdomen with abthera vac in place   Musculoskeletal: moves all extremities spontaneously   Vascular: Pulses 2+ throughout, extremities well perfused  Skin: Warm/dry, normal color, no jaundice  Incision/wound: abthera vac in place      CXR:     LABS:  CAPILLARY BLOOD GLUCOSE      POCT Blood Glucose.: 82 mg/dL (17 Feb 2023 17:52)  POCT Blood Glucose.: 82 mg/dL (17 Feb 2023 13:28)                          8.3    11.08 )-----------( 367      ( 17 Feb 2023 21:54 )             26.2       02-18    138  |  109  |  16  ----------------------------<  165<H>  3.7   |  24  |  0.5<L>    Ca    6.7<L>      18 Feb 2023 05:50  Phos  2.8     02-18  Mg     1.9     02-18                  Culture - Sputum (collected 15 Feb 2023 15:34)  Source: ET Tube ET Tube  Gram Stain (16 Feb 2023 09:32):    No polymorphonuclear leukocytes per low power field    No Squamous epithelial cells per low power field    Rare Gram Negative Rods seen per oil power field  Final Report (17 Feb 2023 23:40):    Moderate Klebsiella pneumoniae (Carbapenem Resistant)    Normal Respiratory Latricia absent  Organism: Klepne MDRO  Klepne MDRO (17 Feb 2023 23:40)  Organism: Klepne MDRO (17 Feb 2023 23:40)  Organism: Klepne MDRO (17 Feb 2023 23:40)    Culture - Blood (collected 15 Feb 2023 13:59)  Source: .Blood Blood  Preliminary Report (16 Feb 2023 22:02):    No growth to date.

## 2023-02-18 NOTE — PROGRESS NOTE ADULT - ASSESSMENT
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    Plan:  #Small Bowel Obstruction  # s/p second look laparotomy   - Care per SICU   - wean vent/pressors as tolerated   - Ostomy w/ flatus and stool   - f/u NG tube output   - f/u abthera output   - f/u urine output   - TPN   - Planning RTOR Brittany 2/19    #Other  Diet: NPO with IVF, TPN  Nutrition c/s pending  GI ppx: pantoprazole  DVT ppx: SQH 5000 q8  Dispo: pending    Trauma/ACS  SPECTRA [id number]

## 2023-02-19 NOTE — PROCEDURE NOTE - NSCVLLINESIZE_VASC_A_CORE
WOUND CHECK    10/05/2017    Omero Holland, : 1944    WOUND CHECK    B/P:(Sitting)  (Standing)91/76      Pulse: 103    Patient has fever: [] Temperature if indicated: 97.9    Wound Location:  Left infraclavicular    Dressing Removed [x]        Old Dressing Appearance:  Clean, dry [x]                 Old, bloody drainage []                            Moist, serous drainage []                Moist, thick yellow/green drainage []       Wound Appearance: Redness []                  Drainage []                  Culture obtained []        Color: Clear     Consistency: none     Amount: none         Gloves used, wound cleansed with sterile 4x4 and peroxide [x]       MD notified [] MD orders:     Antibiotic started []  If checked, type   Other:     Appointment for follow-up scheduled for 3 months post procedure []    Future Appointments  Date Time Provider Department Center   1/15/2018 3:00 PM Evan Leroy DO [facility] None           Abdifatah Rico MA, 10/05/17      MD Signature:______________________________ Completed By/Date:          4.45cm

## 2023-02-19 NOTE — PRE-OP CHECKLIST - NOTHING BY MOUTH SINCE
16-Feb-2023 00:00
08-Feb-2023 01:00
19-Feb-2023 00:00
Spine appears normal, range of motion is not limited, no muscle or joint tenderness

## 2023-02-19 NOTE — CHART NOTE - NSCHARTNOTEFT_GEN_A_CORE
Patient is well known to the SICU team.  Today she was taken to the OR for abdominal washout and exploration with  possible closure of open abdominal wound.  Procedure: Exploratory laparotomy, Abdominal washout, attempted closure of abdominal wound- superior portion fascia partially closed with sutures.  attempts to close lower portion resulted in elevated peak airway pressures. Application of abthera vac dressing  GETA- time <1 hour   ml  ( all pre-op drips continued through out the procedure)  EBL minimal  UO 50 ml (emptied 900 ml upon arrival in the OR)    Patient bypassed PACU, was not reversed and was transported back to SICU in stable condition    Currently she is waking form anesthesia, moving arms and head.  Remains intubated, sedated on Precedex  O2 saturation is 98% when on 40% FiO2  -80 HR 80's  Breath sounds clear A-P  Abdomen- soft, with abthera dressing along midline wound- functioning well.  Colostomy is pink new appliance placed- currently no gas or stool  Calves soft, non-tender  Skin is warm and pink    Assessment plan  Stable post op   continue current sedation regiment  Monitor peak airway pressures CXR pending  Wean pressors as tolerated.

## 2023-02-19 NOTE — PROGRESS NOTE ADULT - SUBJECTIVE AND OBJECTIVE BOX
GENERAL SURGERY PROGRESS NOTE    Patient: RUBIO HUGHES , 82y ([birth year])Female   MRN: [id number]  Location: [address]  Visit: 02-10-23 Inpatient  Date: 02-19-23 @ 10:45    Hospital Day #: 12  Post-Op Day #: 6    Events of past 24 hours:  No acute events  Remains on vent.  Pressors downtrending, UOP adequate.   Fetroja started per ID  New A-line put in     PAST MEDICAL & SURGICAL HISTORY:  Atrial fibrillation      Hypertension      CVA (cerebrovascular accident)      HLD (hyperlipidemia)      GERD (gastroesophageal reflux disease)      History of cholecystectomy      History of hysterectomy      History of bladder suspension procedure          Vitals:   T(F): 99 (02-19-23 @ 08:00), Max: 99 (02-19-23 @ 00:00)  HR: 62 (02-19-23 @ 10:00)  BP: 122/58 (02-19-23 @ 10:00)  RR: 18 (02-19-23 @ 10:00)  SpO2: 99% (02-19-23 @ 10:00)  Mode: AC/ CMV (Assist Control/ Continuous Mandatory Ventilation), RR (machine): 10, TV (machine): 450, FiO2: 40, PEEP: 5, ITime: 1, MAP: 11, PIP: 26    Diet, NPO      Fluids:     I & O's:    02-18-23 @ 07:01  -  02-19-23 @ 07:00  --------------------------------------------------------  IN:    Dexmedetomidine: 631.4 mL    Lactated Ringers: 375 mL    Lactated Ringers: 200 mL    Norepinephrine: 13.7 mL    Norepinephrine: 40.7 mL    TPN (Total Parenteral Nutrition): 1495 mL  Total IN: 2755.8 mL    OUT:    Indwelling Catheter - Urethral (mL): 960 mL    Nasogastric/Oral tube (mL): 400 mL    VAC (Vacuum Assisted Closure) System (mL): 1100 mL  Total OUT: 2460 mL    Total NET: 295.8 mL    PHYSICAL EXAM:  General: sedated, intuabed  Cardiac: RRR, S1/S2 identified, nmrg  Respiratory: on ventilator  Abdomen: Soft, non-distended  Musculoskeletal: FROM in b/l UE and LE  Neuro: Sensation grossly intact and equal throughout, no focal deficits  Skin: Warm/dry, normal color, no jaundice  Incision/wound: Abthera in place    MEDICATIONS  (STANDING):  aspirin Suppository 300 milliGRAM(s) Rectal <User Schedule>  benzocaine 20% Spray 1 Spray(s) Topical once  cefiderocol IVPB 2000 milliGRAM(s) IV Intermittent every 8 hours  chlorhexidine 0.12% Liquid 15 milliLiter(s) Oral Mucosa every 12 hours  chlorhexidine 2% Cloths 1 Application(s) Topical <User Schedule>  dexMEDEtomidine Infusion 0.05 MICROgram(s)/kG/Hr (1.03 mL/Hr) IV Continuous <Continuous>  heparin   Injectable 5000 Unit(s) SubCutaneous every 8 hours  norepinephrine Infusion 0.05 MICROgram(s)/kG/Min (3.85 mL/Hr) IV Continuous <Continuous>  pantoprazole  Injectable 40 milliGRAM(s) IV Push every 24 hours  Parenteral Nutrition - Adult 1 Each (65 mL/Hr) TPN Continuous <Continuous>    MEDICATIONS  (PRN):  fentaNYL    Injectable 25 MICROGram(s) IV Push every 4 hours PRN Moderate Pain (4 - 6)  ondansetron Injectable 4 milliGRAM(s) IV Push every 6 hours PRN Nausea and/or Vomiting      DVT PROPHYLAXIS: heparin   Injectable 5000 Unit(s) SubCutaneous every 8 hours    GI PROPHYLAXIS: pantoprazole  Injectable 40 milliGRAM(s) IV Push every 24 hours    ANTICOAGULATION:   ANTIBIOTICS:  cefiderocol IVPB 2000 milliGRAM(s)    LAB/STUDIES:  Labs:  CAPILLARY BLOOD GLUCOSE      POCT Blood Glucose.: 136 mg/dL (19 Feb 2023 05:37)  POCT Blood Glucose.: 158 mg/dL (18 Feb 2023 18:39)                          7.5    10.53 )-----------( 331      ( 19 Feb 2023 08:11 )             23.8         02-19    139  |  110  |  22<H>  ----------------------------<  141<H>  3.9   |  23  |  0.5<L>      Magnesium, Serum: 2.0 mg/dL (02-19-23 @ 08:11)      LFTs:     Blood Gas Arterial, Lactate: 1.30 mmol/L (02-19-23 @ 03:35)  Blood Gas Arterial, Lactate: 0.90 mmol/L (02-18-23 @ 04:14)  Blood Gas Arterial, Lactate: 0.80 mmol/L (02-17-23 @ 03:49)    ABG - ( 19 Feb 2023 03:35 )  pH: 7.46  /  pCO2: 31    /  pO2: 115   / HCO3: 22    / Base Excess: -1.3  /  SaO2: 99.3            ABG - ( 18 Feb 2023 04:14 )  pH: 7.44  /  pCO2: 32    /  pO2: 101   / HCO3: 22    / Base Excess: -1.6  /  SaO2: 98.7            ABG - ( 17 Feb 2023 03:49 )  pH: 7.43  /  pCO2: 31    /  pO2: 120   / HCO3: 21    / Base Excess: -3.1  /  SaO2: 99.0              Coags:     TNP    ----< TNP     ( 19 Feb 2023 05:00 )     TNP             Serum Pro-Brain Natriuretic Peptide: 822 pg/mL (02-14-23 @ 06:30)  Serum Pro-Brain Natriuretic Peptide: 950 pg/mL (02-09-23 @ 22:16)        ASSESSMENT:  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    Plan:  #Small Bowel Obstruction  - RTOR for possible closure, washout 2/19 Sunday   # s/p second look laparotomy   - Care per SICU   - wean vent/pressors as tolerated   - Ostomy w/ flatus and stool   - f/u NG tube output   - f/u abthera output   - f/u urine output   - TPN         Trauma/ACS  SPECTRA [id number]

## 2023-02-19 NOTE — CONSULT NOTE ADULT - ASSESSMENT
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 SBO. No pneumoperitoneum.  Hosp course : SBO s/p diagnostic lap converted to exploratory laparotomy, sigmoidectomy, cecal enterotomy w/ stool expression and closure, descending colostomy, and abthera placement.    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 SBO. No pneumoperitoneum.  Hosp course : SBO s/p diagnostic lap converted to exploratory laparotomy, sigmoidectomy, cecal enterotomy w/ stool expression and closure, descending colostomy, and abthera placement.    IMPRESSION;  Possible bcterial PNA secondary to MDR Klebsiella  2/15 BAL Klebsiella    RECOMMENDATIONS;  Run sensitivities against Fetroja  Fetroja 2 gm iv q8h  Off loading to prevent pressure sores and preventive measures to avoid aspiration

## 2023-02-19 NOTE — CHART NOTE - NSCHARTNOTEFT_GEN_A_CORE
Post Operative Note  Patient: RUBIO HUGHES 82y (1940) Female   MRN: [id number]  Location: [address]  Visit: 02-10-23 Inpatient  Date: 02-19-23 @ 19:49    Procedure: S/P Exploratory laparotomy, Abdominal washout, attempted closure of abdominal wound- superior portion fascia partially closed with sutures.  Attempts to close lower portion resulted in elevated peak airway pressures. Application of Abthera vac dressing    Subjective:   Patient seen and examined at bedside  Patient on low dose Levophed at 0.03, Precedex 1.5  Donis in place, NGT in place  Patient responsive while on the vent  Abthera in place, holding suction    Objective:  Vitals: T(F): 98.6 (02-19-23 @ 16:45), Max: 99 (02-19-23 @ 00:00)  HR: 59 (02-19-23 @ 18:00)  BP: 112/53 (02-19-23 @ 18:00) (102/49 - 140/63)  RR: 22 (02-19-23 @ 14:39)  SpO2: 98% (02-19-23 @ 18:00)  Vent Settings: Mode: AC/ CMV (Assist Control/ Continuous Mandatory Ventilation), RR (machine): 10, TV (machine): 450, FiO2: 40, PEEP: 5, MAP: 10, PIP: 29    In:   02-18-23 @ 07:01  -  02-19-23 @ 07:00  --------------------------------------------------------  IN: 2755.8 mL    02-19-23 @ 07:01  -  02-19-23 @ 19:49  --------------------------------------------------------  IN: 1061.5 mL      IV Fluids: Parenteral Nutrition - Adult 1 Each (65 mL/Hr) TPN Continuous <Continuous>  Parenteral Nutrition - Adult 1 Each (65 mL/Hr) TPN Continuous <Continuous>      Out:   02-18-23 @ 07:01  -  02-19-23 @ 07:00  --------------------------------------------------------  OUT: 2460 mL    02-19-23 @ 07:01  -  02-19-23 @ 19:49  --------------------------------------------------------  OUT: 1445 mL      EBL:     Voided Urine:   02-18-23 @ 07:01  -  02-19-23 @ 07:00  --------------------------------------------------------  OUT: 2460 mL    02-19-23 @ 07:01  -  02-19-23 @ 19:49  --------------------------------------------------------  OUT: 1445 mL      Donis Catheter: yes no   Drains:   JESSICA:   02-18-23 @ 07:01  -  02-19-23 @ 07:00  --------------------------------------------------------  OUT: 1100 mL    02-19-23 @ 07:01  -  02-19-23 @ 19:49  --------------------------------------------------------  OUT: 400 mL     ,   Chest Tube:      NG Tube:   02-18-23 @ 07:01  -  02-19-23 @ 07:00  --------------------------------------------------------  OUT: 400 mL    02-19-23 @ 07:01  -  02-19-23 @ 19:49  --------------------------------------------------------  OUT: 420 mL        Physical Examination:  General Appearance: NAD, on mechanical ventilation  HEENT: EOMI, sclera non-icteric.  Heart: RRR  Lungs: CTABL  Abdomen:  Soft, nontender, distended abdomen. No rigidity, guarding, or rebound tenderness. Abthera vac in place. NGT in place  MSK/Extremities: Warm & well-perfused. Peripheral pulses intact.  Skin: Warm, dry. No jaundice.   Incisions/Wounds: Dressings in place, clean, dry and intact, no signs of infection/active bleeding/drainage    Medications: [Standing]  aspirin Suppository 300 milliGRAM(s) Rectal <User Schedule>  benzocaine 20% Spray 1 Spray(s) Topical once  cefiderocol IVPB 2000 milliGRAM(s) IV Intermittent every 8 hours  chlorhexidine 0.12% Liquid 15 milliLiter(s) Oral Mucosa every 12 hours  chlorhexidine 2% Cloths 1 Application(s) Topical <User Schedule>  dexMEDEtomidine Infusion 0.05 MICROgram(s)/kG/Hr IV Continuous <Continuous>  fentaNYL    Injectable 25 MICROGram(s) IV Push every 4 hours PRN  heparin   Injectable 5000 Unit(s) SubCutaneous every 8 hours  norepinephrine Infusion 0.05 MICROgram(s)/kG/Min IV Continuous <Continuous>  ondansetron Injectable 4 milliGRAM(s) IV Push every 6 hours PRN  pantoprazole  Injectable 40 milliGRAM(s) IV Push every 24 hours  Parenteral Nutrition - Adult 1 Each TPN Continuous <Continuous>  Parenteral Nutrition - Adult 1 Each TPN Continuous <Continuous>    Medications: [PRN]  aspirin Suppository 300 milliGRAM(s) Rectal <User Schedule>  benzocaine 20% Spray 1 Spray(s) Topical once  cefiderocol IVPB 2000 milliGRAM(s) IV Intermittent every 8 hours  chlorhexidine 0.12% Liquid 15 milliLiter(s) Oral Mucosa every 12 hours  chlorhexidine 2% Cloths 1 Application(s) Topical <User Schedule>  dexMEDEtomidine Infusion 0.05 MICROgram(s)/kG/Hr IV Continuous <Continuous>  fentaNYL    Injectable 25 MICROGram(s) IV Push every 4 hours PRN  heparin   Injectable 5000 Unit(s) SubCutaneous every 8 hours  norepinephrine Infusion 0.05 MICROgram(s)/kG/Min IV Continuous <Continuous>  ondansetron Injectable 4 milliGRAM(s) IV Push every 6 hours PRN  pantoprazole  Injectable 40 milliGRAM(s) IV Push every 24 hours  Parenteral Nutrition - Adult 1 Each TPN Continuous <Continuous>  Parenteral Nutrition - Adult 1 Each TPN Continuous <Continuous>    Labs:                        7.5    10.53 )-----------( 331      ( 19 Feb 2023 08:11 )             23.8     02-19    139  |  110  |  22<H>  ----------------------------<  141<H>  3.9   |  23  |  0.5<L>    Ca    6.8<L>      19 Feb 2023 05:00  Phos  2.2     02-19  Mg     2.0     02-19      PT/INR - ( 19 Feb 2023 05:00 )   PT: TNP sec;   INR: TNP ratio         PTT - ( 19 Feb 2023 05:00 )  PTT:TNP sec      Imaging:  No post-op imaging studies    Assessment:  82yFemale patient S/P exploratory laparotomy abdominal washout attempted closure of abdominal wound, application of abthera vac dressing.     Plan:  Continue care per SICU team  Wean vent as tolerated  Wean pressors as tolerated  Monitor UOP  Monitor NGT output  Strict I/Os    -  Date/Time: 02-19-23 @ 19:49 Post Operative Note  Patient: RUBIO HUGHES 82y (1940) Female   MRN: [id number]  Location: [address]  Visit: 02-10-23 Inpatient  Date: 02-19-23 @ 19:49    Procedure: S/P Exploratory laparotomy, Abdominal washout, attempted closure of abdominal wound- superior portion fascia partially closed with sutures.  Attempts to close lower portion resulted in elevated peak airway pressures. Application of Abthera vac dressing    Subjective:   Patient seen and examined at bedside  Patient on low dose Levophed at 0.03, Precedex 1.5  Donis in place, NGT in place  Patient responsive while on the vent  Abthera in place, holding suction  Colostomy bag noted, non productive with bowel sweat in bag.     Objective:  Vitals: T(F): 98.6 (02-19-23 @ 16:45), Max: 99 (02-19-23 @ 00:00)  HR: 59 (02-19-23 @ 18:00)  BP: 112/53 (02-19-23 @ 18:00) (102/49 - 140/63)  RR: 22 (02-19-23 @ 14:39)  SpO2: 98% (02-19-23 @ 18:00)  Vent Settings: Mode: AC/ CMV (Assist Control/ Continuous Mandatory Ventilation), RR (machine): 10, TV (machine): 450, FiO2: 40, PEEP: 5, MAP: 10, PIP: 29    In:   02-18-23 @ 07:01  -  02-19-23 @ 07:00  --------------------------------------------------------  IN: 2755.8 mL    02-19-23 @ 07:01  -  02-19-23 @ 19:49  --------------------------------------------------------  IN: 1061.5 mL      IV Fluids: Parenteral Nutrition - Adult 1 Each (65 mL/Hr) TPN Continuous <Continuous>  Parenteral Nutrition - Adult 1 Each (65 mL/Hr) TPN Continuous <Continuous>      Out:   02-18-23 @ 07:01  -  02-19-23 @ 07:00  --------------------------------------------------------  OUT: 2460 mL    02-19-23 @ 07:01  -  02-19-23 @ 19:49  --------------------------------------------------------  OUT: 1445 mL      EBL:     Voided Urine:   02-18-23 @ 07:01  -  02-19-23 @ 07:00  --------------------------------------------------------  OUT: 2460 mL    02-19-23 @ 07:01  -  02-19-23 @ 19:49  --------------------------------------------------------  OUT: 1445 mL      Donis Catheter: yes no   Drains:   JESSICA:   02-18-23 @ 07:01  -  02-19-23 @ 07:00  --------------------------------------------------------  OUT: 1100 mL    02-19-23 @ 07:01  -  02-19-23 @ 19:49  --------------------------------------------------------  OUT: 400 mL     ,   Chest Tube:      NG Tube:   02-18-23 @ 07:01  -  02-19-23 @ 07:00  --------------------------------------------------------  OUT: 400 mL    02-19-23 @ 07:01  -  02-19-23 @ 19:49  --------------------------------------------------------  OUT: 420 mL        Physical Examination:  General Appearance: NAD, on mechanical ventilation  HEENT: EOMI, sclera non-icteric.  Heart: RRR  Lungs: CTABL  Abdomen:  Soft, nontender, distended abdomen. No rigidity, guarding, or rebound tenderness. Abthera vac in place. NGT in place  MSK/Extremities: Warm & well-perfused. Peripheral pulses intact. Colostomy bag noted, pink patent protuberant non productive.   Skin: Warm, dry. No jaundice.   Incisions/Wounds: Dressings in place, clean, dry and intact, no signs of infection/active bleeding/drainage    Medications: [Standing]  aspirin Suppository 300 milliGRAM(s) Rectal <User Schedule>  benzocaine 20% Spray 1 Spray(s) Topical once  cefiderocol IVPB 2000 milliGRAM(s) IV Intermittent every 8 hours  chlorhexidine 0.12% Liquid 15 milliLiter(s) Oral Mucosa every 12 hours  chlorhexidine 2% Cloths 1 Application(s) Topical <User Schedule>  dexMEDEtomidine Infusion 0.05 MICROgram(s)/kG/Hr IV Continuous <Continuous>  fentaNYL    Injectable 25 MICROGram(s) IV Push every 4 hours PRN  heparin   Injectable 5000 Unit(s) SubCutaneous every 8 hours  norepinephrine Infusion 0.05 MICROgram(s)/kG/Min IV Continuous <Continuous>  ondansetron Injectable 4 milliGRAM(s) IV Push every 6 hours PRN  pantoprazole  Injectable 40 milliGRAM(s) IV Push every 24 hours  Parenteral Nutrition - Adult 1 Each TPN Continuous <Continuous>  Parenteral Nutrition - Adult 1 Each TPN Continuous <Continuous>    Medications: [PRN]  aspirin Suppository 300 milliGRAM(s) Rectal <User Schedule>  benzocaine 20% Spray 1 Spray(s) Topical once  cefiderocol IVPB 2000 milliGRAM(s) IV Intermittent every 8 hours  chlorhexidine 0.12% Liquid 15 milliLiter(s) Oral Mucosa every 12 hours  chlorhexidine 2% Cloths 1 Application(s) Topical <User Schedule>  dexMEDEtomidine Infusion 0.05 MICROgram(s)/kG/Hr IV Continuous <Continuous>  fentaNYL    Injectable 25 MICROGram(s) IV Push every 4 hours PRN  heparin   Injectable 5000 Unit(s) SubCutaneous every 8 hours  norepinephrine Infusion 0.05 MICROgram(s)/kG/Min IV Continuous <Continuous>  ondansetron Injectable 4 milliGRAM(s) IV Push every 6 hours PRN  pantoprazole  Injectable 40 milliGRAM(s) IV Push every 24 hours  Parenteral Nutrition - Adult 1 Each TPN Continuous <Continuous>  Parenteral Nutrition - Adult 1 Each TPN Continuous <Continuous>    Labs:                        7.5    10.53 )-----------( 331      ( 19 Feb 2023 08:11 )             23.8     02-19    139  |  110  |  22<H>  ----------------------------<  141<H>  3.9   |  23  |  0.5<L>    Ca    6.8<L>      19 Feb 2023 05:00  Phos  2.2     02-19  Mg     2.0     02-19      PT/INR - ( 19 Feb 2023 05:00 )   PT: TNP sec;   INR: TNP ratio         PTT - ( 19 Feb 2023 05:00 )  PTT:TNP sec      Imaging:  No post-op imaging studies    Assessment:  82yFemale patient S/P exploratory laparotomy abdominal washout attempted closure of abdominal wound, application of abthera vac dressing.     Plan:  Continue care per SICU team  Wean vent as tolerated  Wean pressors as tolerated  Monitor UOP  Monitor NGT output  Strict I/Os  Monitor ostomy output    -  Date/Time: 02-19-23 @ 19:49

## 2023-02-19 NOTE — PROGRESS NOTE ADULT - SUBJECTIVE AND OBJECTIVE BOX
RUBIO HUGHES  [id number]  82y Female    Indication for ICU admission: open abdomen s/p bowel resection for SBO  Admit Date: 2/9  ICU Date:  2/14  OR Date: 2/14, 2/16    No Known Allergies    PAST MEDICAL & SURGICAL HISTORY:  Atrial fibrillation  Hypertension  CVA (cerebrovascular accident)  HLD (hyperlipidemia)  GERD (gastroesophageal reflux disease)  History of cholecystectomy  History of hysterectomy  History of bladder suspension procedure      24HRS EVENT:  2/18   Night  R arm blisters?  A line not drawing back  Hypoxia? SpO2 93%, improved with suctioning with thick secretions    DAY  -LR decreased to 50-->now IVL  -BAL + for klebsiella pneumonia, resistant to carbapenem, f/u ID recs --> Fetroja 2g q 8 started  -echo:  1. Elevated mean left atrial pressure.   2. Lv not well visualized suspect normal lv function, suggest lumason as   ? distal anteroseptal hypokinesis.      E/e': 17.   3. Normal left atrial size.   4. Degenerative mitral valve.   5. Sclerotic aortic valve with normal opening.      REVIEW OF SYSTEMS    [ ] A ten-point review of systems was otherwise negative except as noted.  [ x] Due to altered mental status/intubation, subjective information were not able to be obtained from the patient. History was obtained, to the extent possible, from review of the chart and collateral sources of information

## 2023-02-19 NOTE — PROGRESS NOTE ADULT - ASSESSMENT
Assessment and Plan:   82y Female  2/16: Previous cecetomy site noted to be ischemic, omental patch repair, rest of bowel no ischemic changes, bowel edematous, abthera vac in place, abdomen open  2/14: s/p Sigmoid colectomy with ostomy, sigmoid mass resection for SBO/LBO.     NEURO:  #Acute pain    -APAP IV prn    -Fentanyl 25 mcg prn  #Acute sedation    -Precedex infusion    RESP:   #Oxygenation  RR (machine): 10, TV (machine): 450, FiO2: 40, PEEP: 5  -ET tube lip line 21, ETT advanced 2/17 for 1cm to 22cm  -Daily CXR   -ABG:     CARDS:   #hypotension 2/2 hypovolemic shock  -Wean levophed  -#Imaging/labs  Echo 2/18:   1. Elevated mean left atrial pressure.  2. Lv not well visualized suspect normal lv function, suggest lumason as   ? distal anteroseptal hypokinesis.      E/e': 17.   3. Normal left atrial size.   4. Degenerative mitral valve.   5. Sclerotic aortic valve with normal opening.  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  - f/u pathology  - 3L suctioned out of small bowel, 1.2L removed from NGT intraop  - RTOR 2/16: Previous cecetomy site noted to be ischemic, omental patch repair, rest of bowel no ischemic changes, bowel edematous, abthera vac in place, abdomen open, discuss next RTOR with primary team   - Abthera at 150mmHg per primary team  - TPN  @ 65/hr   #Diet, NPO, NGT in place to suction    -aspiration precautions, HOB 30  #GI Prophylaxis    -Pantoprazole  #Bowel regimen    -HOLDING    /RENAL:   #urine output in crtically ill    -indwelling rodriguez (placed 2/14/2023)  #GERA  (baseline Cr 0.8)     -peak 1.4    Labs:          BUN/Cr- 14/0.6  -->,  16/0.5  -->          Electrolytes-Na 138 // K 3.7 // Mg 1.9 //  Phos 2.8 (02-18 @ 05:50)    HEME/ONC:   #DVT prophylaxis    -heparin   Injectable, SCDs    Labs: Hb/Hct:  8.8/27.5  -->,  8.3/26.2  -->                      Plts:  348  -->,  367  -->                 PTT/INR:          ID:  WBC- 6.68  --->>,  8.43  --->>,  11.08  --->>  Temp trend- 24hrs T(F): 99 (02-19 @ 00:00), Max: 99 (02-19 @ 00:00)  Antibiotics-cefiderocol IVPB 2000 every 8 hours    Cultures:  (collected 02-15)  Source: ET Tube ET Tube  Final Report:    Moderate Klebsiella pneumoniae (Carbapenem Resistant)  (collected 02-15)  Source: .Blood Blood  Preliminary Report:    No growth to date.    (collected 02-14)  Source: .Body Fluid peritoneal  Preliminary Report:    No growth to date.      ENDO:    -FSG q6 while NPO    -Glucose goal 140-180    -if above 180 start ISS     HA1C     LINES/DRAINS:  PIV, Rodriguez, Right Radial A line, ETT, Abdominal abthera vac    ADVANCED DIRECTIVES:  Full Code    INDICATION FOR SICU: HEMODYNAMIC MONITORING POSTOPERATIVE REQUIRING VASOPRESSORS AND MECHANICAL VENTILATION        Assessment and Plan:   82y Female  2/16: Previous cecetomy site noted to be ischemic, omental patch repair, rest of bowel no ischemic changes, bowel edematous, abthera vac in place, abdomen open  2/14: s/p Sigmoid colectomy with ostomy, sigmoid mass resection for SBO/LBO.     NEURO:  #Acute pain    -APAP IV prn    -Fentanyl 25 mcg prn  #Acute sedation    -Precedex infusion    RESP:   #Oxygenation  RR (machine): 10, TV (machine): 450, FiO2: 40, PEEP: 5  -ET tube lip line 21, ETT advanced 2/17 for 1cm to 22cm  -Daily CXR   -ABG: ABG - ( 19 Feb 2023 03:35 )  pH, Arterial: 7.46  pH, Blood: x     /  pCO2: 31    /  pO2: 115   / HCO3: 22    / Base Excess: -1.3  /  SaO2: 99.3      CARDS:   #hypotension 2/2 hypovolemic shock  -Wean levophed  -#Imaging/labs  Echo 2/18:   1. Elevated mean left atrial pressure.  2. Lv not well visualized suspect normal lv function, suggest lumason as   ? distal anteroseptal hypokinesis.      E/e': 17.   3. Normal left atrial size.   4. Degenerative mitral valve.   5. Sclerotic aortic valve with normal opening.  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  - f/u pathology  - 3L suctioned out of small bowel, 1.2L removed from NGT intraop  - RTOR 2/16: Previous cecetomy site noted to be ischemic, omental patch repair, rest of bowel no ischemic changes, bowel edematous, abthera vac in place, abdomen open, discuss next RTOR with primary team   - Abthera at 150mmHg per primary team  - TPN  @ 65/hr   #Diet, NPO, NGT in place to suction    -aspiration precautions, HOB 30  #GI Prophylaxis    -Pantoprazole  #Bowel regimen    -HOLDING    /RENAL:   #urine output in crtically ill    -indwelling rodriguez (placed 2/14/2023)  #GERA  (baseline Cr 0.8)     -peak 1.4    Labs:          BUN/Cr- 14/0.6  -->,  16/0.5  -->          Electrolytes-Na 138 // K 3.7 // Mg 1.9 //  Phos 2.8 (02-18 @ 05:50)    HEME/ONC:   #DVT prophylaxis    -heparin   Injectable, SCDs    Labs: Hb/Hct:  8.8/27.5  -->,  8.3/26.2  -->                      Plts:  348  -->,  367  -->                 PTT/INR:          ID:  WBC- 6.68  --->>,  8.43  --->>,  11.08  --->>  Temp trend- 24hrs T(F): 99 (02-19 @ 00:00), Max: 99 (02-19 @ 00:00)  Antibiotics-cefiderocol IVPB 2000 every 8 hours    Cultures:  (collected 02-15)  Source: ET Tube ET Tube  Final Report:    Moderate Klebsiella pneumoniae (Carbapenem Resistant)  (collected 02-15)  Source: .Blood Blood  Preliminary Report:    No growth to date.    (collected 02-14)  Source: .Body Fluid peritoneal  Preliminary Report:    No growth to date.      ENDO:    -FSG q6 while NPO    -Glucose goal 140-180    -if above 180 start ISS     HA1C     LINES/DRAINS:  PIV, Rodriguez, Right Radial A line, ETT, Abdominal abthera vac    ADVANCED DIRECTIVES:  Full Code    INDICATION FOR SICU: HEMODYNAMIC MONITORING POSTOPERATIVE REQUIRING VASOPRESSORS AND MECHANICAL VENTILATION

## 2023-02-19 NOTE — CHART NOTE - NSCHARTNOTEFT_GEN_A_CORE
PACU ANESTHESIA ADMISSION NOTE      Procedure: Exploratory laparotomy    Partial resection of sigmoid colon    Creation of colostomy of descending colon    Abdominal washout    Mobilization, splenic flexure    Second look laparotomy    Closure, temporary, abdominal cavity      Post op diagnosis:  Large bowel obstruction    Open wound of abdominal wall        _x___  Intubated  TV:__450____       Rate: _10_____      FiO2: _80%_____ PEEP 5         Vitals:            T: 37               BP :  104/64              R:   10           Sat:  100%             P: 58      Mental Status:  ____ Awake   _____ Alert   _____ Drowsy   __x___ Sedated    Nausea/Vomiting:  _x___  NO       ______Yes,   See Post - Op Orders         Pain Scale (0-10):  __0___    Treatment: _x_ None    _x___ See Post - Op/PCA Orders    Post - Operative Fluids:   ____ Oral   _x___ See Post - Op Orders    Plan: Discharge:   ___Home       _____Floor     __x___Critical Care    _SICU____  Other:_________________    Comments:  No anesthesia issues or complications . Patient remains intubated, transported to SICU  with cardiac monitors, O2 via Ambu-bag to OETT; in no acute distress.  Report given to ICU team

## 2023-02-19 NOTE — PROGRESS NOTE ADULT - NS ATTEND OPT1A GEN_ALL_CORE
History/Exam/Medical decision making

## 2023-02-19 NOTE — CONSULT NOTE ADULT - SUBJECTIVE AND OBJECTIVE BOX
RUBIO HUGHES  82y, Female  Allergy: No Known Allergies      All historical available data reviewed.    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 She has had nausea and emesis which she described as bilious associated with the pain. She has had no bowel movements for the past three days but states that she has had flatus as soon as today.  She reports having diarrhea five days ago. Patient denied hematochezia, hematemesis, chest pain, cough change from chronic baseline, fever, sick contacts, nor urinary symptoms. (10 Feb 2023 02:21)  Hosp course noted.  ID called for Kleb PNA    FAMILY HISTORY:    PAST MEDICAL & SURGICAL HISTORY:  Atrial fibrillation      Hypertension      CVA (cerebrovascular accident)      HLD (hyperlipidemia)      GERD (gastroesophageal reflux disease)      History of cholecystectomy      History of hysterectomy      History of bladder suspension procedure            VITALS:  T(F): 98.9, Max: 99 (02-19-23 @ 00:00)  HR: 62  BP: 130/60  RR: 20Vital Signs Last 24 Hrs  T(C): 37.2 (19 Feb 2023 04:32), Max: 37.2 (18 Feb 2023 16:00)  T(F): 98.9 (19 Feb 2023 04:32), Max: 99 (19 Feb 2023 00:00)  HR: 62 (19 Feb 2023 05:00) (55 - 65)  BP: 130/60 (19 Feb 2023 05:00) (109/53 - 150/68)  BP(mean): 86 (19 Feb 2023 05:00) (75 - 98)  RR: 20 (19 Feb 2023 05:00) (17 - 27)  SpO2: 98% (19 Feb 2023 05:00) (93% - 100%)    Parameters below as of 18 Feb 2023 19:00  Patient On (Oxygen Delivery Method): ventilator    O2 Concentration (%): 40    TESTS & MEASUREMENTS:                        8.3    11.08 )-----------( 367      ( 17 Feb 2023 21:54 )             26.2     02-18    138  |  109  |  16  ----------------------------<  165<H>  3.7   |  24  |  0.5<L>    Ca    6.7<L>      18 Feb 2023 05:50  Phos  2.8     02-18  Mg     1.9     02-18          Culture - Sputum (collected 02-15-23 @ 15:34)  Source: ET Tube ET Tube  Gram Stain (02-16-23 @ 09:32):    No polymorphonuclear leukocytes per low power field    No Squamous epithelial cells per low power field    Rare Gram Negative Rods seen per oil power field  Final Report (02-17-23 @ 23:40):    Moderate Klebsiella pneumoniae (Carbapenem Resistant)    Normal Respiratory Latricia absent  Organism: Klepne MDRO  Klepne MDRO (02-17-23 @ 23:40)  Organism: Klepne MDRO (02-17-23 @ 23:40)      -  Resistance Gene NDM: Detec      -  Resistance Gene to Carbapenem: Detec      Method Type: Hosea  Organism: Klepne MDRO (02-17-23 @ 23:40)      -  Amikacin: R >32      -  Amoxicillin/Clavulanic Acid: R >16/8      -  Ampicillin: R >16 These ampicillin results predict results for amoxicillin      -  Ampicillin/Sulbactam: R >16/8 Enterobacter, Klebsiella aerogenes, Citrobacter, and Serratia may develop resistance during prolonged therapy (3-4 days)      -  Aztreonam: R >16      -  Cefazolin: R >16 Enterobacter, Klebsiella aerogenes, Citrobacter, and Serratia may develop resistance during prolonged therapy (3-4 days)      -  Cefepime: R >16      -  Cefoxitin: R >16      -  Ceftazidime/Avibactam: R >16      -  Ceftolozane/tazobactam: R >8      -  Ceftriaxone: R >32 Enterobacter, Klebsiella aerogenes, Citrobacter, and Serratia may develop resistance during prolonged therapy      -  Ciprofloxacin: R >2      -  Ertapenem: R >1      -  Gentamicin: R >8      -  Imipenem: R >8      -  Levofloxacin: R >4      -  Meropenem: R >8      -  Piperacillin/Tazobactam: R >64      -  Tobramycin: R >8      -  Trimethoprim/Sulfamethoxazole: R >2/38      Method Type: [place]    Culture - Blood (collected 02-15-23 @ 13:59)  Source: .Blood Blood  Preliminary Report (02-16-23 @ 22:02):    No growth to date.    Culture - Body Fluid with Gram Stain (collected 02-14-23 @ 02:38)  Source: .Body Fluid peritoneal  Gram Stain (02-14-23 @ 18:34):    No polymorphonuclear cells seen    No organisms seen    by cytocentrifuge  Preliminary Report (02-15-23 @ 13:36):    No growth to date.            RADIOLOGY & ADDITIONAL TESTS:  Personal review of radiological diagnostics performed  Echo and EKG results noted when applicable.     MEDICATIONS:  aspirin Suppository 300 milliGRAM(s) Rectal <User Schedule>  benzocaine 20% Spray 1 Spray(s) Topical once  cefiderocol IVPB 2000 milliGRAM(s) IV Intermittent every 8 hours  chlorhexidine 0.12% Liquid 15 milliLiter(s) Oral Mucosa every 12 hours  chlorhexidine 2% Cloths 1 Application(s) Topical <User Schedule>  dexMEDEtomidine Infusion 0.05 MICROgram(s)/kG/Hr IV Continuous <Continuous>  fentaNYL    Injectable 25 MICROGram(s) IV Push every 4 hours PRN  heparin   Injectable 5000 Unit(s) SubCutaneous every 8 hours  norepinephrine Infusion 0.05 MICROgram(s)/kG/Min IV Continuous <Continuous>  ondansetron Injectable 4 milliGRAM(s) IV Push every 6 hours PRN  pantoprazole  Injectable 40 milliGRAM(s) IV Push every 24 hours  Parenteral Nutrition - Adult 1 Each TPN Continuous <Continuous>      ANTIBIOTICS:  cefiderocol IVPB 2000 milliGRAM(s) IV Intermittent every 8 hours

## 2023-02-20 NOTE — PROGRESS NOTE ADULT - ATTENDING COMMENTS
81 y/o female with Large Bowel Obstruction.  S/P Ex. Lap and Devon's procedure.  Temporary abdominal closure with Abthera.  Acute respiratory failure with hypoxia.  On mechanical ventilation.  At risk for hemodynamic instability.  Moderate protein malnutrition.    PLAN:  - sedation with Precedex and Fentanyl  - on Vent support; follow ABG, FiO2 40%, PEEP 5  - keep MAP>65; off Levo; euvolemic at this time  - NPO , on TPN  - follow serum electrolytes and UOP  - ID: miniBAL with Klebsiella - on Cefiderocol as per ID  - dvt prophylaxis  Plan for OR for Abthera change today

## 2023-02-20 NOTE — PROGRESS NOTE ADULT - ASSESSMENT
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    Plan:  - RTOR tomorrow 2/21 for abthera replacement   Continue care per SICU team  Wean vent as tolerated  Wean pressors as tolerated  Monitor UOP  Monitor NGT output  Strict I/Os  Monitor ostomy output

## 2023-02-20 NOTE — PROGRESS NOTE ADULT - ASSESSMENT
Assessment and Plan:   82y Female  2/19: abdominal washout, no further resection   2/16: Previous cecetomy site noted to be ischemic, omental patch repair, rest of bowel no ischemic changes, bowel edematous, abthera vac in place, abdomen open  2/14: s/p Sigmoid colectomy with ostomy, sigmoid mass resection for SBO/LBO.     NEURO:  #Acute pain    -APAP IV prn    -Fentanyl 25 mcg prn q4  #Acute sedation    -Precedex infusion    RESP:   #Oxygenation  RR (machine): 10, TV (machine): 450, FiO2: 40, PEEP: 5  -ET tube lip line 21, ETT advanced 2/17 for 1cm to 22cm  -Daily CXR   -ABG:     CARDS:   #hypotension 2/2 hypovolemic shock  -Wean levophed  -#Imaging/labs  Echo 2/18:   1. Elevated mean left atrial pressure.  2. Lv not well visualized suspect normal lv function, suggest lumason as   ? distal anteroseptal hypokinesis.      E/e': 17.   3. Normal left atrial size.   4. Degenerative mitral valve.   5. Sclerotic aortic valve with normal opening.  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  - f/u pathology  - 3L suctioned out of small bowel, 1.2L removed from NGT intraop  - RTOR 2/16: Previous cecetomy site noted to be ischemic, omental patch repair, rest of bowel no ischemic changes, bowel edematous, abthera vac in place, abdomen open, discuss next RTOR with primary team   - Abthera at 150mmHg per primary team  - TPN  @ 65/hr   #Diet, NPO, NGT in place to suction    -aspiration precautions, HOB 30  #GI Prophylaxis    -Pantoprazole  #Bowel regimen    -HOLDING    /RENAL:   #urine output in crtically ill    -indwelling rodriguez (placed 2/14/2023)  #GERA  (baseline Cr 0.8) > resolved     -peak 1.4    Labs:          BUN/Cr- 16/0.5  -->,  22/0.5  -->          Electrolytes-Na -- // K -- // Mg 2.0 //  Phos 2.2 (02-19 @ 08:11)      HEME/ONC:   #DVT prophylaxis    -heparin   Injectable, SCDs    Labs: Hb/Hct:  7.1/22.5  -->,  7.5/23.8  -->                      Plts:  257  -->,  331  -->                 PTT/INR:          ID:  WBC- 11.08  --->>,  10.51  --->>,  10.53  --->>  Temp trend- 24hrs T(F): 99.9 (02-20 @ 00:00), Max: 99.9 (02-20 @ 00:00)  Antibiotics-cefiderocol IVPB 2000 every 8 hours    Cultures:  (collected 02-15)  Source: ET Tube  Final Report:    Moderate Klebsiella pneumoniae (Carbapenem Resistant)  (collected 02-15)  Source: .Blood Blood  Preliminary Report:    No growth to date.    (collected 02-14)  Source: .Body Fluid peritoneal  Preliminary Report:    No growth to date.      ENDO:    -FSG q6 while NPO    -Glucose goal 140-180    -if above 180 start ISS     HA1C     LINES/DRAINS:  PIV, Rodriguez, Left Radial A line, ETT, Abdominal abthera vac    ADVANCED DIRECTIVES:  Full Code    INDICATION FOR SICU: HEMODYNAMIC MONITORING POSTOPERATIVE REQUIRING VASOPRESSORS AND MECHANICAL VENTILATION    Assessment and Plan:   82y Female  2/19: abdominal washout, no further resection   2/16: Previous cecetomy site noted to be ischemic, omental patch repair, rest of bowel no ischemic changes, bowel edematous, abthera vac in place, abdomen open  2/14: s/p Sigmoid colectomy with ostomy, sigmoid mass resection for SBO/LBO.     NEURO:  #Acute pain    -APAP IV prn    -Fentanyl 25 mcg prn q4  #Acute sedation    -Precedex infusion    RESP:   #Oxygenation  RR (machine): 10, TV (machine): 450, FiO2: 40, PEEP: 5  02-20 @ 03:41--7.45 / 31 / 113 / 22 / 99.5  O2 99.5  Lac 1.50    -ET tube lip line 21, ETT advanced 2/17 for 1cm to 22cm  -Daily CXR     CARDS:   #hypotension 2/2 hypovolemic shock  -Wean levophed, currently @0.05  -#Imaging/labs  Echo 2/18:   1. Elevated mean left atrial pressure.  2. Lv not well visualized suspect normal lv function, suggest lumason as   ? distal anteroseptal hypokinesis.      E/e': 17.   3. Normal left atrial size.   4. Degenerative mitral valve.   5. Sclerotic aortic valve with normal opening.  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  attempts to close lower portion resulted in elevated peak airway pressures. Application of abthera vac dressing  - TPN  @ 65/hr   #Diet, NPO, NGT in place to suction    -aspiration precautions, HOB 30  #GI Prophylaxis    -Pantoprazole  #Bowel regimen    -HOLDING    /RENAL:   #urine output in critically ill    -indwelling rodriguez (placed 2/14/2023)  #GERA  (baseline Cr 0.8) > resolved     -peak 1.4    Monitor UO-rodriguez in place    Current Rx:     Labs:          BUN/Cr- 22/0.5  -->,  25/0.6  -->          Electrolytes-Na 143 // K 4.1 // Mg 1.9 //  Phos 3.1 (02-20 @ 06:38)        HEME/ONC:     DVT prophylaxis-heparin   Injectable  , SCDs    Labs: Hb/Hct:  7.5/23.8  -->,  7.9/25.3  -->                      Plts:  331  -->,  454  -->                 PTT/INR:        ID:  WBC- 10.51  --->>,  10.53  --->>,  16.33  --->>  Temp trend- 24hrs T(F): 100.2 (02-20 @ 05:00), Max: 100.2 (02-20 @ 05:00)  Antibiotics-cefiderocol IVPB 2000 every 8 hours      Cultures:  (collected 02-15)  Source: ET Tube  Final Report:    Moderate Klebsiella pneumoniae (Carbapenem Resistant)  (collected 02-15)  Source: .Blood Blood  Preliminary Report:    No growth to date.    (collected 02-14)  Source: .Body Fluid peritoneal  Preliminary Report:    No growth to date.      ENDO:    -FSG q6 while NPO    -Glucose goal 140-180    -if above 180 start ISS     HA1C     LINES/DRAINS:  PIV, Rodriguez, Left Radial A line, ETT, Abdominal abthera vac    ADVANCED DIRECTIVES:  Full Code    INDICATION FOR SICU: HEMODYNAMIC MONITORING POSTOPERATIVE REQUIRING VASOPRESSORS AND MECHANICAL VENTILATION    Assessment and Plan:   82y Female  2/19: abdominal washout, no further resection   2/16: Previous cecetomy site noted to be ischemic, omental patch repair, rest of bowel no ischemic changes, bowel edematous, abthera vac in place, abdomen open  2/14: s/p Sigmoid colectomy with ostomy, sigmoid mass resection for SBO/LBO.     NEURO:  #Acute pain    -APAP IV prn    -Fentanyl 25 mcg prn q4  #Acute sedation    -Precedex infusion    RESP:   #Oxygenation  RR (machine): 10, TV (machine): 450, FiO2: 40, PEEP: 5  02-20 @ 03:41--7.45 / 31 / 113 / 22 / 99.5  O2 99.5  Lac 1.50    -ET tube lip line 21, ETT advanced 2/17 for 1cm to 22cm  -Daily CXR     CARDS:   #hypotension 2/2 hypovolemic shock-resolved  -Prev on levophed, off since 10:30 AM 2/20  -#Imaging/labs  Echo 2/18:   1. Elevated mean left atrial pressure.  2. Lv not well visualized suspect normal lv function, suggest lumason as   ? distal anteroseptal hypokinesis.      E/e': 17.   3. Normal left atrial size.   4. Degenerative mitral valve.   5. Sclerotic aortic valve with normal opening.  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  attempts to close lower portion resulted in elevated peak airway pressures. Application of abthera vac dressing  -RTOR today 2/20 for abdominal washout and replacement of abthera   - TPN  @ 65/hr   #Diet, NPO, NGT in place to suction    -aspiration precautions, HOB 30  #GI Prophylaxis    -Pantoprazole  #Bowel regimen    -HOLDING    /RENAL:   #urine output in critically ill    -indwelling rodriguez (placed 2/14/2023)  #GERA  (baseline Cr 0.8) > resolved     -peak 1.4    Monitor UO-rodriguez in place    Current Rx:     Labs:          BUN/Cr- 22/0.5  -->,  25/0.6  -->          Electrolytes-Na 143 // K 4.1 // Mg 1.9 //  Phos 3.1 (02-20 @ 06:38)        HEME/ONC:     DVT prophylaxis-heparin   Injectable  , SCDs    Labs: Hb/Hct:  7.5/23.8  -->,  7.9/25.3  -->                      Plts:  331  -->,  454  -->                 PTT/INR:        ID:  WBC- 10.51  --->>,  10.53  --->>,  16.33  --->>  Temp trend- 24hrs T(F): 100.2 (02-20 @ 05:00), Max: 100.2 (02-20 @ 05:00)  Antibiotics-cefiderocol IVPB 2000 every 8 hours      Cultures:  (collected 02-15)  Source: ET Tube  Final Report:    Moderate Klebsiella pneumoniae (Carbapenem Resistant)  (collected 02-15)  Source: .Blood Blood  Preliminary Report:    No growth to date.    (collected 02-14)  Source: .Body Fluid peritoneal  Preliminary Report:    No growth to date.      ENDO:    -FSG q6 while NPO    -Glucose goal 140-180    -if above 180 start ISS     HA1C     LINES/DRAINS:  PIV, Rodriguez, Left Radial A line, ETT, Abdominal abthera vac    ADVANCED DIRECTIVES:  Full Code    INDICATION FOR SICU: HEMODYNAMIC MONITORING POSTOPERATIVE REQUIRING VASOPRESSORS AND MECHANICAL VENTILATION

## 2023-02-20 NOTE — PROGRESS NOTE ADULT - SUBJECTIVE AND OBJECTIVE BOX
GENERAL SURGERY PROGRESS NOTE    Patient: RUBIO HUGHES , 82y ([birth year])Female   MRN: [id number]  Location: [address]  Visit: 02-10-23 Inpatient  Date: 02-20-23 @ 02:24    Events of past 24 hours:  Patient seen and examined at bedside  Patient remains ventilated 450/10/40/5  Precedex 1.5 and Levophed 0.04  UOP adequate at 70CC/hr  Colostomy with bowel sweat    PAST MEDICAL & SURGICAL HISTORY:  Atrial fibrillation      Hypertension      CVA (cerebrovascular accident)      HLD (hyperlipidemia)      GERD (gastroesophageal reflux disease)      History of cholecystectomy      History of hysterectomy      History of bladder suspension procedure          Vitals:   T(F): 99.9 (02-20-23 @ 00:00), Max: 99.9 (02-20-23 @ 00:00)  HR: 68 (02-20-23 @ 01:00)  BP: 133/64 (02-20-23 @ 01:00)  RR: 16 (02-19-23 @ 23:00)  SpO2: 99% (02-20-23 @ 01:00)  Mode: AC/ CMV (Assist Control/ Continuous Mandatory Ventilation), RR (machine): 10, TV (machine): 450, FiO2: 40, PEEP: 5, ITime: 1, MAP: 14, PIP: 28    Diet, NPO      Fluids:     I & O's:    02-18-23 @ 07:01  -  02-19-23 @ 07:00  --------------------------------------------------------  IN:    Dexmedetomidine: 631.4 mL    Lactated Ringers: 375 mL    Lactated Ringers: 200 mL    Norepinephrine: 40.7 mL    Norepinephrine: 13.7 mL    TPN (Total Parenteral Nutrition): 1495 mL  Total IN: 2755.8 mL    OUT:    Indwelling Catheter - Urethral (mL): 960 mL    Nasogastric/Oral tube (mL): 400 mL    VAC (Vacuum Assisted Closure) System (mL): 1100 mL  Total OUT: 2460 mL    Total NET: 295.8 mL    Physical Examination:  General Appearance: NAD, on mechanical ventilation  HEENT: EOMI, sclera non-icteric.  Heart: RRR  Lungs: CTABL  Abdomen:  Soft, nontender, distended abdomen. No rigidity, guarding, or rebound tenderness. Abthera vac in place. NGT in place  MSK/Extremities: Warm & well-perfused. Peripheral pulses intact. Colostomy bag noted, pink patent protuberant non productive.   Skin: Warm, dry. No jaundice.   Incisions/Wounds: Dressings in place, clean, dry and intact, no signs of infection/active bleeding/drainage    MEDICATIONS  (STANDING):  aspirin Suppository 300 milliGRAM(s) Rectal <User Schedule>  benzocaine 20% Spray 1 Spray(s) Topical once  cefiderocol IVPB 2000 milliGRAM(s) IV Intermittent every 8 hours  chlorhexidine 0.12% Liquid 15 milliLiter(s) Oral Mucosa every 12 hours  chlorhexidine 2% Cloths 1 Application(s) Topical <User Schedule>  dexMEDEtomidine Infusion 0.05 MICROgram(s)/kG/Hr (1.03 mL/Hr) IV Continuous <Continuous>  heparin   Injectable 5000 Unit(s) SubCutaneous every 8 hours  norepinephrine Infusion 0.05 MICROgram(s)/kG/Min (3.85 mL/Hr) IV Continuous <Continuous>  pantoprazole  Injectable 40 milliGRAM(s) IV Push every 24 hours  Parenteral Nutrition - Adult 1 Each (65 mL/Hr) TPN Continuous <Continuous>    MEDICATIONS  (PRN):  fentaNYL    Injectable 25 MICROGram(s) IV Push every 4 hours PRN Moderate Pain (4 - 6)  midazolam Injectable 1 milliGRAM(s) IV Push every 3 hours PRN Agitation  ondansetron Injectable 4 milliGRAM(s) IV Push every 6 hours PRN Nausea and/or Vomiting      DVT PROPHYLAXIS: heparin   Injectable 5000 Unit(s) SubCutaneous every 8 hours    GI PROPHYLAXIS: pantoprazole  Injectable 40 milliGRAM(s) IV Push every 24 hours    ANTICOAGULATION:   ANTIBIOTICS:  cefiderocol IVPB 2000 milliGRAM(s)            LAB/STUDIES:  Labs:  CAPILLARY BLOOD GLUCOSE      POCT Blood Glucose.: 132 mg/dL (19 Feb 2023 12:29)  POCT Blood Glucose.: 136 mg/dL (19 Feb 2023 05:37)                          7.5    10.53 )-----------( 331      ( 19 Feb 2023 08:11 )             23.8         02-19    139  |  110  |  22<H>  ----------------------------<  141<H>  3.9   |  23  |  0.5<L>      Magnesium, Serum: 2.0 mg/dL (02-19-23 @ 08:11)      LFTs:     Blood Gas Arterial, Lactate: 1.30 mmol/L (02-19-23 @ 03:35)  Blood Gas Arterial, Lactate: 0.90 mmol/L (02-18-23 @ 04:14)  Blood Gas Arterial, Lactate: 0.80 mmol/L (02-17-23 @ 03:49)    ABG - ( 19 Feb 2023 03:35 )  pH: 7.46  /  pCO2: 31    /  pO2: 115   / HCO3: 22    / Base Excess: -1.3  /  SaO2: 99.3            ABG - ( 18 Feb 2023 04:14 )  pH: 7.44  /  pCO2: 32    /  pO2: 101   / HCO3: 22    / Base Excess: -1.6  /  SaO2: 98.7            ABG - ( 17 Feb 2023 03:49 )  pH: 7.43  /  pCO2: 31    /  pO2: 120   / HCO3: 21    / Base Excess: -3.1  /  SaO2: 99.0              Coags:     TNP    ----< TNP     ( 19 Feb 2023 05:00 )     TNP             Serum Pro-Brain Natriuretic Peptide: 822 pg/mL (02-14-23 @ 06:30)

## 2023-02-20 NOTE — PROGRESS NOTE ADULT - SUBJECTIVE AND OBJECTIVE BOX
RUBIO HUGHES  [id number]  82y Female    Indication for ICU admission: open abdomen s/p bowel resection for SBO  Admit Date: 2/9  ICU Date:  2/14  OR Date: 2/14, 2/16    No Known Allergies    PAST MEDICAL & SURGICAL HISTORY:  Atrial fibrillation  Hypertension  CVA (cerebrovascular accident)  HLD (hyperlipidemia)  GERD (gastroesophageal reflux disease)  History of cholecystectomy  History of hysterectomy  History of bladder suspension procedure      Home Medications:      24HRS EVENT:  2/19  Night  -Lasix 20  - A line working- diastolic pressures low   - AM labs     DAY  -OR today  -new a-line  -Lasix 40 after surgery as long as stable  -started Fetroja per ID  -OR today with Dr. Rebollar- bowel beginning to get stuck togteher   no further resection- inferior and superior poles approximately with stitches  -Consider tube feeds in am        REVIEW OF SYSTEMS  [ ] A ten-point review of systems was otherwise negative except as noted.  [ x] Due to altered mental status/intubation, subjective information were not able to be obtained from the patient. History was obtained, to the extent possible, from review of the chart and collateral sources of information    ******************************************************************************************************** RUBIO HUGHES  [id number]  82y Female    Indication for ICU admission: open abdomen s/p bowel resection for SBO  Admit Date: 2/9  ICU Date:  2/14  OR Date: 2/14, 2/16    No Known Allergies    PAST MEDICAL & SURGICAL HISTORY:  Atrial fibrillation  Hypertension  CVA (cerebrovascular accident)  HLD (hyperlipidemia)  GERD (gastroesophageal reflux disease)  History of cholecystectomy  History of hysterectomy  History of bladder suspension procedure      Home Medications:      24HRS EVENT:  2/19  Night  -Lasix 20  - A line working- diastolic pressures low   - AM labs     DAY  -OR today  -new a-line  -Lasix 40 after surgery as long as stable  -started Fetroja per ID  -OR today with Dr. Rebollar- bowel beginning to get stuck togteher   no further resection- inferior and superior poles approximately with stitches  -Consider tube feeds in am        REVIEW OF SYSTEMS  [ ] A ten-point review of systems was otherwise negative except as noted.  [ x] Due to altered mental status/intubation, subjective information were not able to be obtained from the patient. History was obtained, to the extent possible, from review of the chart and collateral sources of information    ********************************************************************************************************  Daily Height in cm: 152.4 (19 Feb 2023 14:39)    Daily     Diet, NPO (02-13-23 @ 09:46)      CURRENT MEDS:  Neurologic Medications  aspirin Suppository 300 milliGRAM(s) Rectal <User Schedule>  dexMEDEtomidine Infusion 0.05 MICROgram(s)/kG/Hr IV Continuous <Continuous>  fentaNYL    Injectable 25 MICROGram(s) IV Push every 4 hours PRN Moderate Pain (4 - 6)  midazolam Injectable 1 milliGRAM(s) IV Push every 3 hours PRN Agitation  ondansetron Injectable 4 milliGRAM(s) IV Push every 6 hours PRN Nausea and/or Vomiting    Respiratory Medications    Cardiovascular Medications  norepinephrine Infusion 0.05 MICROgram(s)/kG/Min IV Continuous <Continuous>    Gastrointestinal Medications  pantoprazole  Injectable 40 milliGRAM(s) IV Push every 24 hours  Parenteral Nutrition - Adult 1 Each TPN Continuous <Continuous>    Genitourinary Medications    Hematologic/Oncologic Medications  heparin   Injectable 5000 Unit(s) SubCutaneous every 8 hours    Antimicrobial/Immunologic Medications  cefiderocol IVPB 2000 milliGRAM(s) IV Intermittent every 8 hours    Endocrine/Metabolic Medications    Topical/Other Medications  benzocaine 20% Spray 1 Spray(s) Topical once  chlorhexidine 0.12% Liquid 15 milliLiter(s) Oral Mucosa every 12 hours  chlorhexidine 2% Cloths 1 Application(s) Topical <User Schedule>      ICU Vital Signs Last 24 Hrs  T(C): 37.9 (20 Feb 2023 05:00), Max: 37.9 (20 Feb 2023 05:00)  T(F): 100.2 (20 Feb 2023 05:00), Max: 100.2 (20 Feb 2023 05:00)  HR: 66 (20 Feb 2023 07:40) (58 - 71)  BP: 116/56 (20 Feb 2023 05:00) (97/46 - 156/67)  BP(mean): 81 (20 Feb 2023 05:00) (67 - 97)  ABP: 123/70 (20 Feb 2023 05:00) (123/70 - 152/41)  ABP(mean): 90 (20 Feb 2023 05:00) (53 - 90)  RR: 16 (19 Feb 2023 23:00) (14 - 22)  SpO2: 99% (20 Feb 2023 07:40) (93% - 100%)    O2 Parameters below as of 20 Feb 2023 05:00  Patient On (Oxygen Delivery Method): ventilator              Mode: AC/ CMV (Assist Control/ Continuous Mandatory Ventilation)  RR (machine): 10  TV (machine): 450  FiO2: 40  PEEP: 5  ITime: 1  MAP: 13  PIP: 29    ABG - ( 20 Feb 2023 03:41 )  pH, Arterial: 7.45  pH, Blood: x     /  pCO2: 31    /  pO2: 113   / HCO3: 22    / Base Excess: -2.0  /  SaO2: 99.5                I&O's Summary    19 Feb 2023 07:01  -  20 Feb 2023 07:00  --------------------------------------------------------  IN: 2253.1 mL / OUT: 3200 mL / NET: -946.9 mL      I&O's Detail    19 Feb 2023 07:01  -  20 Feb 2023 07:00  --------------------------------------------------------  IN:    Dexmedetomidine: 698.8 mL    Norepinephrine: 59.3 mL    TPN (Total Parenteral Nutrition): 1495 mL  Total IN: 2253.1 mL    OUT:    Indwelling Catheter - Urethral (mL): 1580 mL    Nasogastric/Oral tube (mL): 720 mL    VAC (Vacuum Assisted Closure) System (mL): 900 mL  Total OUT: 3200 mL    Total NET: -946.9 mL          PHYSICAL EXAM:    General/Neuro  RASS:             GCS:     = E   / V   / M      Deficits:                             alert & oriented x 3, no focal deficits  Pupils:    Lungs:      clear to auscultation, Normal expansion/effort.     Cardiovascular : S1, S2.  Regular rate and rhythm.    GI: Abdomen:  Soft, nontender, mildly distended abdomen. No rigidity, guarding, or rebound tenderness. Abthera vac in place. L sided colostomy, pink and patent, with bowel sweat noted. NGT in place    MSK/Extremities: Warm & well-perfused. Peripheral pulses intact.     Derm: Good skin turgor, no skin breakdown.      :       Donis catheter in place.      CXR:     LABS:  CAPILLARY BLOOD GLUCOSE      POCT Blood Glucose.: 153 mg/dL (20 Feb 2023 07:49)  POCT Blood Glucose.: 132 mg/dL (19 Feb 2023 12:29)                          7.9    16.33 )-----------( 454      ( 20 Feb 2023 06:26 )             25.3       02-20    143  |  114<H>  |  25<H>  ----------------------------<  139<H>  4.1   |  24  |  0.6<L>    Ca    7.0<L>      20 Feb 2023 06:38  Phos  3.1     02-20  Mg     1.9     02-20        PT/INR - ( 19 Feb 2023 05:00 )   PT: TNP sec;   INR: TNP ratio         PTT - ( 19 Feb 2023 05:00 )  PTT:TNP sec

## 2023-02-21 NOTE — PROGRESS NOTE ADULT - ASSESSMENT
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    Plan:  - RTOR today 2/21 for abthera replacement   Continue care per SICU team  Wean vent as tolerated  Wean pressors as tolerated  Monitor UOP  Monitor NGT output  Strict I/Os  Monitor ostomy output    ACS Team  SPECTRA [id number]

## 2023-02-21 NOTE — PROGRESS NOTE ADULT - SUBJECTIVE AND OBJECTIVE BOX
GENERAL SURGERY PROGRESS NOTE    Admission: Other specified disorders of intestines  Hospital Day: 13  Large bowel obstruction    24 Hour Events:  OR procedure re-added on for today 2/21 (RTOR for takedown of abthera)  No acute events  Patient remains on vent with pressor requirement decreasing    Vitals:  T(F): 100.3 (02-20-23 @ 23:13), Max: 100.6 (02-20-23 @ 20:40)  HR: 62 (02-21-23 @ 03:12)  BP: 129/58 (02-21-23 @ 02:00)  RR: 19 (02-20-23 @ 19:46)  SpO2: 97% (02-21-23 @ 03:12)  Mode: AC/ CMV (Assist Control/ Continuous Mandatory Ventilation), RR (machine): 10, TV (machine): 450, FiO2: 40, PEEP: 5, ITime: 1, MAP: 11, PIP: 25    Diet, NPO    Fluids:     I & O's:    02-19-23 @ 07:01  -  02-20-23 @ 07:00  --------------------------------------------------------  IN:    Dexmedetomidine: 698.8 mL    Norepinephrine: 59.3 mL    TPN (Total Parenteral Nutrition): 1495 mL  Total IN: 2253.1 mL    OUT:    Indwelling Catheter - Urethral (mL): 1580 mL    Nasogastric/Oral tube (mL): 720 mL    VAC (Vacuum Assisted Closure) System (mL): 900 mL  Total OUT: 3200 mL    Total NET: -946.9 mL    PHYSICAL EXAM:  General: Sedated, intubated  Cardiac: RRR, S1/S2 identified, nmrg  Respiratory: Intubated  Abdomen: Soft, moderately-distended  Musculoskeletal: FROM in b/l UE and LE  Neuro: Sensation grossly intact and equal throughout, no focal deficits  Skin: Warm/dry, normal color, no jaundice  Incision/wound: Abthera intact    MEDICATIONS  (STANDING):  aspirin Suppository 300 milliGRAM(s) Rectal <User Schedule>  benzocaine 20% Spray 1 Spray(s) Topical once  cefiderocol IVPB 2000 milliGRAM(s) IV Intermittent every 8 hours  chlorhexidine 0.12% Liquid 15 milliLiter(s) Oral Mucosa every 12 hours  chlorhexidine 2% Cloths 1 Application(s) Topical <User Schedule>  dexMEDEtomidine Infusion 0.05 MICROgram(s)/kG/Hr (1.03 mL/Hr) IV Continuous <Continuous>  fat emulsion (Fish Oil and Plant Based) 20% Infusion 1.2183 Gm/kG/Day (31.3 mL/Hr) IV Continuous <Continuous>  heparin   Injectable 5000 Unit(s) SubCutaneous every 8 hours  pantoprazole  Injectable 40 milliGRAM(s) IV Push every 24 hours  Parenteral Nutrition - Adult 1 Each (65 mL/Hr) TPN Continuous <Continuous>    MEDICATIONS  (PRN):  fentaNYL    Injectable 25 MICROGram(s) IV Push every 4 hours PRN Moderate Pain (4 - 6)  midazolam Injectable 1 milliGRAM(s) IV Push every 3 hours PRN Agitation  ondansetron Injectable 4 milliGRAM(s) IV Push every 6 hours PRN Nausea and/or Vomiting      DVT PROPHYLAXIS: heparin   Injectable 5000 Unit(s) SubCutaneous every 8 hours    GI PROPHYLAXIS: pantoprazole  Injectable 40 milliGRAM(s) IV Push every 24 hours    ANTICOAGULATION:   ANTIBIOTICS:  cefiderocol IVPB 2000 milliGRAM(s)    LAB/STUDIES:  Labs:  CAPILLARY BLOOD GLUCOSE      POCT Blood Glucose.: 160 mg/dL (20 Feb 2023 23:19)  POCT Blood Glucose.: 186 mg/dL (20 Feb 2023 17:25)  POCT Blood Glucose.: 160 mg/dL (20 Feb 2023 11:24)  POCT Blood Glucose.: 153 mg/dL (20 Feb 2023 07:49)                          7.9    16.33 )-----------( 454      ( 20 Feb 2023 06:26 )             25.3       Auto Neutrophil %: 81.2 % (02-20-23 @ 06:26)  Auto Immature Granulocyte %: 1.8 % (02-20-23 @ 06:26)    02-20    143  |  114<H>  |  25<H>  ----------------------------<  139<H>  4.1   |  24  |  0.6<L>      Calcium, Total Serum: 7.0 mg/dL (02-20-23 @ 06:38)      LFTs:     Blood Gas Arterial, Lactate: 1.50 mmol/L (02-20-23 @ 03:41)  Blood Gas Arterial, Lactate: 1.30 mmol/L (02-19-23 @ 03:35)  Blood Gas Arterial, Lactate: 0.90 mmol/L (02-18-23 @ 04:14)    ABG - ( 20 Feb 2023 03:41 )  pH: 7.45  /  pCO2: 31    /  pO2: 113   / HCO3: 22    / Base Excess: -2.0  /  SaO2: 99.5      ABG - ( 19 Feb 2023 03:35 )  pH: 7.46  /  pCO2: 31    /  pO2: 115   / HCO3: 22    / Base Excess: -1.3  /  SaO2: 99.3      ABG - ( 18 Feb 2023 04:14 )  pH: 7.44  /  pCO2: 32    /  pO2: 101   / HCO3: 22    / Base Excess: -1.6  /  SaO2: 98.7        Coags:     TNP    ----< TNP     ( 19 Feb 2023 05:00 )     TNP       Serum Pro-Brain Natriuretic Peptide: 822 pg/mL (02-14-23 @ 06:30)

## 2023-02-21 NOTE — PROGRESS NOTE ADULT - SUBJECTIVE AND OBJECTIVE BOX
RUBIO HUGHES  [id number]  82y Female    Indication for ICU admission: open abdomen s/p bowel resection for SBO  Admit Date: 2/9  ICU Date:  2/14  OR Date: 2/14, 2/16    No Known Allergies    PAST MEDICAL & SURGICAL HISTORY:  Atrial fibrillation  Hypertension  CVA (cerebrovascular accident)  HLD (hyperlipidemia)  GERD (gastroesophageal reflux disease)  History of cholecystectomy  History of hysterectomy  History of bladder suspension procedure      Home Medications:      24HRS EVENT:  2/20  Night   Continues off levo  Low grade temp 100.6  Did not respond to 40 lasix    Day  RTOR for takedown of abthera --> moved as add-on for tomorrow   40 Lasix   off levo         REVIEW OF SYSTEMS  [ ] A ten-point review of systems was otherwise negative except as noted.  [ x] Due to altered mental status/intubation, subjective information were not able to be obtained from the patient. History was obtained, to the extent possible, from review of the chart and collateral sources of information    ********************************************************************************************************     RUBIO HUGHES  [id number]  82y Female    Indication for ICU admission: open abdomen s/p bowel resection for SBO  Admit Date: 2/9  ICU Date:  2/14  OR Date: 2/14, 2/16    No Known Allergies    PAST MEDICAL & SURGICAL HISTORY:  Atrial fibrillation  Hypertension  CVA (cerebrovascular accident)  HLD (hyperlipidemia)  GERD (gastroesophageal reflux disease)  History of cholecystectomy  History of hysterectomy  History of bladder suspension procedure      Home Medications:      24HRS EVENT:  2/20  Night   Continues off levo  Low grade temp 100.6  Did not respond to 40 lasix    Day  RTOR for takedown of abthera --> moved as add-on for tomorrow   40 Lasix   off levo         REVIEW OF SYSTEMS  [ ] A ten-point review of systems was otherwise negative except as noted.  [ x] Due to altered mental status/intubation, subjective information were not able to be obtained from the patient. History was obtained, to the extent possible, from review of the chart and collateral sources of information    ********************************************************************************************************  Daily Height in cm: 152.4 (21 Feb 2023 10:45)    Daily     Diet, NPO (02-13-23 @ 09:46)      CURRENT MEDS:  Neurologic Medications  aspirin Suppository 300 milliGRAM(s) Rectal <User Schedule>  dexMEDEtomidine Infusion 0.05 MICROgram(s)/kG/Hr IV Continuous <Continuous>  fentaNYL    Injectable 25 MICROGram(s) IV Push every 4 hours PRN Moderate Pain (4 - 6)  ondansetron Injectable 4 milliGRAM(s) IV Push every 6 hours PRN Nausea and/or Vomiting    Respiratory Medications    Cardiovascular Medications    Gastrointestinal Medications  fat emulsion (Fish Oil and Plant Based) 20% Infusion 1.2183 Gm/kG/Day IV Continuous <Continuous>  pantoprazole  Injectable 40 milliGRAM(s) IV Push every 24 hours  Parenteral Nutrition - Adult 1 Each TPN Continuous <Continuous>    Genitourinary Medications    Hematologic/Oncologic Medications  heparin   Injectable 5000 Unit(s) SubCutaneous every 8 hours    Antimicrobial/Immunologic Medications  cefiderocol IVPB 2000 milliGRAM(s) IV Intermittent every 8 hours    Endocrine/Metabolic Medications    Topical/Other Medications  benzocaine 20% Spray 1 Spray(s) Topical once  chlorhexidine 0.12% Liquid 15 milliLiter(s) Oral Mucosa every 12 hours  chlorhexidine 2% Cloths 1 Application(s) Topical <User Schedule>      ICU Vital Signs Last 24 Hrs  T(C): 36.8 (21 Feb 2023 07:47), Max: 38.1 (20 Feb 2023 20:40)  T(F): 98.3 (21 Feb 2023 07:47), Max: 100.6 (20 Feb 2023 20:40)  HR: 70 (21 Feb 2023 11:00) (60 - 77)  BP: 129/58 (21 Feb 2023 11:00) (120/58 - 160/67)  BP(mean): 83 (21 Feb 2023 11:00) (80 - 101)  ABP: --  ABP(mean): --  RR: 19 (20 Feb 2023 19:46) (18 - 19)  SpO2: 99% (21 Feb 2023 11:00) (97% - 99%)    O2 Parameters below as of 21 Feb 2023 11:00  Patient On (Oxygen Delivery Method): ventilator    O2 Concentration (%): 40          Mode: AC/ CMV (Assist Control/ Continuous Mandatory Ventilation)  RR (machine): 10  TV (machine): 450  FiO2: 40  PEEP: 5  ITime: 1  MAP: 12  PIP: 27    ABG - ( 21 Feb 2023 03:32 )  pH, Arterial: 7.42  pH, Blood: x     /  pCO2: 32    /  pO2: 89    / HCO3: 21    / Base Excess: -3.4  /  SaO2: 98.5                I&O's Summary    20 Feb 2023 07:01  -  21 Feb 2023 07:00  --------------------------------------------------------  IN: 2355.6 mL / OUT: 2090 mL / NET: 265.6 mL    21 Feb 2023 07:01  -  21 Feb 2023 11:27  --------------------------------------------------------  IN: 257.6 mL / OUT: 360 mL / NET: -102.4 mL      I&O's Detail    20 Feb 2023 07:01  -  21 Feb 2023 07:00  --------------------------------------------------------  IN:    Dexmedetomidine: 473.6 mL    Fat Emulsion (Fish Oil &amp; Plant Based) 20% Infusion: 313 mL    Norepinephrine: 9 mL    TPN (Total Parenteral Nutrition): 1560 mL  Total IN: 2355.6 mL    OUT:    Colostomy (mL): 100 mL    Indwelling Catheter - Urethral (mL): 1040 mL    Nasogastric/Oral tube (mL): 500 mL    VAC (Vacuum Assisted Closure) System (mL): 450 mL  Total OUT: 2090 mL    Total NET: 265.6 mL      21 Feb 2023 07:01  -  21 Feb 2023 11:27  --------------------------------------------------------  IN:    Fat Emulsion (Fish Oil &amp; Plant Based) 20% Infusion: 62.6 mL    TPN (Total Parenteral Nutrition): 195 mL  Total IN: 257.6 mL    OUT:    Colostomy (mL): 20 mL    Dexmedetomidine: 0 mL    Indwelling Catheter - Urethral (mL): 140 mL    Nasogastric/Oral tube (mL): 100 mL    VAC (Vacuum Assisted Closure) System (mL): 100 mL  Total OUT: 360 mL    Total NET: -102.4 mL          PHYSICAL EXAM:    PHYSICAL EXAM:    General/Neuro: RASS -1. Intubated, sedated.     Cardiovascular : S1, S2.  Regular rate and rhythm.    GI: Abdomen:  Soft, nontender, mildly distended abdomen. No rigidity, guarding, or rebound tenderness. Abthera vac in place. L sided colostomy, pink and patent, with bowel sweat noted. NGT in place    MSK/Extremities: Warm & well-perfused. Peripheral pulses intact.     Derm: Good skin turgor, no skin breakdown.      :       Donis catheter in place.      CXR:     LABS:  CAPILLARY BLOOD GLUCOSE      POCT Blood Glucose.: 168 mg/dL (21 Feb 2023 11:22)  POCT Blood Glucose.: 176 mg/dL (21 Feb 2023 05:35)  POCT Blood Glucose.: 160 mg/dL (20 Feb 2023 23:19)  POCT Blood Glucose.: 186 mg/dL (20 Feb 2023 17:25)                          6.6    14.47 )-----------( 330      ( 21 Feb 2023 05:11 )             21.7       02-21    147<H>  |  117<H>  |  28<H>  ----------------------------<  163<H>  4.4   |  22  |  0.7    Ca    7.1<L>      21 Feb 2023 04:12  Phos  2.8     02-21  Mg     2.1     02-21        PT/INR - ( 21 Feb 2023 04:12 )   PT: 11.00 sec;   INR: 0.97 ratio         PTT - ( 21 Feb 2023 04:12 )  PTT:25.9 sec

## 2023-02-21 NOTE — PROGRESS NOTE ADULT - ATTENDING COMMENTS
doing well. management per icu. possible return to OR today/tomorrow for fixing vac, washout/closure. dirupale.

## 2023-02-21 NOTE — CHART NOTE - NSCHARTNOTEFT_GEN_A_CORE
Post Operative Note  Patient: RUBIO HUGHES 82y (1940) Female   MRN: [id number]  Location: [address]  Visit: 02-10-23 Inpatient  Date: 02-21-23 @ 23:24    Procedure: Abdominal washout performed, unable to approximate fascia given significant small bowel edema, Abthera replaced    Subjective:   Patient brought back from the OR. Remains intubated and sedated. Operative procedure and findings above.   EBL 0cc; Intake 400mL; OR time was 30mins    Objective:  Vitals: T(F): 98.9 (02-21-23 @ 16:00), Max: 99 (02-21-23 @ 12:00)  HR: 82 (02-21-23 @ 22:59)  BP: 147/57 (02-21-23 @ 22:59) (122/58 - 152/63)  RR: 22 (02-21-23 @ 22:59)  SpO2: 98% (02-21-23 @ 22:59)  Vent Settings: Mode: AC/ CMV (Assist Control/ Continuous Mandatory Ventilation), RR (machine): 10, TV (machine): 450, FiO2: 40, PEEP: 5, MAP: 10, PIP: 20    In:   02-20-23 @ 07:01  -  02-21-23 @ 07:00  --------------------------------------------------------  IN: 2355.6 mL    02-21-23 @ 07:01  -  02-21-23 @ 23:24  --------------------------------------------------------  IN: 1738.2 mL      IV Fluids: Parenteral Nutrition - Adult 1 Each (65 mL/Hr) TPN Continuous <Continuous>  Parenteral Nutrition - Adult 1 Each (65 mL/Hr) TPN Continuous <Continuous>      Out:   02-20-23 @ 07:01  -  02-21-23 @ 07:00  --------------------------------------------------------  OUT: 2090 mL    02-21-23 @ 07:01  -  02-21-23 @ 23:24  --------------------------------------------------------  OUT: 2390 mL      EBL:   02-21-23 @ 07:01  -  02-21-23 @ 23:24  --------------------------------------------------------  OUT: 0 mL      Voided Urine:   02-20-23 @ 07:01  -  02-21-23 @ 07:00  --------------------------------------------------------  OUT: 2090 mL    02-21-23 @ 07:01  -  02-21-23 @ 23:24  --------------------------------------------------------  OUT: 2390 mL      Rodriguez Catheter: yes no   Drains:   JESSICA:   02-20-23 @ 07:01  -  02-21-23 @ 07:00  --------------------------------------------------------  OUT: 450 mL    02-21-23 @ 07:01  -  02-21-23 @ 23:24  --------------------------------------------------------  OUT: 300 mL     ,   Chest Tube:      NG Tube:   02-20-23 @ 07:01  -  02-21-23 @ 07:00  --------------------------------------------------------  OUT: 500 mL    02-21-23 @ 07:01  -  02-21-23 @ 23:24  --------------------------------------------------------  OUT: 350 mL        Physical Examination:  General Appearance: Sedated and intubated  HEENT: ETT in place  Heart: Reg rate  Lungs: Intubated and Mechanical ventilation  Abdomen:  Softly distended, abthera in place.  : rodriguez in place  Skin: Warm, dry.     Medications: [Standing]  aspirin Suppository 300 milliGRAM(s) Rectal <User Schedule>  benzocaine 20% Spray 1 Spray(s) Topical once  cefiderocol IVPB 1500 milliGRAM(s) IV Intermittent every 8 hours  chlorhexidine 0.12% Liquid 15 milliLiter(s) Oral Mucosa every 12 hours  chlorhexidine 2% Cloths 1 Application(s) Topical <User Schedule>  dexMEDEtomidine Infusion 0.05 MICROgram(s)/kG/Hr IV Continuous <Continuous>  fentaNYL    Injectable 25 MICROGram(s) IV Push every 4 hours PRN  heparin   Injectable 5000 Unit(s) SubCutaneous every 8 hours  ondansetron Injectable 4 milliGRAM(s) IV Push every 6 hours PRN  pantoprazole  Injectable 40 milliGRAM(s) IV Push every 24 hours  Parenteral Nutrition - Adult 1 Each TPN Continuous <Continuous>  Parenteral Nutrition - Adult 1 Each TPN Continuous <Continuous>    Medications: [PRN]  aspirin Suppository 300 milliGRAM(s) Rectal <User Schedule>  benzocaine 20% Spray 1 Spray(s) Topical once  cefiderocol IVPB 1500 milliGRAM(s) IV Intermittent every 8 hours  chlorhexidine 0.12% Liquid 15 milliLiter(s) Oral Mucosa every 12 hours  chlorhexidine 2% Cloths 1 Application(s) Topical <User Schedule>  dexMEDEtomidine Infusion 0.05 MICROgram(s)/kG/Hr IV Continuous <Continuous>  fentaNYL    Injectable 25 MICROGram(s) IV Push every 4 hours PRN  heparin   Injectable 5000 Unit(s) SubCutaneous every 8 hours  ondansetron Injectable 4 milliGRAM(s) IV Push every 6 hours PRN  pantoprazole  Injectable 40 milliGRAM(s) IV Push every 24 hours  Parenteral Nutrition - Adult 1 Each TPN Continuous <Continuous>  Parenteral Nutrition - Adult 1 Each TPN Continuous <Continuous>    Labs:                        8.7    20.01 )-----------( 379      ( 21 Feb 2023 12:04 )             28.7     02-21    147<H>  |  117<H>  |  28<H>  ----------------------------<  163<H>  4.4   |  22  |  0.7    Ca    7.1<L>      21 Feb 2023 04:12  Phos  2.8     02-21  Mg     2.1     02-21    TPro  3.9<L>  /  Alb  1.6<L>  /  TBili  0.6  /  DBili  0.4<H>  /  AST  63<H>  /  ALT  28  /  AlkPhos  134<H>  02-21    PT/INR - ( 21 Feb 2023 04:12 )   PT: 11.00 sec;   INR: 0.97 ratio       PTT - ( 21 Feb 2023 04:12 )  PTT:25.9 sec    Imaging:  No post-op imaging studies    Assessment:  82yFemale patient S/P abdominal washout and abthera replacement    Plan:  - Continue with critical care management  - follow up post-op labs and replete as necessary  - Monitor for bowel function  - Appreciate strict I/Os.      Date/Time: 02-21-23 @ 23:24

## 2023-02-21 NOTE — BRIEF OPERATIVE NOTE - OPERATION/FINDINGS
Abdominal washout performed, unable to approximate fascia given significant small bowel edema, abthera replaced Abdominal washout performed, unable to approximate fascia given significant small bowel edema, Abthera replaced

## 2023-02-21 NOTE — CHART NOTE - NSCHARTNOTEFT_GEN_A_CORE
PACU ANESTHESIA ADMISSION NOTE      Procedure: Exploratory laparotomy    Partial resection of sigmoid colon    Creation of colostomy of descending colon    Abdominal washout    Mobilization, splenic flexure    Second look laparotomy    Closure, temporary, abdominal cavity    Insertion, arterial line, percutaneous  left radial      Post op diagnosis:  Large bowel obstruction    Open wound of abdominal wall        __x__  Intubated  TV:__450____       Rate: 12______      FiO2: __ 50%____    ____  Patent Airway    ____  Full return of protective reflexes    ____  Full recovery from anesthesia / back to baseline     Vitals:   T:    98       R:  12                BP:       126/67            Sat:        99%           P:  83      Mental Status:  __x__ Awake   _____ Alert   _____ Drowsy   _____ Sedated    Nausea/Vomiting:  __x__ NO  ______Yes,   See Post - Op Orders          Pain Scale (0-10):  _____    Treatment: ____ None    ___x_ See Post - Op/PCA Orders    Post - Operative Fluids:   ____ Oral   ___x_ See Post - Op Orders    Plan: Discharge:   ____Home       ___x__Floor     _____Critical Care    _____  Other:_________________    Comments:  Uneventful intraoperative course. No anesthesia issues or complications noted.  Patient stable upon arrival to PACU. Report given to RN. Discharge when criteria met.

## 2023-02-21 NOTE — PROGRESS NOTE ADULT - ATTENDING COMMENTS
Patient remains intubated.  Vac dressing with good seal.  Off Levo since yesterday am.  Had Lasix 40 mg yesterday with marginal response.  On TPN. Colostomy not functioning yet.    ASSESSMENT:  83 y/o female with Large Bowel Obstruction.  S/P Ex. Lap and Devon's procedure.  Temporary abdominal closure with Abthera.  Acute respiratory failure with hypoxia.  On mechanical ventilation.  At risk for hemodynamic instability.  Moderate protein malnutrition.    PLAN:  - on Precedex and Fentanyl for RASS -1  - on Vent support; follow ABG, FiO2 40%, PEEP 5; follow ABG  - keep MAP>65; hypervolemic - give Lasix iv postop  - on TPN  - follow serum electrolytes and UOP  - on Cefiderocol as per ID Klebsiella PNA  - DVT prophylaxis  Plan for OR today to attempt abdominal wall closure

## 2023-02-21 NOTE — PROGRESS NOTE ADULT - ASSESSMENT
Assessment and Plan:   82y Female  2/19: abdominal washout, no further resection   2/16: Previous cecetomy site noted to be ischemic, omental patch repair, rest of bowel no ischemic changes, bowel edematous, abthera vac in place, abdomen open  2/14: s/p Sigmoid colectomy with ostomy, sigmoid mass resection for SBO/LBO.     NEURO:  #Acute pain    -APAP IV prn    -Fentanyl 25 mcg prn q4  #Acute sedation    -Precedex infusion    RESP:   #Oxygenation  RR (machine): 10, TV (machine): 450, FiO2: 40, PEEP: 5  ABG  -ET tube lip line 21, ETT advanced 2/17 for 1cm to 22cm  -Daily CXR     CARDS:   #hypotension 2/2 hypovolemic shock-resolved  -Prev on levophed, off since 10:30 AM 2/20  -#Imaging/labs  Echo 2/18:  elevated LA pressures, Lv not well visualized suspect normal lv function, suggest lumason, distal anteroseptal hypokinesis, sclerotic Aov  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  - f/u pathology  - 3L suctioned out of small bowel, 1.2L removed from NGT intraop  - RTOR 2/16: Previous cecetomy site noted to be ischemic, omental patch repair, rest of bowel no ischemic changes, bowel edematous, abthera vac in place, abdomen open,  -RTOR 2/19: Exploratory laparotomy, Abdominal washout, attempted closure of abdominal wound- superior portion fascia partially closed with sutures.  attempts to close lower portion resulted in elevated peak airway pressures. Application of abthera vac dressing  -RTOR as add-on 2/21 for abdominal washout and replacement of abthera   - TPN  @ 65/hr   #Diet, NPO, NGT in place to suction    -aspiration precautions, HOB 30  #GI Prophylaxis    -Pantoprazole  #Bowel regimen    -HOLDING    /RENAL:   #urine output in critically ill    -indwelling rodriguez (placed 2/14/2023)  #GERA  (baseline Cr 0.8) > resolved     -peak 1.4    Monitor UO-rodriguez in place    Current Rx:     Labs:          BUN/Cr- 22/0.5  -->,  25/0.6  -->          Electrolytes-Na 143 // K 4.1 // Mg 1.9 //  Phos 3.1 (02-20 @ 06:38)        HEME/ONC:     DVT prophylaxis-heparin   Injectable, SCDs    Labs: Hb/Hct:  7.5/23.8  -->,  7.9/25.3  -->                      Plts:  331  -->,  454  -->                 PTT/INR:        ID:  WBC- 10.51  --->>,  10.53  --->>,  16.33  --->>  tmax 100.6  Antibiotics-cefiderocol IVPB 2000 every 8 hours  ID following     Cultures:  (collected 02-15)  Source: ET Tube  Final Report:    Moderate Klebsiella pneumoniae (Carbapenem Resistant)  (collected 02-15)  Source: .Blood Blood  Preliminary Report:    No growth to date.    (collected 02-14)  Source: .Body Fluid peritoneal  Preliminary Report:    No growth to date.      ENDO:    -FSG q6 while NPO    -Glucose goal 140-180    -if above 180 start ISS     HA1C     LINES/DRAINS:  PIV, Rodriguez, Left Radial A line, ETT, Abdominal abthera vac, NGT    ADVANCED DIRECTIVES:  Full Code    INDICATION FOR SICU: HEMODYNAMIC MONITORING POSTOPERATIVE REQUIRING VASOPRESSORS AND MECHANICAL VENTILATION     Dispo: SICU  Assessment and Plan:   82y Female  2/19: abdominal washout, no further resection   2/16: Previous cecetomy site noted to be ischemic, omental patch repair, rest of bowel no ischemic changes, bowel edematous, abthera vac in place, abdomen open  2/14: s/p Sigmoid colectomy with ostomy, sigmoid mass resection for SBO/LBO.     NEURO:  #Acute pain    -APAP IV prn    -Fentanyl 25 mcg prn q4  #Acute sedation    -Precedex infusion    RESP:   #Oxygenation  RR (machine): 10, TV (machine): 450, FiO2: 40, PEEP: 5  ABG  -ET tube lip line 21, ETT advanced 2/17 for 1cm to 22cm  -Daily CXR     CARDS:   #hypotension 2/2 hypovolemic shock-resolved  -Prev on levophed, off since 10:30 AM 2/20  -#Imaging/labs  Echo 2/18:  elevated LA pressures, Lv not well visualized suspect normal lv function, suggest lumason, distal anteroseptal hypokinesis, sclerotic Aov  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  - f/u pathology  - 3L suctioned out of small bowel, 1.2L removed from NGT intraop  - RTOR 2/16: Previous cecetomy site noted to be ischemic, omental patch repair, rest of bowel no ischemic changes, bowel edematous, abthera vac in place, abdomen open,  -RTOR 2/19: Exploratory laparotomy, Abdominal washout, attempted closure of abdominal wound- superior portion fascia partially closed with sutures.  attempts to close lower portion resulted in elevated peak airway pressures. Application of abthera vac dressing  -RTOR as add-on 2/21 for abdominal washout and replacement of abthera   - TPN  @ 65/hr   #Diet, NPO, NGT in place to suction    -aspiration precautions, HOB 30  #GI Prophylaxis    -Pantoprazole  #Bowel regimen    -HOLDING    /RENAL:   #urine output in critically ill    -indwelling rodriguez (placed 2/14/2023)  -lasix 40 given for diuresis  #GERA  (baseline Cr 0.8) > resolved     -peak 1.4    Labs:          BUN/Cr- 25/0.6  -->,  28/0.7  -->          Electrolytes-Na 147 // K 4.4 // Mg 2.1 //  Phos 2.8 (02-21 @ 04:12          HEME/ONC:       DVT prophylaxis-heparin   Injectable, SCDs  #acute anemia in the setting of open abdomen    Labs: Hb/Hct:  6.7/21.9  -->,  6.6/21.7  -->       1UPRBC               Plts:  344  -->,  330  -->                 PTT/INR:  25.9/0.97  --->         ID:  WBC- 16.33  --->>,  14.82  --->>,  14.47  --->>  Temp trend- 24hrs T(F): 98.3 (02-21 @ 07:47), Max: 100.6 (02-20 @ 20:40)  Antibiotics-cefiderocol IVPB 2000 every 8 hours    Cultures:        (collected 02-15 @ 15:34)  Source: ET Tube ET Tube  Final Report:    Moderate Klebsiella pneumoniae (Carbapenem Resistant)    Normal Respiratory Latricia absent  Organism: Klepne MDRO  Klepne MDRO  Organism: Klepne MDRO  Organism: Klepne MDRO     (collected 02-15 @ 13:59)  Source: .Blood Blood  Final Report:    No Growth Final        ENDO:    -FSG q6 while NPO    -Glucose goal 140-180    -if above 180 start ISS     HA1C     LINES/DRAINS:  PIV, Rodriguez, Left Radial A line, ETT, Abdominal abthera vac, NGT    ADVANCED DIRECTIVES:  Full Code    INDICATION FOR SICU: HEMODYNAMIC MONITORING POSTOPERATIVE REQUIRING VASOPRESSORS AND MECHANICAL VENTILATION     Dispo: SICU

## 2023-02-21 NOTE — PHARMACOTHERAPY INTERVENTION NOTE - COMMENTS
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 
Recommended decreasing cefiderocol dose to 1.5g IV q8h given CrCl of ~49 mL/min.

## 2023-02-21 NOTE — BRIEF OPERATIVE NOTE - COMMENTS
- will require RTOR for abdominal re-exploration and possible closure - will require RTOR for abdominal re-exploration and possible closure  - dictation #[id number]

## 2023-02-22 NOTE — PROGRESS NOTE ADULT - SUBJECTIVE AND OBJECTIVE BOX
SURGERY PROGRESS NOTE     Patient: RUBIO HUGHES , 82y ([birth year])Female   MRN: [id number]  Location: [address]  Visit: 02-10-23 Inpatient  Date: 02-22-23 @ 00:41       Events of past 24 hours:  Patient seen resting in bed s/p takeback to OR for vac change. No acute events overnight. 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), moderately  differentiated (G2)    PAST MEDICAL & SURGICAL HISTORY:  Atrial fibrillation      Hypertension      CVA (cerebrovascular accident)      HLD (hyperlipidemia)      GERD (gastroesophageal reflux disease)      History of cholecystectomy      History of hysterectomy      History of bladder suspension procedure           Vitals:   T(F): 98.9 (02-21-23 @ 16:00), Max: 99 (02-21-23 @ 12:00)  HR: 82 (02-21-23 @ 22:59)  BP: 147/57 (02-21-23 @ 22:59)  RR: 22 (02-21-23 @ 22:59)  SpO2: 98% (02-21-23 @ 22:59)  Mode: AC/ CMV (Assist Control/ Continuous Mandatory Ventilation), RR (machine): 10, TV (machine): 450, FiO2: 40, PEEP: 5, ITime: 1, MAP: 10, PIP: 20    Diet, NPO      Fluids:     I & O's:    02-20-23 @ 07:01  -  02-21-23 @ 07:00  --------------------------------------------------------  IN:    Dexmedetomidine: 473.6 mL    Fat Emulsion (Fish Oil &amp; Plant Based) 20% Infusion: 313 mL    Norepinephrine: 9 mL    TPN (Total Parenteral Nutrition): 1560 mL  Total IN: 2355.6 mL    OUT:    Colostomy (mL): 100 mL    Indwelling Catheter - Urethral (mL): 1040 mL    Nasogastric/Oral tube (mL): 500 mL    VAC (Vacuum Assisted Closure) System (mL): 450 mL  Total OUT: 2090 mL    Total NET: 265.6 mL      PHYSICAL EXAM:   General: Sedated, intubated  Cardiac: RRR, S1/S2 identified, nmrg  Respiratory: Intubated  Abdomen: Soft, moderately-distended  Musculoskeletal: FROM in b/l UE and LE  Neuro: Sensation grossly intact and equal throughout, no focal deficits  Skin: Warm/dry, normal color, no jaundice  Incision/wound: Abthera intact      MEDICATIONS  (STANDING):  aspirin Suppository 300 milliGRAM(s) Rectal <User Schedule>  benzocaine 20% Spray 1 Spray(s) Topical once  cefiderocol IVPB 1500 milliGRAM(s) IV Intermittent every 8 hours  chlorhexidine 0.12% Liquid 15 milliLiter(s) Oral Mucosa every 12 hours  chlorhexidine 2% Cloths 1 Application(s) Topical <User Schedule>  dexMEDEtomidine Infusion 0.05 MICROgram(s)/kG/Hr (1.03 mL/Hr) IV Continuous <Continuous>  heparin   Injectable 5000 Unit(s) SubCutaneous every 8 hours  pantoprazole  Injectable 40 milliGRAM(s) IV Push every 24 hours  Parenteral Nutrition - Adult 1 Each (65 mL/Hr) TPN Continuous <Continuous>  Parenteral Nutrition - Adult 1 Each (65 mL/Hr) TPN Continuous <Continuous>    MEDICATIONS  (PRN):  fentaNYL    Injectable 25 MICROGram(s) IV Push every 4 hours PRN Moderate Pain (4 - 6)  ondansetron Injectable 4 milliGRAM(s) IV Push every 6 hours PRN Nausea and/or Vomiting      DVT PROPHYLAXIS: heparin   Injectable 5000 Unit(s) SubCutaneous every 8 hours    GI PROPHYLAXIS: pantoprazole  Injectable 40 milliGRAM(s) IV Push every 24 hours    ANTICOAGULATION:   ANTIBIOTICS:  cefiderocol IVPB 1500 milliGRAM(s)        Isolation Precautions:     Isolation Type: CONTACT;  Indication for Isolation: CRE (02-21-23 @ 09:44)      LAB/STUDIES:  Labs:  CAPILLARY BLOOD GLUCOSE      POCT Blood Glucose.: 149 mg/dL (21 Feb 2023 17:50)  POCT Blood Glucose.: 168 mg/dL (21 Feb 2023 11:22)  POCT Blood Glucose.: 176 mg/dL (21 Feb 2023 05:35)                          8.7    20.01 )-----------( 379      ( 21 Feb 2023 12:04 )             28.7       Auto Neutrophil %: 82.6 % (02-21-23 @ 04:12)  Auto Immature Granulocyte %: 3.6 % (02-21-23 @ 04:12)    02-21    147<H>  |  117<H>  |  28<H>  ----------------------------<  163<H>  4.4   |  22  |  0.7      Magnesium, Serum: 2.1 mg/dL (02-21-23 @ 04:12)      LFTs:             3.9  | 0.6  | 63       ------------------[134     ( 21 Feb 2023 12:44 )  1.6  | 0.4  | 28          Lipase:x      Amylase:x         Blood Gas Arterial, Lactate: 0.90 mmol/L (02-21-23 @ 22:53)  Blood Gas Arterial, Lactate: 1.60 mmol/L (02-21-23 @ 03:32)  Blood Gas Arterial, Lactate: 1.50 mmol/L (02-20-23 @ 03:41)  Blood Gas Arterial, Lactate: 1.30 mmol/L (02-19-23 @ 03:35)    ABG - ( 21 Feb 2023 22:53 )  pH: 7.37  /  pCO2: 41    /  pO2: 85    / HCO3: 24    / Base Excess: -1.5  /  SaO2: 99.0            ABG - ( 21 Feb 2023 03:32 )  pH: 7.42  /  pCO2: 32    /  pO2: 89    / HCO3: 21    / Base Excess: -3.4  /  SaO2: 98.5            ABG - ( 20 Feb 2023 03:41 )  pH: 7.45  /  pCO2: 31    /  pO2: 113   / HCO3: 22    / Base Excess: -2.0  /  SaO2: 99.5              Coags:     11.00  ----< 0.97    ( 21 Feb 2023 04:12 )     25.9            Serum Pro-Brain Natriuretic Peptide: 822 pg/mL (02-14-23 @ 06:30)

## 2023-02-22 NOTE — PROGRESS NOTE ADULT - SUBJECTIVE AND OBJECTIVE BOX
RUBIO HUGHES  [id number]  82y Female    Indication for ICU admission: open abdomen s/p bowel resection for SBO  Admit Date: 2/9  ICU Date:  2/14  OR Date: 2/14, 2/16    No Known Allergies    PAST MEDICAL & SURGICAL HISTORY:  Atrial fibrillation  Hypertension  CVA (cerebrovascular accident)  HLD (hyperlipidemia)  GERD (gastroesophageal reflux disease)  History of cholecystectomy  History of hysterectomy  History of bladder suspension procedure      Home Medications:      24HRS EVENT:  2/21   OV  s/p  Abdominal washout performed, unable to approximate fascia given significant small bowel edema, Abthera replaced  EBL 0cc; Intake 400mL; OR time was 30mins      DAY  -start TF per primary team?  - Lasix 40 goal -500 to -1L--->200-300ccs for 2 hours following   - OR today with Dr. 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  [ x] Due to altered mental status/intubation, subjective information were not able to be obtained from the patient. History was obtained, to the extent possible, from review of the chart and collateral sources of information    ********************************************************************************************************   RUBIO HUGHES  [id number]  82y Female    Indication for ICU admission: open abdomen s/p bowel resection for SBO  Admit Date: 2/9  ICU Date:  2/14  OR Date: 2/14, 2/16    No Known Allergies    PAST MEDICAL & SURGICAL HISTORY:  Atrial fibrillation  Hypertension  CVA (cerebrovascular accident)  HLD (hyperlipidemia)  GERD (gastroesophageal reflux disease)  History of cholecystectomy  History of hysterectomy  History of bladder suspension procedure      Home Medications:      24HRS EVENT:  2/21   OV  s/p  Abdominal washout performed, unable to approximate fascia given significant small bowel edema, Abthera replaced  EBL 0cc; Intake 400mL; OR time was 30mins      DAY  -start TF per primary team?  - Lasix 40 goal -500 to -1L--->200-300ccs for 2 hours following   - OR today with Dr. 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  [ x] Due to altered mental status/intubation, subjective information were not able to be obtained from the patient. History was obtained, to the extent possible, from review of the chart and collateral sources of information    ********************************************************************************************************  Daily     Daily     Diet, NPO after Midnight:      NPO Start Date: 22-Feb-2023,   NPO Start Time: 23:59 (02-22-23 @ 10:08)  Diet, NPO with Tube Feed:   Tube Feeding Modality: Nasogastric  Osmolite 1.0 Deion  Continuous  Starting Tube Feed Rate mL per Hour: 10  Increase Tube Feed Rate by (mL): 10     Every 4 hours  Until Goal Tube Feed Rate (mL per Hour): 20  Tube Feed Duration (in Hours): 24  Tube Feed Start Time: 11:00 (02-22-23 @ 10:08)      CURRENT MEDS:  Neurologic Medications  aspirin Suppository 300 milliGRAM(s) Rectal <User Schedule>  dexMEDEtomidine Infusion 0.05 MICROgram(s)/kG/Hr IV Continuous <Continuous>  ondansetron Injectable 4 milliGRAM(s) IV Push every 6 hours PRN Nausea and/or Vomiting    Respiratory Medications    Cardiovascular Medications    Gastrointestinal Medications  pantoprazole  Injectable 40 milliGRAM(s) IV Push every 24 hours  Parenteral Nutrition - Adult 1 Each TPN Continuous <Continuous>    Genitourinary Medications    Hematologic/Oncologic Medications  heparin   Injectable 5000 Unit(s) SubCutaneous every 8 hours    Antimicrobial/Immunologic Medications  cefiderocol IVPB 1500 milliGRAM(s) IV Intermittent every 8 hours    Endocrine/Metabolic Medications    Topical/Other Medications  benzocaine 20% Spray 1 Spray(s) Topical once  chlorhexidine 0.12% Liquid 15 milliLiter(s) Oral Mucosa every 12 hours  chlorhexidine 2% Cloths 1 Application(s) Topical <User Schedule>      ICU Vital Signs Last 24 Hrs  T(C): 36.6 (22 Feb 2023 08:00), Max: 37.2 (21 Feb 2023 12:00)  T(F): 97.9 (22 Feb 2023 08:00), Max: 99 (21 Feb 2023 12:00)  HR: 82 (22 Feb 2023 10:00) (62 - 82)  BP: 151/67 (22 Feb 2023 10:00) (113/53 - 159/66)  BP(mean): 97 (22 Feb 2023 10:00) (76 - 102)  ABP: --  ABP(mean): --  RR: 19 (22 Feb 2023 10:00) (12 - 23)  SpO2: 99% (22 Feb 2023 10:00) (95% - 100%)    O2 Parameters below as of 22 Feb 2023 10:00  Patient On (Oxygen Delivery Method): ventilator    O2 Concentration (%): 40          Mode: AC/ CMV (Assist Control/ Continuous Mandatory Ventilation)  RR (machine): 10  TV (machine): 350  FiO2: 40  PEEP: 5  ITime: 1  MAP: 10  PIP: 25    ABG - ( 22 Feb 2023 03:37 )  pH, Arterial: 7.49  pH, Blood: x     /  pCO2: 30    /  pO2: 163   / HCO3: 23    / Base Excess: 0.4   /  SaO2: 100.0               I&O's Summary    21 Feb 2023 07:01  -  22 Feb 2023 07:00  --------------------------------------------------------  IN: 2470.8 mL / OUT: 2975 mL / NET: -504.2 mL    22 Feb 2023 07:01  -  22 Feb 2023 11:45  --------------------------------------------------------  IN: 313.6 mL / OUT: 630 mL / NET: -316.4 mL      I&O's Detail    21 Feb 2023 07:01  -  22 Feb 2023 07:00  --------------------------------------------------------  IN:    Dexmedetomidine: 266.5 mL    Fat Emulsion (Fish Oil &amp; Plant Based) 20% Infusion: 344.3 mL    PRBCs (Packed Red Blood Cells): 300 mL    TPN (Total Parenteral Nutrition): 1560 mL  Total IN: 2470.8 mL    OUT:    Colostomy (mL): 220 mL    Indwelling Catheter - Urethral (mL): 2105 mL    Nasogastric/Oral tube (mL): 350 mL    VAC (Vacuum Assisted Closure) System (mL): 300 mL  Total OUT: 2975 mL    Total NET: -504.2 mL      22 Feb 2023 07:01  -  22 Feb 2023 11:45  --------------------------------------------------------  IN:    Dexmedetomidine: 18.6 mL    IV PiggyBack: 100 mL    TPN (Total Parenteral Nutrition): 195 mL  Total IN: 313.6 mL    OUT:    Colostomy (mL): 0 mL    Indwelling Catheter - Urethral (mL): 230 mL    Nasogastric/Oral tube (mL): 100 mL    VAC (Vacuum Assisted Closure) System (mL): 300 mL  Total OUT: 630 mL    Total NET: -316.4 mL        PHYSICAL EXAM:  General/Neuro: RASS -1. Intubated, sedated.     Cardiovascular : S1, S2.  Regular rate and rhythm.    GI: Abdomen:  Soft, nontender, mildly distended abdomen. No rigidity, guarding, or rebound tenderness. Abthera vac in place. L sided colostomy, pink, edematous, and patent with bowel sweat noted. NGT in place    MSK/Extremities: Warm & well-perfused. Peripheral pulses intact.     Derm: Good skin turgor, no skin breakdown.      :       Donis catheter in place.      CXR:     LABS:  CAPILLARY BLOOD GLUCOSE      POCT Blood Glucose.: 122 mg/dL (22 Feb 2023 07:02)  POCT Blood Glucose.: 149 mg/dL (21 Feb 2023 17:50)                          7.8    14.29 )-----------( 380      ( 22 Feb 2023 05:15 )             25.3       02-22    142  |  111<H>  |  30<H>  ----------------------------<  110<H>  3.9   |  23  |  0.6<L>    Ca    7.1<L>      22 Feb 2023 05:15  Phos  2.5     02-22  Mg     2.1     02-22    TPro  3.9<L>  /  Alb  1.6<L>  /  TBili  0.6  /  DBili  0.4<H>  /  AST  63<H>  /  ALT  28  /  AlkPhos  134<H>  02-21      PT/INR - ( 21 Feb 2023 04:12 )   PT: 11.00 sec;   INR: 0.97 ratio         PTT - ( 21 Feb 2023 04:12 )  PTT:25.9 sec

## 2023-02-22 NOTE — PROGRESS NOTE ADULT - ATTENDING COMMENTS
Patient went to OR last night for abdominal washout and Vac change.  Intubated.  Arousable.  Had Lasix 40 mg iv with good response     ASSESSMENT:  81 y/o female with Large Bowel Obstruction.  S/P Ex. Lap and Devon's procedure.  Temporary abdominal closure with Abthera.  Acute respiratory failure with hypoxia.  On mechanical ventilation.  Hypervolemia.  At risk for hemodynamic instability.  Moderate protein malnutrition.    PLAN:  - sedation with Precedex and Fentanyl for RASS -1; do SAT today  - on Vent support; follow ABG, FiO2 40%, PEEP 5; follow ABG  - hypervolemic - give Lasix iv postop  - on TPN  - ok to start trickle feeds at 20 mg/h  - follow serum electrolytes and UOP  - on Cefiderocol as per ID Klebsiella PNA  - ID f/u needed  - DVT prophylaxis  Family updated at bedside

## 2023-02-22 NOTE — PROGRESS NOTE ADULT - ASSESSMENT
Assessment and Plan:   82y Female  2/21: abd washout, abthera placed   2/19: abdominal washout, no further resection   2/16: Previous cecetomy site noted to be ischemic, omental patch repair, rest of bowel no ischemic changes, bowel edematous, abthera vac in place, abdomen open  2/14: s/p Sigmoid colectomy with ostomy, sigmoid mass resection for SBO/LBO.     NEURO:  #Acute pain    -APAP IV prn    -Fentanyl 25 mcg prn q4  #Acute sedation    -Precedex infusion    RESP:   #Oxygenation  RR (machine): 10, TV (machine): 450, FiO2: 40, PEEP: 5  ABG  -ET tube lip line 21, ETT advanced 2/17 for 1cm to 22cm  -Daily CXR     CARDS:   #hypotension 2/2 hypovolemic shock-resolved  -Prev on levophed, off since 10:30 AM 2/20  -#Imaging/labs  Echo 2/18:  elevated LA pressures, Lv not well visualized suspect normal lv function, suggest lumason, distal anteroseptal hypokinesis, sclerotic Aov  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  - pathology -  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  attempts to close lower portion resulted in elevated peak airway pressures. Application of abthera vac dressing  -RTOR 2/21 for abdominal washout and replacement of abthera     #Diet, NPO, NGT in place to suction    -aspiration precautions, HOB 30  -TPN @65/hr  #GI Prophylaxis    -Pantoprazole  #Bowel regimen    -HOLDING    /RENAL:   #urine output in critically ill    -indwelling rodriguez (placed 2/14/2023)  -lasix 40 given for diuresis  #GERA  (baseline Cr 0.8) > resolved     -peak 1.4    Labs:          BUN/Cr- 25/0.6  -->,  28/0.7  -->          Electrolytes-Na 147 // K 4.4 // Mg 2.1 //  Phos 2.8 (02-21 @ 04:12        HEME/ONC:       DVT prophylaxis-heparin   Injectable, SCDs  #acute anemia in the setting of open abdomen    Labs: Hb/Hct:  6.6/21.7  -->1UPRBC-->  8.7/28.7  -->                      Plts:  330  -->,  379  -->                 PTT/INR:  25.9/0.97  --->         ID:  WBC- 16.33  --->>,  14.82  --->>,  14.47  --->>  afebrile  Antibiotics-cefiderocol IVPB 2000 every 8 hours    Cultures:        (collected 02-15 @ 15:34)  Source: ET Tube ET Tube  Final Report:    Moderate Klebsiella pneumoniae (Carbapenem Resistant)    Normal Respiratory Latricia absent  Organism: Klepne MDRO  Klepne MDRO  Organism: Klepne MDRO  Organism: Klepne MDRO     (collected 02-15 @ 13:59)  Source: .Blood Blood  Final Report:    No Growth Final        ENDO:    -FSG q6 while NPO    -Glucose goal 140-180    -if above 180 start ISS     HA1C     LINES/DRAINS:  PIV, Rodriguez, Left Radial A line, ETT, Abdominal abthera vac, NGT    ADVANCED DIRECTIVES:  Full Code    INDICATION FOR SICU: HEMODYNAMIC MONITORING POSTOPERATIVE REQUIRING VASOPRESSORS AND MECHANICAL VENTILATION     Dispo: SICU    Assessment and Plan:   82y Female  2/21: abd washout, abthera placed   2/19: abdominal washout, no further resection   2/16: Previous cecetomy site noted to be ischemic, omental patch repair, rest of bowel no ischemic changes, bowel edematous, abthera vac in place, abdomen open  2/14: s/p Sigmoid colectomy with ostomy, sigmoid mass resection for SBO/LBO.     NEURO:  #Acute pain    -APAP IV prn    -Fentanyl 25 mcg prn q4  #Acute sedation    -Precedex infusion    RESP:   #Oxygenation  RR (machine): 10, TV (machine): 350, FiO2: 40, PEEP: 5  02-22 @ 03:37--7.49 / 30 / 163 / 23 / 100.0  O2 100.0  Lac 1.50    02-21 @ 22:53--7.37 / 41 / 85 / 24 / 99.0  O2 99.0  Lac 0.90    -ET tube lip line 21, ETT advanced 2/17 for 1cm to 22cm  -Daily CXR     CARDS:   #hypotension 2/2 hypovolemic shock-resolved  -Prev on levophed, off since 10:30 AM 2/20  -#Imaging/labs  Echo 2/18:  elevated LA pressures, Lv not well visualized suspect normal lv function, suggest lumason, distal anteroseptal hypokinesis, sclerotic Aov  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  - pathology -  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  attempts to close lower portion resulted in elevated peak airway pressures. Application of abthera vac dressing  -RTOR 2/21 for abdominal washout and replacement of abthera   -possible RTOR for temporary mesh placement  #Diet  -starting TF @10/hr   -TPN @65/hr  #GI Prophylaxis    -Pantoprazole  #Bowel regimen    -HOLDING    /RENAL:   #urine output in critically ill    -indwelling rodriguez (placed 2/14/2023)  -lasix 40 given for diuresis  #GERA  (baseline Cr 0.8) > resolved     -peak 1.4     Labs:          BUN/Cr- 28/0.7  -->,  30/0.6  -->          Electrolytes-Na 142 // K 3.9 // Mg 2.1 //  Phos 2.5 (02-22 @ 05:15)      HEME/ONC:       DVT prophylaxis-heparin   Injectable, SCDs  #acute anemia in the setting of open abdomen      DVT prophylaxis-heparin   Injectable    Labs: Hb/Hct:  8.7/28.7  -->,  7.8/25.3  -->                      Plts:  379  -->,  380  -->                 PTT/INR:          ID:    WBC- 14.47  --->>,  20.01  --->>,  14.29  --->>  Temp trend- 24hrs T(F): 97.9 (02-22 @ 08:00), Max: 99 (02-21 @ 12:00)  Antibiotics-cefiderocol IVPB 1500 every 8 hours    Cultures:        (collected 02-15 @ 15:34)  Source: ET Tube ET Tube  Final Report:    Moderate Klebsiella pneumoniae (Carbapenem Resistant)    Normal Respiratory Latricia absent  Organism: Klepne MDRO  Klepne MDRO  Organism: Klepne MDRO  Organism: Klepne MDRO     (collected 02-15 @ 13:59)  Source: .Blood Blood  Final Report:    No Growth Final          ENDO:    -FSG q6 while NPO    -Glucose goal 140-180    -if above 180 start ISS     HA1C     LINES/DRAINS:  PIV, Rodriguez, Left Radial A line, ETT, Abdominal abthera vac, NGT    ADVANCED DIRECTIVES:  Full Code    INDICATION FOR SICU: HEMODYNAMIC MONITORING POSTOPERATIVE REQUIRING VASOPRESSORS AND MECHANICAL VENTILATION     Dispo: SICU

## 2023-02-22 NOTE — PROGRESS NOTE ADULT - ASSESSMENT
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

## 2023-02-23 NOTE — PROCEDURE NOTE - ATTENDING PROVIDER
Pt called for refill of:    Requested Prescriptions     Pending Prescriptions Disp Refills    rimegepant (NURTEC) 75 mg disintegrating tablet 8 Tablet 11     Sig: Take 1 tablet p.o. as needed for severe headache, not to exceed 1 tablet in 24 hours. Please send to [facility] at [address] [place] [address] Drive.  [phone]    NOV - 06/15/23 Dr. Walsh Few
Received Hello message, \"Please call re: rx for Nurtec, pharmacy cannot fill. \"
Dr. Rebollar

## 2023-02-23 NOTE — PROGRESS NOTE ADULT - ASSESSMENT
Assessment and Plan:   82y Female  2/21: abd washout, abthera placed   2/19: abdominal washout, no further resection   2/16: Previous cecetomy site noted to be ischemic, omental patch repair, rest of bowel no ischemic changes, bowel edematous, abthera vac in place, abdomen open  2/14: s/p Sigmoid colectomy with ostomy, sigmoid mass resection for SBO/LBO.     NEURO:  #Acute pain    -APAP IV prn    -Fentanyl 25 mcg prn q4  #Acute sedation    -Precedex infusion    RESP:   #Oxygenation  RR (machine): 10, TV (machine): 350, FiO2: 40, PEEP: 5  abg  -ET  22cm  -Daily CXR     CARDS:   #hypotension 2/2 hypovolemic shock-resolved  -Prev on levophed, off since 10:30 AM 2/20  -#Imaging/labs  Echo 2/18:  elevated LA pressures, Lv not well visualized suspect normal lv function, suggest lumason, distal anteroseptal hypokinesis, sclerotic Aov  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  - pathology -  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  attempts to close lower portion resulted in elevated peak airway pressures. Application of abthera vac dressing  -RTOR 2/21 for abdominal washout and replacement of abthera   -possible RTOR for temporary mesh placement  #Diet  -starting TF @10/hr   -TPN @65/hr  #GI Prophylaxis    -Pantoprazole  #Bowel regimen    -HOLDING    /RENAL:   #urine output in critically ill    -indwelling rodriguez (placed 2/14/2023)  -lasix 40 given for diuresis---> minimal response, redosed 60mg in pm   #GERA  (baseline Cr 0.8) > resolved     -peak 1.4    Labs:          BUN/Cr- 30/0.6  -->,  31/0.6  -->          Electrolytes-Na 143 // K 3.7 // Mg 2.1 //  Phos 2.6 (02-22 @ 17:00)        HEME/ONC:       DVT prophylaxis-heparin   Injectable, SCDs  #acute anemia in the setting of open abdomen      DVT prophylaxis-heparin   Injectable    Labs: Hb/Hct:  8.7/28.7  -->,  7.8/25.3  -->                      Plts:  379  -->,  380  -->                 PTT/INR:          ID:    WBC- 14.47  --->>,  20.01  --->>,  14.29  --->>  Temp trend- 24hrs T(F): 97.9 (02-22 @ 08:00), Max: 99 (02-21 @ 12:00)  Antibiotics-cefiderocol IVPB 1500 every 8 hours    Cultures:        (collected 02-15 @ 15:34)  Source: ET Tube ET Tube  Final Report:    Moderate Klebsiella pneumoniae (Carbapenem Resistant)    Normal Respiratory Latricia absent  Organism: Klepne MDRO  Klepne MDRO  Organism: Klepne MDRO  Organism: Klepne MDRO     (collected 02-15 @ 13:59)  Source: .Blood Blood  Final Report:    No Growth Final          ENDO:    -FSG q6 while NPO    -Glucose goal 140-180    -if above 180 start ISS     HA1C     LINES/DRAINS:  PIV, Rodriguez, Left Radial A line, ETT, Abdominal abthera vac, NGT    ADVANCED DIRECTIVES:  Full Code    INDICATION FOR SICU: HEMODYNAMIC MONITORING POSTOPERATIVE REQUIRING VASOPRESSORS AND MECHANICAL VENTILATION     Dispo: SICU  Assessment and Plan:   82y Female  2/21: abd washout, abthera placed   2/19: abdominal washout, no further resection   2/16: Previous cecetomy site noted to be ischemic, omental patch repair, rest of bowel no ischemic changes, bowel edematous, abthera vac in place, abdomen open  2/14: s/p Sigmoid colectomy with ostomy, sigmoid mass resection for SBO/LBO.     NEURO:  #Acute pain    -APAP IV prn    -Fentanyl 25 mcg prn q4  #Acute sedation    -Precedex infusion  #Code stroke   -CTH revealed patchy hypodensities in bilateral thalami and bilateral occipitotemporal regions like representing acute infarct/ischemic injury. No intracranial hemorrhage or midline shift.   -CTA: no LVO/AVM, left vertebral artery stenosis.  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  attempts to close lower portion resulted in elevated peak airway pressures. Application of abthera vac dressing  -RTOR 2/21 for abdominal washout and replacement of abthera   -possible RTOR for temporary mesh placement  #Diet  -TF @20/hr   -TPN @65/hr  #GI Prophylaxis    -Pantoprazole  #Bowel regimen    -HOLDING    /RENAL:   #urine output in critically ill    -indwelling rodriguez (placed 2/14/2023)  -lasix 40 given for diuresis---> minimal response, redosed 60mg in pm   #GERA  (baseline Cr 0.8) > resolved     -peak 1.4    Labs:          BUN/Cr- 31/0.6  -->,  31/0.6  -->          Electrolytes-Na 142 // K 4.0 // Mg 2.0 //  Phos 3.0 (02-23 @ 06:30)    HEME/ONC:       DVT prophylaxis-heparin   Injectable, SCDs  #acute anemia in the setting of open abdomen      DVT prophylaxis-heparin   Injectable    Labs: Hb/Hct:  7.8/25.3  -->,  7.5/24.7  -->                      Plts:  380  -->,  442  -->                 PTT/INR:  21.4/0.92  --->           ID:  WBC- 20.01  --->>,  14.29  --->>,  15.52  --->>  Temp trend- 24hrs T(F): 97.3 (02-23 @ 08:05), Max: 99.6 (02-22 @ 23:05)  Antibiotics-cefiderocol IVPB 1500 every 8 hours  Cultures:         ENDO:    -FSG q6 while NPO    -Glucose goal 140-180    -if above 180 start ISS     HA1C     LINES/DRAINS:  PIV, Rodriguez, Left Radial A line, ETT, Abdominal abthera vac, NGT    ADVANCED DIRECTIVES:  Full Code    INDICATION FOR SICU: HEMODYNAMIC MONITORING POSTOPERATIVE REQUIRING VASOPRESSORS AND MECHANICAL VENTILATION     Dispo: SICU  Assessment and Plan:   82y Female  2/21: abd washout, abthera placed   2/19: abdominal washout, no further resection   2/16: Previous cecetomy site noted to be ischemic, omental patch repair, rest of bowel no ischemic changes, bowel edematous, abthera vac in place, abdomen open  2/14: s/p Sigmoid colectomy with ostomy, sigmoid mass resection for SBO/LBO.     NEURO:  #Acute pain    -APAP IV prn    -Fentanyl 25 mcg prn q4  #Acute sedation    -Precedex infusion  #Code stroke 2/23   -CTH revealed patchy hypodensities in bilateral thalami and bilateral occipitotemporal regions like representing acute infarct/ischemic injury. No intracranial hemorrhage or midline shift.   -CTA: no LVO/AVM, left vertebral artery stenosis.  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  attempts to close lower portion resulted in elevated peak airway pressures. Application of abthera vac dressing  -RTOR 2/21 for abdominal washout and replacement of abthera   -Will not RTOR tomorrow due to patient's clinical status as per Dr. Rebollar   #Diet  -TF @20/hr   -TPN @65/hr  #GI Prophylaxis    -Pantoprazole  #Bowel regimen    -HOLDING    /RENAL:   #urine output in critically ill    -indwelling rodriguez (placed 2/14/2023)  -lasix 40 given for diuresis---> minimal response, redosed 60mg in pm   #GERA  (baseline Cr 0.8) > resolved     -peak 1.4    Labs:          BUN/Cr- 31/0.6  -->,  31/0.6  -->          Electrolytes-Na 142 // K 4.0 // Mg 2.0 //  Phos 3.0 (02-23 @ 06:30)    HEME/ONC:       DVT prophylaxis-holding   Holding anticoagulation at this time   #acute anemia in the setting of open abdomen      DVT prophylaxis-heparin   Injectable    Labs: Hb/Hct:  7.8/25.3  -->,  7.5/24.7  -->                      Plts:  380  -->,  442  -->                 PTT/INR:  21.4/0.92  --->           ID:  WBC- 20.01  --->>,  14.29  --->>,  15.52  --->>  Temp trend- 24hrs T(F): 97.3 (02-23 @ 08:05), Max: 99.6 (02-22 @ 23:05)  Antibiotics-cefiderocol IVPB 1500 every 8 hours  Cultures:         ENDO:    -FSG q6 while NPO    -Glucose goal 140-180    -if above 180 start ISS     HA1C     LINES/DRAINS:  PIV, Rodriguez, Left Radial A line, ETT, Abdominal abthera vac, NGT    ADVANCED DIRECTIVES:  Full Code    INDICATION FOR SICU: HEMODYNAMIC MONITORING POSTOPERATIVE REQUIRING VASOPRESSORS AND MECHANICAL VENTILATION     Dispo: SICU

## 2023-02-23 NOTE — PROGRESS NOTE ADULT - ATTENDING COMMENTS
SAT performed this am and patient became awake but unable to move or communicate.  Code Stroke was called.  CT Head showed B/l Thalami and Occipitotemporal Brain Infarcts.  Neuro surgery consulted.  Planned OR was canceled today. SAT performed this am and patient became awake but unable to move or communicate.  Code Stroke was called.  CT Head showed B/l Thalami and Occipitotemporal Brain Infarcts.  Neuro surgery consulted.  Planned OR was canceled today.    ASSESSMENT:  81 y/o female with Large Bowel Obstruction.  S/P Ex. Lap and Devon's procedure.  Temporary abdominal closure with Abthera.  B/l Thalami and Occipitotemporal Brain Acute Infarcts.  Acute respiratory failure with hypoxia.  On mechanical ventilation.  Hypervolemia.  At risk for hemodynamic instability.  Moderate protein malnutrition.    PLAN:  - sedation as needed  - Neurology eval and management for acute stroke   - neuro checks   - repeat CT Head in 12 hours  - no need for active anticoagulation at this time as per Neurology  - urgent Cardiology eval for possible BETO - recommended TTE with bubble study  - on ASA per rectum  - continue Vent support; follow ABG  - keep MAP>65  - on TPN; advance trickle feeds to goal  - follow serum electrolytes and UOP  - on Cefiderocol as per ID Klebsiella PNA  - ID f/  - DVT prophylaxis

## 2023-02-23 NOTE — CHART NOTE - NSCHARTNOTEFT_GEN_A_CORE
INCOMPLETE At approximately 8:50 am, SICU team received a call from nurse that patient was unarousable, unable to follow commands, with possible concern for stroke. I immediately came and assessed patient bedside alongside ERNESTINA Godoy, NIH score calculated as 20. Patient examined bedside, neurologic exam with pupils briskly reactive to light, blinks eyes to traction but unable to follow commands, requiring repeated stimuli to arouse, no movement of b/l upper and lower extremities on motor drift,     INCOMPLETE At approximately 8:50 am, SICU team received a call from patient's nurse that patient was unarousable, unable to follow commands, with possible concern for stroke. I immediately came and assessed patient bedside alongside ERNESTINA Godoy. Patient examined bedside, pupils briskly reactive to light, blinks eyes to threat but unable to follow commands, unrepsonsive to repeated noxious stimuli, b/l upper and lower extremity falls on motor drift, severe sensory loss noted. NIH score calculated as 23. Stroke code immediately called and protocol initiated. Out of the window for IV thrombolytics as per neurology, decision made to obtain CT angio head with IV contrast, CT brain perfusion, and CT angio neck with IV contrast. SICU attending, Dr. Rebollar aware, and patient's family aware.     CTH revealed patchy hypodensities in bilateral thalami and bilateral occipitotemporal regions like representing acute infarct/ischemic injury. No intracranial hemorrhage or midline shift.   CTA: no LVO/AVM, left vertebral artery stenosis.    As per neurology, etiology unclear at this time, differentials include embolic or flow related ischemic strokes, infectious or metastatic etiology as well.      Patient will continue to be monitored in the SICU for q1 neuro checks. Will follow neurology recs: pending repeat CT head later tonight, holding anticoagulation at this time. Will f/u TTE, VEEG, and repeat CT head. RTOR canceled for tomorrow due to patient's clinical status as per Dr. Rebollar. At approximately 8:50 am, SICU team received a call from patient's nurse that patient was unarousable, unable to follow commands, with possible concern for stroke upon course of SAT trial. I immediately came and assessed patient bedside alongside ERNESTINA Godoy. Patient examined bedside, pupils briskly reactive to light, blinks eyes to threat but unable to follow commands, unrepsonsive to repeated noxious stimuli, b/l upper and lower extremity falls on motor drift, severe sensory loss noted in b/l upper and lower extremities. NIH score calculated as 23. Stroke code immediately called and protocol initiated. Out of the window for IV thrombolytics as per neurology, decision made to obtain CT angio head with IV contrast, CT brain perfusion, and CT angio neck with IV contrast. SICU attending, Dr. Rebollar aware, and patient's family aware.     CTH revealed patchy hypodensities in bilateral thalami and bilateral occipitotemporal regions like representing acute infarct/ischemic injury. No intracranial hemorrhage or midline shift.   CTA: no LVO/AVM, left vertebral artery stenosis.    As per neurology, etiology unclear at this time, differentials include embolic or flow related ischemic strokes, infectious or metastatic etiology as well.      Patient will continue to be monitored in the SICU for q1 neuro checks. Will follow neurology recs: pending repeat CT head later tonight, holding anticoagulation at this time. Will f/u TTE, VEEG, and repeat CT head. RTOR canceled for tomorrow due to patient's clinical status as per Dr. Rebollar.

## 2023-02-23 NOTE — PROGRESS NOTE ADULT - ASSESSMENT
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    Plan:  s/p RTOR today 2/23   Ok for TF  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), moderately  differentiated (G2)  Continue care per SICU team  Wean vent as tolerated  Wean pressors as tolerated  Monitor UOP  Monitor NGT output  Strict I/Os  Monitor ostomy output    Trauma/ACS  Spectra [id number]

## 2023-02-23 NOTE — PROGRESS NOTE ADULT - SUBJECTIVE AND OBJECTIVE BOX
RUBIO HUGHES  [id number]  82y Female    Indication for ICU admission: open abdomen s/p bowel resection for SBO  Admit Date:   ICU Date:    OR Date: ,     No Known Allergies    PAST MEDICAL & SURGICAL HISTORY:  Atrial fibrillation  Hypertension  CVA (cerebrovascular accident)  HLD (hyperlipidemia)  GERD (gastroesophageal reflux disease)  History of cholecystectomy  History of hysterectomy  History of bladder suspension procedure      Home Medications:      24HRS EVENT:    Ov  Lasix 60mg x1 @10pm (neg 900cc @ 2am)      DAY  -decrease TV to 350   -40cc Lasix x1-->minimal response--> 60 lasix x 1 in  -trickle feeds 20  OR tomorrow  -afternoon AB.46/30/163/23   -K and Franks repleted        REVIEW OF SYSTEMS  [ ] A ten-point review of systems was otherwise negative except as noted.  [ x] Due to altered mental status/intubation, subjective information were not able to be obtained from the patient. History was obtained, to the extent possible, from review of the chart and collateral sources of information    ********************************************************************************************************     RUBIO HUGHES  [id number]  82y Female    Indication for ICU admission: open abdomen s/p bowel resection for SBO  Admit Date:   ICU Date:    OR Date: ,     No Known Allergies    PAST MEDICAL & SURGICAL HISTORY:  Atrial fibrillation  Hypertension  CVA (cerebrovascular accident)  HLD (hyperlipidemia)  GERD (gastroesophageal reflux disease)  History of cholecystectomy  History of hysterectomy  History of bladder suspension procedure      Home Medications:      24HRS EVENT:    Ov  Lasix 60mg x1 @10pm (neg 900cc @ 2am)      DAY  -decrease TV to 350   -40cc Lasix x1-->minimal response--> 60 lasix x 1 in  -trickle feeds 20  OR tomorrow  -afternoon AB.46/30/163/23   -K and Franks repleted        REVIEW OF SYSTEMS  [ ] A ten-point review of systems was otherwise negative except as noted.  [ x] Due to altered mental status/intubation, subjective information were not able to be obtained from the patient. History was obtained, to the extent possible, from review of the chart and collateral sources of information    ********************************************************************************************************  Daily     Daily   Diet, NPO after Midnight:      NPO Start Date: 2023,   NPO Start Time: 23:59  Except Medications (23 @ 19:59)  Diet, NPO after Midnight:      NPO Start Date: 2023,   NPO Start Time: 23:59 (23 @ 10:08)  Diet, NPO with Tube Feed:   Tube Feeding Modality: Nasogastric  Osmolite 1.0 Deion  Continuous  Starting Tube Feed Rate mL per Hour: 10  Increase Tube Feed Rate by (mL): 10     Every 4 hours  Until Goal Tube Feed Rate (mL per Hour): 20  Tube Feed Duration (in Hours): 24  Tube Feed Start Time: 11:00 (23 @ 10:08)    CURRENT MEDS:  Neurologic Medications  aspirin Suppository 300 milliGRAM(s) Rectal <User Schedule>  dexMEDEtomidine Infusion 0.05 MICROgram(s)/kG/Hr IV Continuous <Continuous>  ondansetron Injectable 4 milliGRAM(s) IV Push every 6 hours PRN Nausea and/or Vomiting    Respiratory Medications    Cardiovascular Medications    Gastrointestinal Medications  fat emulsion (Fish Oil and Plant Based) 20% Infusion 1.2183 Gm/kG/Day IV Continuous <Continuous>  pantoprazole  Injectable 40 milliGRAM(s) IV Push every 24 hours  Parenteral Nutrition - Adult 1 Each TPN Continuous <Continuous>  potassium phosphate IVPB 15 milliMole(s) IV Intermittent once    Genitourinary Medications    Hematologic/Oncologic Medications  heparin   Injectable 5000 Unit(s) SubCutaneous every 8 hours    Antimicrobial/Immunologic Medications  cefiderocol IVPB 1500 milliGRAM(s) IV Intermittent every 8 hours    Endocrine/Metabolic Medications    Topical/Other Medications  benzocaine 20% Spray 1 Spray(s) Topical once  chlorhexidine 0.12% Liquid 15 milliLiter(s) Oral Mucosa every 12 hours  chlorhexidine 2% Cloths 1 Application(s) Topical <User Schedule>    ICU Vital Signs Last 24 Hrs  T(C): 37 (2023 04:00), Max: 37.6 (2023 23:05)  T(F): 98.6 (2023 04:00), Max: 99.6 (2023 23:05)  HR: 70 (2023 07:00) (66 - 82)  BP: 130/60 (2023 07:00) (121/58 - 158/70)  BP(mean): 87 (2023 07:00) (83 - 103)  ABP: --  ABP(mean): --  RR: 25 (2023 07:00) (19 - 28)  SpO2: 99% (2023 07:00) (98% - 100%)    O2 Parameters below as of 2023 04:00  Patient On (Oxygen Delivery Method): ventilator    O2 Concentration (%): 40      Mode: AC/ CMV (Assist Control/ Continuous Mandatory Ventilation)  RR (machine): 10  TV (machine): 350  FiO2: 40  PEEP: 5  ITime: 1  MAP: 10  PIP: 18    ABG - ( 2023 04:41 )  pH, Arterial: 7.46  pH, Blood: x     /  pCO2: 38    /  pO2: 124   / HCO3: 27    / Base Excess: 3.1   /  SaO2: 100.0             I&O's Summary    2023 07:01  -  2023 07:00  --------------------------------------------------------  IN: 2839.1 mL / OUT: 3800 mL / NET: -960.9 mL      I&O's Detail    2023 07:01  -  2023 07:00  --------------------------------------------------------  IN:    Dexmedetomidine: 264.8 mL    Fat Emulsion (Fish Oil &amp; Plant Based) 20% Infusion: 344.3 mL    IV PiggyBack: 100 mL    IV PiggyBack: 250 mL    IV PiggyBack: 100 mL    Osmolite: 220 mL    TPN (Total Parenteral Nutrition): 1560 mL  Total IN: 2839.1 mL    OUT:    Colostomy (mL): 0 mL    Indwelling Catheter - Urethral (mL): 2925 mL    Nasogastric/Oral tube (mL): 100 mL    VAC (Vacuum Assisted Closure) System (mL): 775 mL  Total OUT: 3800 mL    Total NET: -960.9 mL          PHYSICAL EXAM:          RUBIO HUGHES  [id number]  82y Female    Indication for ICU admission: open abdomen s/p bowel resection for SBO  Admit Date:   ICU Date:    OR Date: ,     No Known Allergies    PAST MEDICAL & SURGICAL HISTORY:  Atrial fibrillation  Hypertension  CVA (cerebrovascular accident)  HLD (hyperlipidemia)  GERD (gastroesophageal reflux disease)  History of cholecystectomy  History of hysterectomy  History of bladder suspension procedure      Home Medications:      24HRS EVENT:    Ov  Lasix 60mg x1 @10pm (neg 900cc @ 2am)      DAY  -decrease TV to 350   -40cc Lasix x1-->minimal response--> 60 lasix x 1 in  -trickle feeds 20  OR tomorrow  -afternoon AB.46/30/163/23   -K and Franks repleted        REVIEW OF SYSTEMS  [ ] A ten-point review of systems was otherwise negative except as noted.  [ x] Due to altered mental status/intubation, subjective information were not able to be obtained from the patient. History was obtained, to the extent possible, from review of the chart and collateral sources of information    ********************************************************************************************************  Daily     Daily   Diet, NPO after Midnight:      NPO Start Date: 2023,   NPO Start Time: 23:59  Except Medications (23 @ 19:59)  Diet, NPO after Midnight:      NPO Start Date: 2023,   NPO Start Time: 23:59 (23 @ 10:08)  Diet, NPO with Tube Feed:   Tube Feeding Modality: Nasogastric  Osmolite 1.0 Deion  Continuous  Starting Tube Feed Rate mL per Hour: 10  Increase Tube Feed Rate by (mL): 10     Every 4 hours  Until Goal Tube Feed Rate (mL per Hour): 20  Tube Feed Duration (in Hours): 24  Tube Feed Start Time: 11:00 (23 @ 10:08)    CURRENT MEDS:  Neurologic Medications  aspirin Suppository 300 milliGRAM(s) Rectal <User Schedule>  dexMEDEtomidine Infusion 0.05 MICROgram(s)/kG/Hr IV Continuous <Continuous>  ondansetron Injectable 4 milliGRAM(s) IV Push every 6 hours PRN Nausea and/or Vomiting    Respiratory Medications    Cardiovascular Medications    Gastrointestinal Medications  fat emulsion (Fish Oil and Plant Based) 20% Infusion 1.2183 Gm/kG/Day IV Continuous <Continuous>  pantoprazole  Injectable 40 milliGRAM(s) IV Push every 24 hours  Parenteral Nutrition - Adult 1 Each TPN Continuous <Continuous>  potassium phosphate IVPB 15 milliMole(s) IV Intermittent once    Genitourinary Medications    Hematologic/Oncologic Medications  heparin   Injectable 5000 Unit(s) SubCutaneous every 8 hours    Antimicrobial/Immunologic Medications  cefiderocol IVPB 1500 milliGRAM(s) IV Intermittent every 8 hours    Endocrine/Metabolic Medications    Topical/Other Medications  benzocaine 20% Spray 1 Spray(s) Topical once  chlorhexidine 0.12% Liquid 15 milliLiter(s) Oral Mucosa every 12 hours  chlorhexidine 2% Cloths 1 Application(s) Topical <User Schedule>    ICU Vital Signs Last 24 Hrs  T(C): 37 (2023 04:00), Max: 37.6 (2023 23:05)  T(F): 98.6 (2023 04:00), Max: 99.6 (2023 23:05)  HR: 70 (2023 07:00) (66 - 82)  BP: 130/60 (2023 07:00) (121/58 - 158/70)  BP(mean): 87 (2023 07:00) (83 - 103)  ABP: --  ABP(mean): --  RR: 25 (2023 07:00) (19 - 28)  SpO2: 99% (2023 07:00) (98% - 100%)    O2 Parameters below as of 2023 04:00  Patient On (Oxygen Delivery Method): ventilator    O2 Concentration (%): 40      Mode: AC/ CMV (Assist Control/ Continuous Mandatory Ventilation)  RR (machine): 10  TV (machine): 350  FiO2: 40  PEEP: 5  ITime: 1  MAP: 10  PIP: 18    ABG - ( 2023 04:41 )  pH, Arterial: 7.46  pH, Blood: x     /  pCO2: 38    /  pO2: 124   / HCO3: 27    / Base Excess: 3.1   /  SaO2: 100.0             I&O's Summary    2023 07:01  -  2023 07:00  --------------------------------------------------------  IN: 2839.1 mL / OUT: 3800 mL / NET: -960.9 mL      I&O's Detail    2023 07:  -  2023 07:00  --------------------------------------------------------  IN:    Dexmedetomidine: 264.8 mL    Fat Emulsion (Fish Oil &amp; Plant Based) 20% Infusion: 344.3 mL    IV PiggyBack: 100 mL    IV PiggyBack: 250 mL    IV PiggyBack: 100 mL    Osmolite: 220 mL    TPN (Total Parenteral Nutrition): 1560 mL  Total IN: 2839.1 mL    OUT:    Colostomy (mL): 0 mL    Indwelling Catheter - Urethral (mL): 2925 mL    Nasogastric/Oral tube (mL): 100 mL    VAC (Vacuum Assisted Closure) System (mL): 775 mL  Total OUT: 3800 mL    Total NET: -960.9 mL          PHYSICAL EXAM:   General/Neuro:  Intubated, off precedex gtt upon exam,     Cardiovascular : S1, S2.  Regular rate and rhythm.    GI: Abdomen:  Soft, nontender, mildly distended abdomen. No rigidity, guarding, or rebound tenderness. Abthera vac in place. L sided colostomy, pink, edematous, and patent with bowel sweat noted. NGT in place    MSK/Extremities: Warm & well-perfused. Peripheral pulses intact.     Derm: Good skin turgor, no skin breakdown.      :       Donis catheter in place.         RUBIO HUGHES  [id number]  82y Female    Indication for ICU admission: open abdomen s/p bowel resection for SBO  Admit Date:   ICU Date:    OR Date: ,     No Known Allergies    PAST MEDICAL & SURGICAL HISTORY:  Atrial fibrillation  Hypertension  CVA (cerebrovascular accident)  HLD (hyperlipidemia)  GERD (gastroesophageal reflux disease)  History of cholecystectomy  History of hysterectomy  History of bladder suspension procedure      Home Medications:      24HRS EVENT:    Ov  Lasix 60mg x1 @10pm (neg 900cc @ 2am)      DAY  -decrease TV to 350   -40cc Lasix x1-->minimal response--> 60 lasix x 1 in  -trickle feeds 20  OR tomorrow  -afternoon AB.46/30/163/23   -K and Franks repleted        REVIEW OF SYSTEMS  [ ] A ten-point review of systems was otherwise negative except as noted.  [ x] Due to altered mental status/intubation, subjective information were not able to be obtained from the patient. History was obtained, to the extent possible, from review of the chart and collateral sources of information    ********************************************************************************************************  Daily     Daily   Diet, NPO after Midnight:      NPO Start Date: 2023,   NPO Start Time: 23:59  Except Medications (23 @ 19:59)  Diet, NPO after Midnight:      NPO Start Date: 2023,   NPO Start Time: 23:59 (23 @ 10:08)  Diet, NPO with Tube Feed:   Tube Feeding Modality: Nasogastric  Osmolite 1.0 Deion  Continuous  Starting Tube Feed Rate mL per Hour: 10  Increase Tube Feed Rate by (mL): 10     Every 4 hours  Until Goal Tube Feed Rate (mL per Hour): 20  Tube Feed Duration (in Hours): 24  Tube Feed Start Time: 11:00 (23 @ 10:08)    CURRENT MEDS:  Neurologic Medications  aspirin Suppository 300 milliGRAM(s) Rectal <User Schedule>  dexMEDEtomidine Infusion 0.05 MICROgram(s)/kG/Hr IV Continuous <Continuous>  ondansetron Injectable 4 milliGRAM(s) IV Push every 6 hours PRN Nausea and/or Vomiting    Respiratory Medications    Cardiovascular Medications    Gastrointestinal Medications  fat emulsion (Fish Oil and Plant Based) 20% Infusion 1.2183 Gm/kG/Day IV Continuous <Continuous>  pantoprazole  Injectable 40 milliGRAM(s) IV Push every 24 hours  Parenteral Nutrition - Adult 1 Each TPN Continuous <Continuous>  potassium phosphate IVPB 15 milliMole(s) IV Intermittent once    Genitourinary Medications    Hematologic/Oncologic Medications  heparin   Injectable 5000 Unit(s) SubCutaneous every 8 hours    Antimicrobial/Immunologic Medications  cefiderocol IVPB 1500 milliGRAM(s) IV Intermittent every 8 hours    Endocrine/Metabolic Medications    Topical/Other Medications  benzocaine 20% Spray 1 Spray(s) Topical once  chlorhexidine 0.12% Liquid 15 milliLiter(s) Oral Mucosa every 12 hours  chlorhexidine 2% Cloths 1 Application(s) Topical <User Schedule>    ICU Vital Signs Last 24 Hrs  T(C): 37 (2023 04:00), Max: 37.6 (2023 23:05)  T(F): 98.6 (2023 04:00), Max: 99.6 (2023 23:05)  HR: 70 (2023 07:00) (66 - 82)  BP: 130/60 (2023 07:00) (121/58 - 158/70)  BP(mean): 87 (2023 07:00) (83 - 103)  ABP: --  ABP(mean): --  RR: 25 (2023 07:00) (19 - 28)  SpO2: 99% (2023 07:00) (98% - 100%)    O2 Parameters below as of 2023 04:00  Patient On (Oxygen Delivery Method): ventilator    O2 Concentration (%): 40      Mode: AC/ CMV (Assist Control/ Continuous Mandatory Ventilation)  RR (machine): 10  TV (machine): 350  FiO2: 40  PEEP: 5  ITime: 1  MAP: 10  PIP: 18    ABG - ( 2023 04:41 )  pH, Arterial: 7.46  pH, Blood: x     /  pCO2: 38    /  pO2: 124   / HCO3: 27    / Base Excess: 3.1   /  SaO2: 100.0             I&O's Summary    2023 07:01  -  2023 07:00  --------------------------------------------------------  IN: 2839.1 mL / OUT: 3800 mL / NET: -960.9 mL      I&O's Detail    2023 07:01  -  2023 07:00  --------------------------------------------------------  IN:    Dexmedetomidine: 264.8 mL    Fat Emulsion (Fish Oil &amp; Plant Based) 20% Infusion: 344.3 mL    IV PiggyBack: 100 mL    IV PiggyBack: 250 mL    IV PiggyBack: 100 mL    Osmolite: 220 mL    TPN (Total Parenteral Nutrition): 1560 mL  Total IN: 2839.1 mL    OUT:    Colostomy (mL): 0 mL    Indwelling Catheter - Urethral (mL): 2925 mL    Nasogastric/Oral tube (mL): 100 mL    VAC (Vacuum Assisted Closure) System (mL): 775 mL  Total OUT: 3800 mL    Total NET: -960.9 mL          PHYSICAL EXAM:   General/Neuro:  Intubated, off sedation upon exam, pupils briskly reactive to light, blinks eyes to threat but unable to follow commands, unresponsive to repeated noxious stimuli, b/l upper and lower extremity falls on motor drift, severe sensory loss noted in b/l upper and lower extremities  --> stroke code initiated, refer to stroke code event note    Cardiovascular : S1, S2.  Regular rate and rhythm.      GI: Abdomen:  Soft, nontender, mildly distended abdomen. No rigidity, guarding, or rebound tenderness. Abthera vac in place. L sided colostomy, pink, edematous, and patent with bowel sweat noted.     MSK/Extremities: Warm & well-perfused. Peripheral pulses intact.     Derm: Good skin turgor, no skin breakdown.      : Donis catheter in place.

## 2023-02-23 NOTE — CHART NOTE - NSCHARTNOTEFT_GEN_A_CORE
Neurovascular:  Discussed with Dr. Chase Oleary  CTH in 12 hours from initial CTH.   If no hemorrhage can start anticoagulation for suspected embolic stroke.  Heparin drip NO BOLUS, goal PTT 50-60.    Dr. Esquivel discussed with Dr. Leo.

## 2023-02-23 NOTE — PROGRESS NOTE ADULT - ATTENDING COMMENTS
the patient had an acute change in mental status today with her being not responsive, stroke code was called with the stroke team seeing and evaluating the patient. the patient remains on the ventilator, mild settings. vitals stable. making good urine output. vac in place. I had an extensive discussion with the patient family, ICU team and the stroke team. will hold off on going to the OR until this more urgent issue is resolved. the stroke team want more testing to clarify the pathology.

## 2023-02-23 NOTE — CONSULT NOTE ADULT - SUBJECTIVE AND OBJECTIVE BOX
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 She has had nausea and emesis which she described as bilious associated with the pain. She has had no bowel movements for the past three days but states that she has had flatus as soon as today.  She reports having diarrhea five days ago. Patient denied hematochezia, hematemesis, chest pain, cough change from chronic baseline, fever, sick contacts, nor urinary symptoms. S/P Sigmoid colectomy with ostomy, sigmoid mass resection for SBO/LBO 7 days ago and now on sedation vacation patient not responding. Last known well unclear. Stroke code called.     PAST MEDICAL & SURGICAL HISTORY:  Atrial fibrillation      Hypertension      CVA (cerebrovascular accident)      HLD (hyperlipidemia)      GERD (gastroesophageal reflux disease)      History of cholecystectomy      History of hysterectomy      History of bladder suspension procedure          FAMILY HISTORY:      Social History: (-) x 3    Allergies    No Known Allergies    Intolerances        MEDICATIONS  (STANDING):  aspirin Suppository 300 milliGRAM(s) Rectal <User Schedule>  benzocaine 20% Spray 1 Spray(s) Topical once  cefiderocol IVPB 1500 milliGRAM(s) IV Intermittent every 8 hours  chlorhexidine 0.12% Liquid 15 milliLiter(s) Oral Mucosa every 12 hours  chlorhexidine 2% Cloths 1 Application(s) Topical <User Schedule>  dexMEDEtomidine Infusion 0.05 MICROgram(s)/kG/Hr (1.03 mL/Hr) IV Continuous <Continuous>  fat emulsion (Fish Oil and Plant Based) 20% Infusion 1.2183 Gm/kG/Day (31.3 mL/Hr) IV Continuous <Continuous>  heparin   Injectable 5000 Unit(s) SubCutaneous every 8 hours  pantoprazole  Injectable 40 milliGRAM(s) IV Push every 24 hours  Parenteral Nutrition - Adult 1 Each (65 mL/Hr) TPN Continuous <Continuous>  Parenteral Nutrition - Adult 1 Each (65 mL/Hr) TPN Continuous <Continuous>  potassium phosphate IVPB 15 milliMole(s) IV Intermittent once    MEDICATIONS  (PRN):  ondansetron Injectable 4 milliGRAM(s) IV Push every 6 hours PRN Nausea and/or Vomiting    Vital Signs Last 24 Hrs  T(C): 36.3 (23 Feb 2023 08:05), Max: 37.6 (22 Feb 2023 23:05)  T(F): 97.3 (23 Feb 2023 08:05), Max: 99.6 (22 Feb 2023 23:05)  HR: 67 (23 Feb 2023 08:00) (66 - 82)  BP: 129/62 (23 Feb 2023 08:00) (121/58 - 157/72)  BP(mean): 89 (23 Feb 2023 08:00) (83 - 103)  RR: 22 (23 Feb 2023 08:00) (22 - 28)  SpO2: 99% (23 Feb 2023 08:00) (98% - 99%)    Parameters below as of 23 Feb 2023 08:00  Patient On (Oxygen Delivery Method): ventilator        Examination:  Patient mechanically vented off precedex.   Patient awake not tracking not following commands  minimal movement to noxious stimuli peripherally of bilateral UE   Reflexive movement of bilatera LE  No gaze questionable reaction to visual threat  + gag and corneal relfex  Sesation, aphasia cerebellar exam limited by intubation and mental status    NIHSS 25    Labs:   CBC Full  -  ( 23 Feb 2023 06:30 )  WBC Count : 15.52 K/uL  RBC Count : 2.87 M/uL  Hemoglobin : 7.5 g/dL  Hematocrit : 24.7 %  Platelet Count - Automated : 442 K/uL  Mean Cell Volume : 86.1 fL  Mean Cell Hemoglobin : 26.1 pg  Mean Cell Hemoglobin Concentration : 30.4 g/dL  Auto Neutrophil # : x  Auto Lymphocyte # : x  Auto Monocyte # : x  Auto Eosinophil # : x  Auto Basophil # : x  Auto Neutrophil % : x  Auto Lymphocyte % : x  Auto Monocyte % : x  Auto Eosinophil % : x  Auto Basophil % : x    02-23    142  |  107  |  31<H>  ----------------------------<  119<H>  4.0   |  26  |  0.6<L>    Ca    6.9<L>      23 Feb 2023 06:30  Phos  3.0     02-23  Mg     2.0     02-23    TPro  3.9<L>  /  Alb  1.6<L>  /  TBili  0.6  /  DBili  0.4<H>  /  AST  63<H>  /  ALT  28  /  AlkPhos  134<H>  02-21    LIVER FUNCTIONS - ( 21 Feb 2023 12:44 )  Alb: 1.6 g/dL / Pro: 3.9 g/dL / ALK PHOS: 134 U/L / ALT: 28 U/L / AST: 63 U/L / GGT: x           PT/INR - ( 23 Feb 2023 06:30 )   PT: 10.50 sec;   INR: 0.92 ratio         PTT - ( 23 Feb 2023 06:30 )  PTT:21.4 sec        Neuroimaging:  NCHCT:   < from: CT Brain Stroke Protocol (02.23.23 @ 09:25) >  IMPRESSION:    Patchy hypodensities in bilateral thalami and bilateral occipitotemporal   regions like representing acute infarct/ischemic injury. No intracranial   hemorrhage or midline shift.    Communication: The summary of above findings were discussed with readback   confirmation with TARA Zhang by radiologist Dr. Steen on 2/23/2023 at 9:36   AM.      < end of copied text >  < from: CT Angio Neck w/ IV Cont (02.23.23 @ 10:03) >  IMPRESSION:    CT PERFUSION:  No perfusion deficits to suggest areas of completed infarction or at risk   territory.    CTA HEAD/NECK:  No large vessel occlusion, aneurysm, or vascular malformation.    Focal mild stenosis in the ostium of the right vertebral artery due to   underlying calcific plaque.    Focal severe narrowing in the V2 segment of the left vertebral artery due   to left C5-6 facetarthropathy.    The dural sinuses and deep venous structures appear patent allowing for   arterial phase contrast.      < end of copied text >    02-23-23 @ 11:30

## 2023-02-23 NOTE — PROGRESS NOTE ADULT - SUBJECTIVE AND OBJECTIVE BOX
GENERAL SURGERY PROGRESS NOTE    Patient: RUBIO HUGHES , 82y ([birth year])Female   MRN: [id number]  Location: [address]  Visit: 02-10-23 Inpatient  Date: 02-23-23 @ 01:24    Events of past 24 hours:  DERRICK Morocho w/ lasix    PAST MEDICAL & SURGICAL HISTORY:  Atrial fibrillation    Hypertension    CVA (cerebrovascular accident)    HLD (hyperlipidemia)    GERD (gastroesophageal reflux disease)    History of cholecystectomy    History of hysterectomy    History of bladder suspension procedure      Vitals:   T(F): 99.6 (02-22-23 @ 23:05), Max: 99.6 (02-22-23 @ 23:05)  HR: 77 (02-23-23 @ 00:00)  BP: 141/64 (02-23-23 @ 00:00)  RR: 26 (02-23-23 @ 00:00)  SpO2: 98% (02-23-23 @ 00:00)  Mode: AC/ CMV (Assist Control/ Continuous Mandatory Ventilation), RR (machine): 10, TV (machine): 350, FiO2: 40, PEEP: 5, ITime: 1, MAP: 9, PIP: 15    Diet, NPO after Midnight:      NPO Start Date: 22-Feb-2023,   NPO Start Time: 23:59  Except Medications  Diet, NPO after Midnight:      NPO Start Date: 22-Feb-2023,   NPO Start Time: 23:59  Diet, NPO with Tube Feed:   Tube Feeding Modality: Nasogastric  Osmolite 1.0 Deion  Continuous  Starting Tube Feed Rate mL per Hour: 10  Increase Tube Feed Rate by (mL): 10     Every 4 hours  Until Goal Tube Feed Rate (mL per Hour): 20  Tube Feed Duration (in Hours): 24  Tube Feed Start Time: 11:00      Fluids:     I & O's:    02-21-23 @ 07:01  -  02-22-23 @ 07:00  --------------------------------------------------------  IN:    Dexmedetomidine: 266.5 mL    Fat Emulsion (Fish Oil &amp; Plant Based) 20% Infusion: 344.3 mL    PRBCs (Packed Red Blood Cells): 300 mL    TPN (Total Parenteral Nutrition): 1560 mL  Total IN: 2470.8 mL    OUT:    Colostomy (mL): 220 mL    Indwelling Catheter - Urethral (mL): 2105 mL    Nasogastric/Oral tube (mL): 350 mL    VAC (Vacuum Assisted Closure) System (mL): 300 mL  Total OUT: 2975 mL    Total NET: -504.2 mL    PHYSICAL EXAM:  General: Sedated. Intubated.  Cardiac: RRR.  Respiratory: On mech vent on ETT. Bilateral chest rise. NGT in place. Abthera in place.  Abdomen: Soft, non-distended, non-tender, no rebound, no guarding.   Skin: Warm/dry, normal color, no jaundice.      MEDICATIONS  (STANDING):  aspirin Suppository 300 milliGRAM(s) Rectal <User Schedule>  benzocaine 20% Spray 1 Spray(s) Topical once  cefiderocol IVPB 1500 milliGRAM(s) IV Intermittent every 8 hours  chlorhexidine 0.12% Liquid 15 milliLiter(s) Oral Mucosa every 12 hours  chlorhexidine 2% Cloths 1 Application(s) Topical <User Schedule>  dexMEDEtomidine Infusion 0.05 MICROgram(s)/kG/Hr (1.03 mL/Hr) IV Continuous <Continuous>  fat emulsion (Fish Oil and Plant Based) 20% Infusion 1.2183 Gm/kG/Day (31.3 mL/Hr) IV Continuous <Continuous>  heparin   Injectable 5000 Unit(s) SubCutaneous every 8 hours  pantoprazole  Injectable 40 milliGRAM(s) IV Push every 24 hours  Parenteral Nutrition - Adult 1 Each (65 mL/Hr) TPN Continuous <Continuous>  Parenteral Nutrition - Adult 1 Each (65 mL/Hr) TPN Continuous <Continuous>  potassium phosphate IVPB 15 milliMole(s) IV Intermittent once    MEDICATIONS  (PRN):  ondansetron Injectable 4 milliGRAM(s) IV Push every 6 hours PRN Nausea and/or Vomiting      DVT PROPHYLAXIS: heparin   Injectable 5000 Unit(s) SubCutaneous every 8 hours    GI PROPHYLAXIS: pantoprazole  Injectable 40 milliGRAM(s) IV Push every 24 hours    ANTICOAGULATION:   ANTIBIOTICS:  cefiderocol IVPB 1500 milliGRAM(s)      LAB/STUDIES:  Labs:  CAPILLARY BLOOD GLUCOSE      POCT Blood Glucose.: 146 mg/dL (23 Feb 2023 00:04)  POCT Blood Glucose.: 128 mg/dL (22 Feb 2023 17:51)  POCT Blood Glucose.: 132 mg/dL (22 Feb 2023 12:21)  POCT Blood Glucose.: 122 mg/dL (22 Feb 2023 07:02)                          7.8    14.29 )-----------( 380      ( 22 Feb 2023 05:15 )             25.3       Auto Neutrophil %: 80.4 % (02-22-23 @ 05:15)  Auto Immature Granulocyte %: 4.8 % (02-22-23 @ 05:15)    02-22    143  |  109  |  31<H>  ----------------------------<  121<H>  3.7   |  27  |  0.6<L>      Calcium, Total Serum: 7.2 mg/dL (02-22-23 @ 17:00)      LFTs:             3.9  | 0.6  | 63       ------------------[134     ( 21 Feb 2023 12:44 )  1.6  | 0.4  | 28          Lipase:x      Amylase:x         Blood Gas Arterial, Lactate: 1.20 mmol/L (02-22-23 @ 16:07)  Blood Gas Arterial, Lactate: 1.50 mmol/L (02-22-23 @ 03:37)  Blood Gas Arterial, Lactate: 0.90 mmol/L (02-21-23 @ 22:53)  Blood Gas Arterial, Lactate: 1.60 mmol/L (02-21-23 @ 03:32)  Blood Gas Arterial, Lactate: 1.50 mmol/L (02-20-23 @ 03:41)    ABG - ( 22 Feb 2023 16:07 )  pH: 7.47  /  pCO2: 36    /  pO2: 156   / HCO3: 26    / Base Excess: 2.7   /  SaO2: 100.0       ABG - ( 22 Feb 2023 03:37 )  pH: 7.49  /  pCO2: 30    /  pO2: 163   / HCO3: 23    / Base Excess: 0.4   /  SaO2: 100.0       ABG - ( 21 Feb 2023 22:53 )  pH: 7.37  /  pCO2: 41    /  pO2: 85    / HCO3: 24    / Base Excess: -1.5  /  SaO2: 99.0        Coags:     11.00  ----< 0.97    ( 21 Feb 2023 04:12 )     25.9        Serum Pro-Brain Natriuretic Peptide: 822 pg/mL (02-14-23 @ 06:30)      IMAGING:  < from: Xray Chest 1 View- PORTABLE-Routine (02.22.23 @ 06:50) >  Impression:    Pulmonary vascular congestion, unchanged.    < end of copied text >

## 2023-02-23 NOTE — CONSULT NOTE ADULT - ASSESSMENT
Impression:  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,4,5 digit numbness) on aspirin and no other antiplatelet/anticoagulant agents,  Afib, GERD, HTN, HLD, chronic UTIs, pasty cholecystectomy(>20yrs ago), hysterectomy (>45yrs ago), and bladder lift (>30yrs ago) presented to the ED with a 3 day history of intermittent sharp, non-radiating epigastric pain. S/p Sigmoid colectomy with ostomy, sigmoid mass resection for SBO/LBO 7 days ago and now on sedation vacation patient not responding. Last known well unclear. Abdomen remains open. Stroke code called. Out of the window for IV thrombolytics. Patient with no LVO therefore not a candidate for IA intervention. CTH revealed Patchy hypodensities in bilateral thalami and bilateral occipitotemporal regions like representing acute infarct/ischemic injury. Etiology unclear at this time, differentials include embolic or flow related ischemic strokes, infectious or metastatic etiology as well.     Suggestion:  MRI brain with and without lynda when stable and if MRI compatible.  VEEG  Seizure precautions  Keep magnesium >2  When cleared by SICU suggest aspirin 81 mg daily, suggest statin when medically able.   Telemetry monitoring.   Q1 neuro checks.   Call with questions or change in neuro status. [phone]    Impression:  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,4,5 digit numbness) on aspirin and no other antiplatelet/anticoagulant agents,  Afib, GERD, HTN, HLD, chronic UTIs, pasty cholecystectomy(>20yrs ago), hysterectomy (>45yrs ago), and bladder lift (>30yrs ago) presented to the ED with a 3 day history of intermittent sharp, non-radiating epigastric pain. S/p Sigmoid colectomy with ostomy, sigmoid mass resection for SBO/LBO 7 days ago and now on sedation vacation patient not responding. Last known well unclear. Abdomen remains open. Stroke code called. Out of the window for IV thrombolytics. Patient with no LVO therefore not a candidate for IA intervention. CTH revealed Patchy hypodensities in bilateral thalami and bilateral occipitotemporal regions like representing acute infarct/ischemic injury. Etiology unclear at this time, differentials include embolic or flow related ischemic strokes, infectious or metastatic etiology as well.     Suggestion:  MRI brain with and without lynda when stable and if no contraindications.  Can also repeat CTH in 12 hours  VEEG  Cardiology evaluation and TTE with contrast  Seizure precautions  Keep magnesium >2  Continue aspirin 300mg Suppository  Telemetry monitoring.   Q1 neuro checks.   Call with questions or change in neuro status. [phone]

## 2023-02-23 NOTE — CONSULT NOTE ADULT - NS ATTEND AMEND GEN_ALL_CORE FT
Patient seen and examined and agree with above except as noted.  Patients history, notes ,labs, imaging, vitals and meds reviewed personally.  Reviewed flowsheets since surgery and no significant hypotension seen  Discussed with primary team and no signs of infection or sepsis  NO history of afib and was on aspirin for prior stroke and has been maintained on aspirin 300mg suppository during admission  Exam limited secondary to sedation and pupils reactive to light equally, grimaces to noxious stimuli in UE>LE however no movement to noxious stimuli  Discussed with primary team about possible need for anticoagulation and at this point no indication for anticoagulation.  I would suggest assessing the heart for any signs of thrombus with TTE with contrast and bubble.  Discussed with family at bedside    Plan as above

## 2023-02-24 NOTE — PROGRESS NOTE ADULT - SUBJECTIVE AND OBJECTIVE BOX
RUBIO HUGHES  [id number]  82y Female    Indication for ICU admission: open abdomen s/p bowel resection for SBO  Admit Date: 2/9  ICU Date:  2/14  OR Date: 2/14, 2/16    No Known Allergies    PAST MEDICAL & SURGICAL HISTORY:  Atrial fibrillation  Hypertension  CVA (cerebrovascular accident)  HLD (hyperlipidemia)  GERD (gastroesophageal reflux disease)  History of cholecystectomy  History of hysterectomy  History of bladder suspension procedure      Home Medications:      24HRS EVENT:  2/23  Night   Desat to 88 while in CT > improved   hypertensive > labetalol 5 x 1  ABG  Holding precedex   Not following commands  Restart tube feeds   CTH completed, awaiting read  restarted DVT ppx     2/23  Day  -CODE STROKE NIH 23  -CTH:Patchy hypodensities in bilateral thalami and bilateral occipitotemporal regions like representing acute infarct/ischemic injury. No intracranial hemorrhage or midline shift.  -CTA:no LVO/AVM,left verterbral artery stenosis  -f/u neuro & NI: CTH in 12 hours from initial CTH. Holding anticoagulation   -resume trickle feeds/wean TPN  - f/u TTE  - Repeat CT Head @ 9PM  - C/s Cardiology Recs: TTE, complete full stroke workup, reconsult if needed   - OR cancelled per    - HOLDING ANTICOAGULATION- no evidence of A-fib in medical records  - f/u vEEG   - RR only 10, ABG ordered: 7.51/34/218/27 Lac 1.5       REVIEW OF SYSTEMS  [ ] A ten-point review of systems was otherwise negative except as noted.  [ x] Due to altered mental status/intubation, subjective information were not able to be obtained from the patient. History was obtained, to the extent possible, from review of the chart and collateral sources of information  ********************************************************************************************************   RUBIO HUGHES  [id number]  82y Female    Indication for ICU admission: open abdomen s/p bowel resection for SBO  Admit Date: 2/9  ICU Date:  2/14  OR Date: 2/14, 2/16    No Known Allergies    PAST MEDICAL & SURGICAL HISTORY:  Atrial fibrillation  Hypertension  CVA (cerebrovascular accident)  HLD (hyperlipidemia)  GERD (gastroesophageal reflux disease)  History of cholecystectomy  History of hysterectomy  History of bladder suspension procedure      Home Medications:      24HRS EVENT:  2/23  Night   Desat to 88 while in CT > improved   hypertensive > labetalol 5 x 1  ABG  Holding precedex   Not following commands  Restart tube feeds   CTH completed, awaiting read  restarted DVT ppx     2/23  Day  -CODE STROKE NIH 23  -CTH:Patchy hypodensities in bilateral thalami and bilateral occipitotemporal regions like representing acute infarct/ischemic injury. No intracranial hemorrhage or midline shift.  -CTA:no LVO/AVM,left verterbral artery stenosis  -f/u neuro & NI: CTH in 12 hours from initial CTH. Holding anticoagulation   -resume trickle feeds/wean TPN  - f/u TTE  - Repeat CT Head @ 9PM  - C/s Cardiology Recs: TTE, complete full stroke workup, reconsult if needed   - OR cancelled per    - HOLDING ANTICOAGULATION- no evidence of A-fib in medical records  - f/u vEEG   - RR only 10, ABG ordered: 7.51/34/218/27 Lac 1.5       REVIEW OF SYSTEMS  [ ] A ten-point review of systems was otherwise negative except as noted.  [ x] Due to altered mental status/intubation, subjective information were not able to be obtained from the patient. History was obtained, to the extent possible, from review of the chart and collateral sources of information  ********************************************************************************************************  Daily     Daily   Diet, NPO with Tube Feed:   Tube Feeding Modality: Nasogastric  Peptamen Intense VHP  Total Volume for 24 Hours (mL): 960  Continuous  Starting Tube Feed Rate mL per Hour: 30  Increase Tube Feed Rate by (mL): 10     Every 4 hours  Until Goal Tube Feed Rate (mL per Hour): 40  Tube Feed Duration (in Hours): 24  Tube Feed Start Time: 12:53 (02-24-23 @ 12:53)    CURRENT MEDS:  Neurologic Medications  dexMEDEtomidine Infusion 0.05 MICROgram(s)/kG/Hr IV Continuous <Continuous>  ondansetron Injectable 4 milliGRAM(s) IV Push every 6 hours PRN Nausea and/or Vomiting    Respiratory Medications    Cardiovascular Medications    Gastrointestinal Medications  pantoprazole  Injectable 40 milliGRAM(s) IV Push every 24 hours  Parenteral Nutrition - Adult 1 Each TPN Continuous <Continuous>  potassium phosphate IVPB 15 milliMole(s) IV Intermittent once    Genitourinary Medications    Hematologic/Oncologic Medications  aspirin  chewable 81 milliGRAM(s) Oral daily  heparin   Injectable 5000 Unit(s) SubCutaneous every 8 hours    Antimicrobial/Immunologic Medications  cefiderocol IVPB 1500 milliGRAM(s) IV Intermittent every 8 hours    Endocrine/Metabolic Medications    Topical/Other Medications  benzocaine 20% Spray 1 Spray(s) Topical once  chlorhexidine 0.12% Liquid 15 milliLiter(s) Oral Mucosa every 12 hours  chlorhexidine 2% Cloths 1 Application(s) Topical <User Schedule>    ICU Vital Signs Last 24 Hrs  T(C): 36.9 (24 Feb 2023 12:00), Max: 37.1 (24 Feb 2023 08:00)  T(F): 98.4 (24 Feb 2023 12:00), Max: 98.7 (24 Feb 2023 08:00)  HR: 75 (24 Feb 2023 12:00) (63 - 102)  BP: 151/67 (24 Feb 2023 12:00) (117/55 - 178/99)  BP(mean): 97 (24 Feb 2023 12:00) (79 - 132)  ABP: --  ABP(mean): --  RR: 22 (24 Feb 2023 12:00) (16 - 30)  SpO2: 99% (24 Feb 2023 12:00) (93% - 100%)    O2 Parameters below as of 24 Feb 2023 12:00  Patient On (Oxygen Delivery Method): ventilator    O2 Concentration (%): 40      Mode: AC/ CMV (Assist Control/ Continuous Mandatory Ventilation)  RR (machine): 10  TV (machine): 350  FiO2: 50  PEEP: 5  ITime: 1  MAP: 11  PIP: 23    ABG - ( 24 Feb 2023 04:39 )  pH, Arterial: 7.48  pH, Blood: x     /  pCO2: 39    /  pO2: 67    / HCO3: 29    / Base Excess: 5.1   /  SaO2: 95.8              I&O's Summary    23 Feb 2023 07:01  -  24 Feb 2023 07:00  --------------------------------------------------------  IN: 2423.5 mL / OUT: 3430 mL / NET: -1006.5 mL    24 Feb 2023 07:01  -  24 Feb 2023 13:48  --------------------------------------------------------  IN: 445.5 mL / OUT: 450 mL / NET: -4.5 mL      I&O's Detail    23 Feb 2023 07:01  -  24 Feb 2023 07:00  --------------------------------------------------------  IN:    Dexmedetomidine: 64.2 mL    Fat Emulsion (Fish Oil &amp; Plant Based) 20% Infusion: 344.3 mL    IV PiggyBack: 100 mL    Osmolite: 420 mL    TPN (Total Parenteral Nutrition): 1495 mL  Total IN: 2423.5 mL    OUT:    Colostomy (mL): 400 mL    Indwelling Catheter - Urethral (mL): 2430 mL    Nasogastric/Oral tube (mL): 200 mL    VAC (Vacuum Assisted Closure) System (mL): 400 mL  Total OUT: 3430 mL    Total NET: -1006.5 mL      24 Feb 2023 07:01  -  24 Feb 2023 13:48  --------------------------------------------------------  IN:    Dexmedetomidine: 20.5 mL    Osmolite: 100 mL    TPN (Total Parenteral Nutrition): 325 mL  Total IN: 445.5 mL    OUT:    Colostomy (mL): 100 mL    Indwelling Catheter - Urethral (mL): 350 mL    VAC (Vacuum Assisted Closure) System (mL): 0 mL  Total OUT: 450 mL    Total NET: -4.5 mL          PHYSICAL EXAM:   General/Neuro:  Intubated, sedated, pupils reactive to light, blinks eyes to threat, opens eyes to voice/ noxious stimuli, not following commands such as squeezing fingers/ moving toes   Cardiovascular : S1, S2.  Regular rate and rhythm.    GI: Abdomen:  Soft, nontender, mildly distended abdomen. No rigidity, guarding, or rebound tenderness. Abthera vac in place. L sided colostomy, pink, edematous, and patent with stool noted.   MSK/ Extremities: Warm & well-perfused. Peripheral pulses intact.   Derm: Good skin turgor, no skin breakdown.    : Donis catheter in place.

## 2023-02-24 NOTE — PROGRESS NOTE ADULT - ASSESSMENT
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 S/p Sigmoid colectomy with ostomy, sigmoid mass resection for SBO/LBO 7 days ago and now on sedation vacation patient not responding. Last known well unclear. Abdomen remains open. Stroke code called. Out of the window for IV thrombolytics. Patient with no LVO therefore not a candidate for IA intervention. CTH revealed Patchy hypodensities in bilateral thalami and bilateral occipitotemporal regions like representing acute infarct/ischemic injury. Etiology unclear at this time, differentials include embolic or flow related ischemic strokes, less likely infectious or metastatic etiology.     Suggestion:  MRI brain with and without lynda when stable and if no contraindications.  Follow up repeat CTH results  Follow up VEEG results  Seizure precautions  Keep magnesium >2  Continue aspirin 300mg Suppository  Telemetry monitoring.   Q1 neuro checks.   Call with questions or change in neuro status. [phone]

## 2023-02-24 NOTE — CHART NOTE - NSCHARTNOTEFT_GEN_A_CORE
NUTRITION SUPPORT TEAM  -  PROGRESS NOTE     Interval Events:    s/p stroke code, CTH showing acute infarct/ischemic injury  Unresponsive on vent  Started on NG feeds and tolerated well  Colostomy functioning  TPN infusing  d/w sicu team    REVIEW OF SYSTEMS:  Negative except as noted above.     VITALS:  T(F): 98.7 (02-24 @ 08:00), Max: 98.7 (02-24 @ 08:00)  HR: 75 (02-24 @ 12:00) (72 - 102)  BP: 143/63 (02-24 @ 11:00) (130/61 - 163/68)  RR: 22 (02-24 @ 12:00) (22 - 30)  SpO2: 99% (02-24 @ 12:00) (93% - 99%)    HEIGHT/WEIGHT/BMI:   Height (cm): 152.4 (02-21)  Weight (kg): 82.1 (02-21)  BMI (kg/m2): 35.3 (02-21)      I/Os:     02-23-23 @ 07:01  -  02-24-23 @ 07:00  --------------------------------------------------------  IN:    Dexmedetomidine: 64.2 mL    Fat Emulsion (Fish Oil &amp; Plant Based) 20% Infusion: 344.3 mL    IV PiggyBack: 100 mL    Osmolite: 420 mL    TPN (Total Parenteral Nutrition): 1495 mL  Total IN: 2423.5 mL    OUT:    Colostomy (mL): 400 mL    Indwelling Catheter - Urethral (mL): 2430 mL    Nasogastric/Oral tube (mL): 200 mL    VAC (Vacuum Assisted Closure) System (mL): 400 mL  Total OUT: 3430 mL    Total NET: -1006.5 mL    MEDICATIONS:   aspirin  chewable 81 milliGRAM(s) Oral daily  benzocaine 20% Spray 1 Spray(s) Topical once  cefiderocol IVPB 1500 milliGRAM(s) IV Intermittent every 8 hours  chlorhexidine 0.12% Liquid 15 milliLiter(s) Oral Mucosa every 12 hours  chlorhexidine 2% Cloths 1 Application(s) Topical <User Schedule>  dexMEDEtomidine Infusion 0.05 MICROgram(s)/kG/Hr IV Continuous <Continuous>  heparin   Injectable 5000 Unit(s) SubCutaneous every 8 hours  ondansetron Injectable 4 milliGRAM(s) IV Push every 6 hours PRN  pantoprazole  Injectable 40 milliGRAM(s) IV Push every 24 hours  Parenteral Nutrition - Adult 1 Each TPN Continuous <Continuous>  potassium phosphate IVPB 15 milliMole(s) IV Intermittent once      LABS:                         7.8    26.70 )-----------( 482      ( 24 Feb 2023 05:50 )             25.1     144  |  105  |  28<H>  ----------------------------<  150<H>          (02-24-23 @ 05:50)  3.7   |  32  |  0.6<L>    Ca    7.4<L>          (02-24-23 @ 05:50)  Phos  2.9         (02-24-23 @ 05:50)  Mg     2.0         (02-24-23 @ 05:50)    Triglycerides, Serum: 196 mg/dL (02-17 @ 09:21)    Blood Glucose (Past 24 hours):  147 mg/dL (02-23 @ 17:40)      DIET:   Diet, NPO with Tube Feed:   Tube Feeding Modality: Nasogastric  Osmolite 1.0 Deion  Total Volume for 24 Hours (mL): 1320  Continuous  Starting Tube Feed Rate {mL per Hour}: 20  Increase Tube Feed Rate by (mL): 10     Every 4 hours  Until Goal Tube Feed Rate (mL per Hour): 55  Tube Feed Duration (in Hours): 24  Tube Feed Start Time: 12:45 (02-24-23 @ 12:37) [Active]    Parenteral Nutrition - Adult 1 Each (65 mL/Hr) TPN Continuous <Continuous>, 02-23-23 @ 20:00, 20:00, Stop order after: 1 Days      ASSESSMENT  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,4,5 digit numbness) on aspirin and no other antiplatelet/anticoagulant agents,  GERD, HTN, HLD, chronic UTIs, pasty cholecystectomy(>20yrs ago), hysterectomy (>45yrs ago), and bladder lift (>30yrs ago) presented to the ED with SBO s/p diagnostic lap converted to exploratory laparotomy, sigmoidectomy, cecal enterotomy w/ stool expression and closure, descending colostomy, and abthera placement    - NPO since admission  - obesity, BMI 35.3  - at high risk for malnutrition and surgical complications in absence of adequate nutrition support  - s/p code stroke with CTH showing acute infarct/ischemic injury    PLAN  - suggest changing TF's to Peptamen Intense VHP at 40ml/hr for now  - taper and d/c TPN today (d/w SICU team)  - glycemic control   - will follow

## 2023-02-24 NOTE — PROCEDURE NOTE - NSPROCDETAILS_GEN_ALL_CORE
location identified, draped/prepped, sterile technique used, needle inserted/introduced/positive blood return obtained via catheter/connected to a pressurized flush line/sutured in place/all materials/supplies accounted for at end of procedure
location identified, draped/prepped, sterile technique used
guidewire recovered/lumen(s) aspirated and flushed/sterile dressing applied/sterile technique, catheter placed/ultrasound guidance with use of sterile gel and probe cove

## 2023-02-24 NOTE — PROGRESS NOTE ADULT - ASSESSMENT
Assessment and Plan:   82y Female  2/21: abd washout, abthera placed   2/19: abdominal washout, no further resection   2/16: Previous cecetomy site noted to be ischemic, omental patch repair, rest of bowel no ischemic changes, bowel edematous, abthera vac in place, abdomen open  2/14: s/p Sigmoid colectomy with ostomy, sigmoid mass resection for SBO/LBO.     NEURO:  #Acute pain    -APAP IV prn    -Fentanyl 25 mcg prn q4  #Acute sedation    -Precedex infusion > held d/t stroke work up   #Code stroke 2/23   -CTH revealed patchy hypodensities in bilateral thalami and bilateral occipitotemporal regions like representing acute infarct/ischemic injury. No intracranial hemorrhage or midline shift.   -CTA: no LVO/AVM, left vertebral artery stenosis.  -Neurology recs: repeat CTH pending, VEEG, hold anticoagulation at this time   - 2nd CTH completed > awaiting read   - Not following commands     RESP:   #Oxygenation  RR (machine): 10, TV (machine): 350, FiO2: 40, PEEP: 5  ABG:   -ET  22cm  -Daily CXR     CARDS:   #hypotension 2/2 hypovolemic shock-resolved  -Prev on levophed, off since 10:30 AM 2/20  -#Imaging/labs  Echo 2/18:  elevated LA pressures, Lv not well visualized suspect normal lv function, suggest lumason, distal anteroseptal hypokinesis, sclerotic Aov  -trop neg x 3  - C/s Cardiology Recs 2/23: TTE, complete full stroke workup, reconsult if needed     GI/NUTR:   #s/p Sigmoid colectomy with descending ostomy and resection of sigmoid mass, 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  - pathology -  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  attempts to close lower portion resulted in elevated peak airway pressures. Application of abthera vac dressing  -RTOR 2/21 for abdominal washout and replacement of abthera   -Will not RTOR tomorrow due to patient's clinical status as per Dr. Rebollar   #Diet  -TF @20/hr   -TPN @65/hr  #GI Prophylaxis    -Pantoprazole  #Bowel regimen    -HOLDING    /RENAL:   #urine output in critically ill    -indwelling rodriguez (placed 2/14/2023)  #GERA  (baseline Cr 0.8) > resolved     -peak 1.4    Labs:          BUN/Cr- 31/0.6  -->,  31/0.6  -->          Electrolytes-Na 142 // K 4.0 // Mg 2.0 //  Phos 3.0 (02-23 @ 06:30)      HEME/ONC:   Holding anticoagulation at this time   #acute anemia in the setting of open abdomen      DVT prophylaxis-heparin   Injectable    Labs: Hb/Hct:  7.8/25.3  -->,  7.5/24.7  -->                      Plts:  380  -->,  442  -->                 PTT/INR:  21.4/0.92  --->       ID:  WBC- 20.01  --->>,  14.29  --->>,  15.52  --->>  Temp trend- 24hrs T(F): 97.8 (02-24 @ 01:00), Max: 98.6 (02-23 @ 04:00)  Antibiotics-cefiderocol IVPB 1500 every 8 hours  Cultures:         ENDO:    -FSG q6 while NPO    -Glucose goal 140-180    -if above 180 start ISS     HA1C     LINES/DRAINS:  PIV, Rodriguez, Left Radial A line, ETT, Abdominal abthera vac, NGT    ADVANCED DIRECTIVES:  Full Code    INDICATION FOR SICU: HEMODYNAMIC MONITORING POSTOPERATIVE REQUIRING VASOPRESSORS AND MECHANICAL VENTILATION     Dispo: SICU Assessment and Plan:   82y Female  2/21: abd washout, abthera placed   2/19: abdominal washout, no further resection   2/16: Previous cecetomy site noted to be ischemic, omental patch repair, rest of bowel no ischemic changes, bowel edematous, abthera vac in place, abdomen open  2/14: s/p Sigmoid colectomy with ostomy, sigmoid mass resection for SBO/LBO.     NEURO:  #Acute pain    -APAP IV prn    -Fentanyl 25 mcg prn q4  #Acute sedation    -Precedex infusion > held d/t stroke work up   #Code stroke 2/23   -CTH revealed patchy hypodensities in bilateral thalami and bilateral occipitotemporal regions like representing acute infarct/ischemic injury. No intracranial hemorrhage or midline shift.   -CTA: no LVO/AVM, left vertebral artery stenosis.  -Neurology recs: repeat CTH- stable, VEEG, hold anticoagulation at this time   - 2nd CTH completed > no significant change in edema involving b/l thalamic and occipital lobes     RESP:   #Oxygenation  RR (machine): 10, TV (machine): 350, FiO2: 40, PEEP: 5  ABG: ABG - ( 24 Feb 2023 04:39 )  pH, Arterial: 7.48  pH, Blood: x     /  pCO2: 39    /  pO2: 67    / HCO3: 29    / Base Excess: 5.1   /  SaO2: 95.8    -ET  22cm  -Daily CXR     CARDS:   #hypotension 2/2 hypovolemic shock-resolved  -Prev on levophed, off since 10:30 AM 2/20  -#Imaging/labs  Echo 2/18:  elevated LA pressures, Lv not well visualized suspect normal lv function, suggest lumason, distal anteroseptal hypokinesis, sclerotic Aov, repeat echo f/u   -trop neg x 3  - C/s Cardiology Recs 2/23: TTE, complete full stroke workup, reconsult if needed     GI/NUTR:   #s/p Sigmoid colectomy with descending ostomy and resection of sigmoid mass, 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  - pathology -  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  attempts to close lower portion resulted in elevated peak airway pressures. Application of abthera vac dressing  -RTOR 2/21 for abdominal washout and replacement of abthera   -OR cancelled for today, hold off on surgery until brain edema diminishes as per neuro rec, reevaluate early next week   #Diet  -NPO with VHP@20/hr, plan to advance to goal 40   -TPN @65/hr, plan to wean TPN off today   #GI Prophylaxis    -Pantoprazole  #Bowel regimen    -HOLDING    /RENAL:   #urine output in critically ill    -indwelling rodriguez (placed 2/14/2023)  #GERA  (baseline Cr 0.8) > resolved     -peak 1.4    Labs:          BUN/Cr- 31/0.6  -->,  28/0.6  -->          Electrolytes-Na 144 // K 3.7 // Mg 2.0 //  Phos 2.9 (02-24 @ 05:50)      HEME/ONC:   Holding anticoagulation at this time   #acute anemia in the setting of open abdomen      DVT prophylaxis-heparin   Injectable    Labs: Hb/Hct:  7.5/24.7  -->,  7.8/25.1  -->                      Plts:  442  -->,  482  -->                 PTT/INR:          ID:  WBC- 14.29  --->>,  15.52  --->>,  26.70  --->>  Temp trend- 24hrs T(F): 98.4 (02-24 @ 12:00), Max: 98.7 (02-24 @ 08:00)  Antibiotics-cefiderocol IVPB 1500 every 8 hours    ENDO:    -FSG q6 while NPO    -Glucose goal 140-180    -if above 180 start ISS     HA1C     LINES/DRAINS:  PIV, Rodriguez, Left Radial A line, ETT, Abdominal abthera vac, NGT    ADVANCED DIRECTIVES:  Full Code    INDICATION FOR SICU: HEMODYNAMIC MONITORING POSTOPERATIVE REQUIRING VASOPRESSORS AND MECHANICAL VENTILATION     Dispo: SICU

## 2023-02-24 NOTE — PROGRESS NOTE ADULT - ASSESSMENT
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 Hospital course complicated by stoke in bilateral PCA distribution    Plan  - sedation as needed  - Neurology eval and management for acute stroke   - neuro checks   - f/u repeat CT head  - no need for active anticoagulation at this time as per Neurology  - urgent Cardiology eval for possible BETO - recommended TTE with bubble study  - on ASA per rectum  - continue Vent support; follow ABG  - keep MAP>65  - on TPN; advance trickle feeds to goal  - follow serum electrolytes and UOP  - on Cefiderocol as per ID Klebsiella PNA  - ID f/u reqs  - DVT prophylaxis.

## 2023-02-24 NOTE — PROGRESS NOTE ADULT - SUBJECTIVE AND OBJECTIVE BOX
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 She has had nausea and emesis which she described as bilious associated with the pain. She has had no bowel movements for the past three days but states that she has had flatus as soon as today.  She reports having diarrhea five days ago. Patient denied hematochezia, hematemesis, chest pain, cough change from chronic baseline, fever, sick contacts, nor urinary symptoms. S/P Sigmoid colectomy with ostomy, sigmoid mass resection for SBO/LBO 8 days ago and on sedation vacation patient not responding. Last known well unclear. Stroke code called. CTH revealed bilateral posterior circulation ischemic strokes.           Vital Signs Last 24 Hrs  T(C): 37.1 (24 Feb 2023 08:00), Max: 37.1 (24 Feb 2023 08:00)  T(F): 98.7 (24 Feb 2023 08:00), Max: 98.7 (24 Feb 2023 08:00)  HR: 86 (24 Feb 2023 08:15) (58 - 102)  BP: 149/67 (24 Feb 2023 08:00) (117/55 - 178/99)  BP(mean): 97 (24 Feb 2023 08:00) (79 - 132)  RR: 28 (24 Feb 2023 08:00) (16 - 30)  SpO2: 99% (24 Feb 2023 08:15) (93% - 100%)    Parameters below as of 24 Feb 2023 08:00  Patient On (Oxygen Delivery Method): ventilator    O2 Concentration (%): 40    Neurological Exam:   Patient mechanically vented   Patient awake not tracking not following commands  Minimal movement spontaneously of bilateral UE   Reflexive movement of bilateral LE  No gaze questionable reaction to visual threat  + gag and corneal relfex  Sesation, aphasia cerebellar exam limited by intubation and mental status    NIHSS 25      Medications:  MEDICATIONS  (STANDING):  aspirin  chewable 81 milliGRAM(s) Oral daily  benzocaine 20% Spray 1 Spray(s) Topical once  cefiderocol IVPB 1500 milliGRAM(s) IV Intermittent every 8 hours  chlorhexidine 0.12% Liquid 15 milliLiter(s) Oral Mucosa every 12 hours  chlorhexidine 2% Cloths 1 Application(s) Topical <User Schedule>  dexMEDEtomidine Infusion 0.05 MICROgram(s)/kG/Hr (1.03 mL/Hr) IV Continuous <Continuous>  heparin   Injectable 5000 Unit(s) SubCutaneous every 8 hours  pantoprazole  Injectable 40 milliGRAM(s) IV Push every 24 hours  Parenteral Nutrition - Adult 1 Each (65 mL/Hr) TPN Continuous <Continuous>  potassium phosphate IVPB 15 milliMole(s) IV Intermittent once      Labs:  CBC Full  -  ( 24 Feb 2023 05:50 )  WBC Count : 26.70 K/uL  RBC Count : 2.94 M/uL  Hemoglobin : 7.8 g/dL  Hematocrit : 25.1 %  Platelet Count - Automated : 482 K/uL  Mean Cell Volume : 85.4 fL  Mean Cell Hemoglobin : 26.5 pg  Mean Cell Hemoglobin Concentration : 31.1 g/dL  Auto Neutrophil # : x  Auto Lymphocyte # : x  Auto Monocyte # : x  Auto Eosinophil # : x  Auto Basophil # : x  Auto Neutrophil % : x  Auto Lymphocyte % : x  Auto Monocyte % : x  Auto Eosinophil % : x  Auto Basophil % : x    02-24    144  |  105  |  28<H>  ----------------------------<  150<H>  3.7   |  32  |  0.6<L>    Ca    7.4<L>      24 Feb 2023 05:50  Phos  2.9     02-24  Mg     2.0     02-24

## 2023-02-24 NOTE — PROGRESS NOTE ADULT - ATTENDING COMMENTS
vitals stable. on mechanical ventilation. making good urine. wound vac in place. neurology team wants MRI for confirmation and prognostication. will hold off on going to the OR because of acute stroke.

## 2023-02-24 NOTE — PROGRESS NOTE ADULT - ATTENDING COMMENTS
Patient remains with significant neuro deficit - not moving extremities.  Opens eyes spontaneously but no eye contact.  No history of A.fib. and no need for anticoagulation at this time as per Neurology.  Getting Video EEG.  Required Labetalol iv  for high BP.  Started on trickle feeds yesterday, colostomy functioning.    ASSESSMENT:  83 y/o female with Large Bowel Obstruction.  S/P Ex. Lap and Devon's procedure.  Temporary abdominal closure with Abthera.  New B/l Thalami and Occipitotemporal Brain Acute Infarcts.  Acute respiratory failure with hypoxia.  On mechanical ventilation.  Hypervolemia.  At risk for hemodynamic instability.  Moderate protein malnutrition.    PLAN:  - sedation as needed  - continue Neuro checks  - follow repeat CT head report  - Neurology f/u appreciated  - no need for active anticoagulation at this time as per Neurology  - continue Vent support; follow ABG  - keep MAP>65  - advance trickle feeds to goal wean TPN off  - change ASA to via NGT  - follow serum electrolytes and UOP  - on Cefiderocol as per ID Klebsiella PNA  - DVT prophylaxis  OR canceled for now as high risk for CVA

## 2023-02-24 NOTE — EEG REPORT - NS EEG TEXT BOX
Epilepsy Attending Note:     RUBIO HUGHES    82y Female  MRN [id number]    Vital Signs Last 24 Hrs  T(C): 37.1 (2023 08:00), Max: 37.1 (2023 08:00)  T(F): 98.7 (2023 08:00), Max: 98.7 (2023 08:00)  HR: 89 (2023 10:00) (58 - 102)  BP: 147/65 (2023 10:00) (117/55 - 178/99)  BP(mean): 93 (2023 10:00) (79 - 132)  RR: 25 (2023 10:00) (16 - 30)  SpO2: 99% (2023 10:00) (93% - 100%)    Parameters below as of 2023 10:00  Patient On (Oxygen Delivery Method): ventilator    O2 Concentration (%): 40                          7.8    26.70 )-----------( 482      ( 2023 05:50 )             25.1       02-24    144  |  105  |  28<H>  ----------------------------<  150<H>  3.7   |  32  |  0.6<L>    Ca    7.4<L>      2023 05:50  Phos  2.9     02-24  Mg     2.0     02-24        MEDICATIONS  (STANDING):  aspirin  chewable 81 milliGRAM(s) Oral daily  benzocaine 20% Spray 1 Spray(s) Topical once  cefiderocol IVPB 1500 milliGRAM(s) IV Intermittent every 8 hours  chlorhexidine 0.12% Liquid 15 milliLiter(s) Oral Mucosa every 12 hours  chlorhexidine 2% Cloths 1 Application(s) Topical <User Schedule>  dexMEDEtomidine Infusion 0.05 MICROgram(s)/kG/Hr (1.03 mL/Hr) IV Continuous <Continuous>  heparin   Injectable 5000 Unit(s) SubCutaneous every 8 hours  pantoprazole  Injectable 40 milliGRAM(s) IV Push every 24 hours  Parenteral Nutrition - Adult 1 Each (65 mL/Hr) TPN Continuous <Continuous>  potassium phosphate IVPB 15 milliMole(s) IV Intermittent once    MEDICATIONS  (PRN):  ondansetron Injectable 4 milliGRAM(s) IV Push every 6 hours PRN Nausea and/or Vomiting            VEEG in the last 24 hours:    Background - continuous, symmetrical, reactive, less than optimally organized, reaching frequencies in the range of 6-7Hz    Focal and generalized slowin. mild to moderate generalized slowing  2. bilateral focal slowing over posterior quadrants with shifting asymmetry, R>L  3. borderline independent left frontal focal slowing    Interictal activity - small number of R>L FT sharp transients    Events - none    Seizures - none    Impression: VEEG as above    Plan - per neurovascular team

## 2023-02-24 NOTE — PROGRESS NOTE ADULT - ASSESSMENT
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 SBO. No pneumoperitoneum.  Hosp course : SBO s/p diagnostic lap converted to exploratory laparotomy, sigmoidectomy, cecal enterotomy w/ stool expression and closure, descending colostomy, and abthera placement.    2/23 CT Brain Stroke Protocol : bilateral thalami and bilateral occipitotemporal with acute infarct/ischemic injury. No intracranial   hemorrhage or midline shift.        IMPRESSION;  Encephalopathy > non responsive off sedation  Neuro notes read  2/24 EEG : generalized slowing, no Sz activity  Very poor prognosis  Bacterial PNA secondary to MDR Klebsiella  CXR : no new consolidation  2/15 BAL Klebsiella  WBC 26.7    RECOMMENDATIONS;  No change in ABx  monitor WBC  Fetroja 2 gm iv q8h  Off loading to prevent pressure sores and preventive measures to avoid aspiration   Off loading to prevent pressure sores and preventive measures to avoid aspiration

## 2023-02-24 NOTE — PROGRESS NOTE ADULT - SUBJECTIVE AND OBJECTIVE BOX
SURGERY PROGRESS NOTE     Patient: RUBIO HUGHES , 82y ([birth year])Female   MRN: [id number]  Location: [address]  Visit: 02-10-23 Inpatient  Date: 02-24-23 @ 08:02       Events of past 24 hours:    Yesterday:  -Code stroke  -CTH: Patchy hypodensities in bilateral thalami and bilateral occipitotemporal regions like representing acute infarct/ischemic injury. No intracranial hemorrhage or midline shift.  -Holding therapeutic anticoagulation   -resumed trickle feeds/wean TPN  - OR cancelled per Dr. Rebollar     Today:  -hypertensive > labetalol 5 x 1  -Not following commands    PAST MEDICAL & SURGICAL HISTORY:  Atrial fibrillation  Hypertension  CVA (cerebrovascular accident)  HLD (hyperlipidemia)  GERD (gastroesophageal reflux disease)  History of cholecystectomy  History of hysterectomy  History of bladder suspension procedure    Vitals:   T(F): 98 (02-24-23 @ 04:00), Max: 98 (02-24-23 @ 04:00)  HR: 99 (02-24-23 @ 07:00)  BP: 139/62 (02-24-23 @ 07:00)  RR: 27 (02-24-23 @ 07:00)  SpO2: 97% (02-24-23 @ 07:00)  Mode: AC/ CMV (Assist Control/ Continuous Mandatory Ventilation), RR (machine): 10, TV (machine): 350, FiO2: 50, PEEP: 5, ITime: 1, MAP: 9, PIP: 17    Diet, NPO with Tube Feed:   Tube Feeding Modality: Nasogastric  Osmolite 1.0 Deion  Continuous  Starting Tube Feed Rate mL per Hour: 10  Increase Tube Feed Rate by (mL): 10     Every 4 hours  Until Goal Tube Feed Rate (mL per Hour): 20  Tube Feed Duration (in Hours): 24  Tube Feed Start Time: 11:00      Fluids:     I & O's:    02-23-23 @ 07:01  -  02-24-23 @ 07:00  --------------------------------------------------------  IN:    Dexmedetomidine: 64.2 mL    Fat Emulsion (Fish Oil &amp; Plant Based) 20% Infusion: 344.3 mL    IV PiggyBack: 100 mL    Osmolite: 420 mL    TPN (Total Parenteral Nutrition): 1495 mL  Total IN: 2423.5 mL    OUT:    Colostomy (mL): 400 mL    Indwelling Catheter - Urethral (mL): 2430 mL    Nasogastric/Oral tube (mL): 200 mL    VAC (Vacuum Assisted Closure) System (mL): 400 mL  Total OUT: 3430 mL    Total NET: -1006.5 mL           PHYSICAL EXAM:   General/Neuro:  Intubated, pupils briskly reactive to light, blinks eyes to threat but unable to follow commands, unresponsive to repeated noxious stimuli, b/l upper and lower extremity falls on motor drift, severe sensory loss noted in b/l upper and lower extremities  Cardiovascular : S1, S2.  Regular rate and rhythm.    GI: Abdomen:  Soft, nontender, mildly distended abdomen. No rigidity, guarding, or rebound tenderness. Abthera vac in place. L sided colostomy, pink, edematous. Ostomy functioning  MSK/Extremities: Warm & well-perfused. Peripheral pulses intact.   Derm: Good skin turgor, no skin breakdown.    : Donis catheter in place.      MEDICATIONS  (STANDING):  aspirin Suppository 300 milliGRAM(s) Rectal <User Schedule>  benzocaine 20% Spray 1 Spray(s) Topical once  cefiderocol IVPB 1500 milliGRAM(s) IV Intermittent every 8 hours  chlorhexidine 0.12% Liquid 15 milliLiter(s) Oral Mucosa every 12 hours  chlorhexidine 2% Cloths 1 Application(s) Topical <User Schedule>  dexMEDEtomidine Infusion 0.05 MICROgram(s)/kG/Hr (1.03 mL/Hr) IV Continuous <Continuous>  heparin   Injectable 5000 Unit(s) SubCutaneous every 8 hours  pantoprazole  Injectable 40 milliGRAM(s) IV Push every 24 hours  Parenteral Nutrition - Adult 1 Each (65 mL/Hr) TPN Continuous <Continuous>  potassium phosphate IVPB 15 milliMole(s) IV Intermittent once    MEDICATIONS  (PRN):  ondansetron Injectable 4 milliGRAM(s) IV Push every 6 hours PRN Nausea and/or Vomiting      DVT PROPHYLAXIS: heparin   Injectable 5000 Unit(s) SubCutaneous every 8 hours    GI PROPHYLAXIS: pantoprazole  Injectable 40 milliGRAM(s) IV Push every 24 hours    ANTICOAGULATION:   ANTIBIOTICS:  cefiderocol IVPB 1500 milliGRAM(s)            LAB/STUDIES:  Labs:  CAPILLARY BLOOD GLUCOSE      POCT Blood Glucose.: 147 mg/dL (23 Feb 2023 17:40)  POCT Blood Glucose.: 135 mg/dL (23 Feb 2023 08:58)                          7.8    26.70 )-----------( 482      ( 24 Feb 2023 05:50 )             25.1         02-24    144  |  105  |  28<H>  ----------------------------<  150<H>  3.7   |  32  |  0.6<L>      Calcium, Total Serum: 7.4 mg/dL (02-24-23 @ 05:50)      LFTs:     Blood Gas Arterial, Lactate: 1.50 mmol/L (02-24-23 @ 04:39)  Blood Gas Arterial, Lactate: 1.50 mmol/L (02-23-23 @ 17:28)  Blood Gas Arterial, Lactate: 1.10 mmol/L (02-23-23 @ 04:41)  Blood Gas Arterial, Lactate: 1.20 mmol/L (02-22-23 @ 16:07)  Blood Gas Arterial, Lactate: 1.50 mmol/L (02-22-23 @ 03:37)  Blood Gas Arterial, Lactate: 0.90 mmol/L (02-21-23 @ 22:53)    ABG - ( 24 Feb 2023 04:39 )  pH: 7.48  /  pCO2: 39    /  pO2: 67    / HCO3: 29    / Base Excess: 5.1   /  SaO2: 95.8            ABG - ( 23 Feb 2023 17:28 )  pH: 7.51  /  pCO2: 34    /  pO2: 218   / HCO3: 27    / Base Excess: 3.9   /  SaO2: 98.8            ABG - ( 23 Feb 2023 04:41 )  pH: 7.46  /  pCO2: 38    /  pO2: 124   / HCO3: 27    / Base Excess: 3.1   /  SaO2: 100.0             Coags:     10.50  ----< 0.92    ( 23 Feb 2023 06:30 )     21.4

## 2023-02-24 NOTE — PROGRESS NOTE ADULT - SUBJECTIVE AND OBJECTIVE BOX
ELLEN, RUBIO  82y, Female    All available historical data reviewed    OVERNIGHT EVENTS:  no fevers  non responsive  fio2 40%      ROS:  unable to obtain history secondary to patient's mental status     VITALS:  T(F): 98.4, Max: 98.7 (02-24-23 @ 08:00)  HR: 75  BP: 151/67  RR: 22Vital Signs Last 24 Hrs  T(C): 36.9 (24 Feb 2023 12:00), Max: 37.1 (24 Feb 2023 08:00)  T(F): 98.4 (24 Feb 2023 12:00), Max: 98.7 (24 Feb 2023 08:00)  HR: 75 (24 Feb 2023 12:00) (63 - 102)  BP: 151/67 (24 Feb 2023 12:00) (117/55 - 178/99)  BP(mean): 97 (24 Feb 2023 12:00) (79 - 132)  RR: 22 (24 Feb 2023 12:00) (16 - 30)  SpO2: 99% (24 Feb 2023 12:00) (93% - 100%)    Parameters below as of 24 Feb 2023 12:00  Patient On (Oxygen Delivery Method): ventilator    O2 Concentration (%): 40    TESTS & MEASUREMENTS:                        7.8    26.70 )-----------( 482      ( 24 Feb 2023 05:50 )             25.1     02-24    144  |  105  |  28<H>  ----------------------------<  150<H>  3.7   |  32  |  0.6<L>    Ca    7.4<L>      24 Feb 2023 05:50  Phos  2.9     02-24  Mg     2.0     02-24                RADIOLOGY & ADDITIONAL TESTS:  Personal review of radiological diagnostics performed  Echo and EKG results noted when applicable.     MEDICATIONS:  aspirin  chewable 81 milliGRAM(s) Oral daily  benzocaine 20% Spray 1 Spray(s) Topical once  cefiderocol IVPB 1500 milliGRAM(s) IV Intermittent every 8 hours  chlorhexidine 0.12% Liquid 15 milliLiter(s) Oral Mucosa every 12 hours  chlorhexidine 2% Cloths 1 Application(s) Topical <User Schedule>  dexMEDEtomidine Infusion 0.05 MICROgram(s)/kG/Hr IV Continuous <Continuous>  heparin   Injectable 5000 Unit(s) SubCutaneous every 8 hours  ondansetron Injectable 4 milliGRAM(s) IV Push every 6 hours PRN  pantoprazole  Injectable 40 milliGRAM(s) IV Push every 24 hours  Parenteral Nutrition - Adult 1 Each TPN Continuous <Continuous>  potassium phosphate IVPB 15 milliMole(s) IV Intermittent once      ANTIBIOTICS:  cefiderocol IVPB 1500 milliGRAM(s) IV Intermittent every 8 hours

## 2023-02-25 NOTE — PROGRESS NOTE ADULT - SUBJECTIVE AND OBJECTIVE BOX
RUBIO HUGHES  [id number]  82y Female    Indication for ICU admission: open abdomen s/p bowel resection for SBO  Admit Date:   ICU Date:    OR Date: ,     No Known Allergies    PAST MEDICAL & SURGICAL HISTORY:  Atrial fibrillation  Hypertension  CVA (cerebrovascular accident)  HLD (hyperlipidemia)  GERD (gastroesophageal reflux disease)  History of cholecystectomy  History of hysterectomy  History of bladder suspension procedure      24HRS EVENT:    Night   new LUE midline   AM AB.51/36/115/29      Day  - f/u CTH read--> stable  - Neuro- Keep glucose normal   - Advance TF to goal 40cc and wean TPN today  - attempt to place midline to try to DC TLC  - monitor WBC  - OR cancelled for today, neuro rec holding off on surgery until brain edema diminishes  - Possible RTOR Monday  - Switch ASA to oral  -unable to open eyes spontaneously, turned off precedex   -PVCs--> EKG  -Echo pending   -hypertensive, labetalol 10x1       REVIEW OF SYSTEMS  [ ] A ten-point review of systems was otherwise negative except as noted.  [ x] Due to altered mental status/intubation, subjective information were not able to be obtained from the patient. History was obtained, to the extent possible, from review of the chart and collateral sources of information  ******************************************************************************************************** RUBIO HUGHES  [id number]  82y Female    Indication for ICU admission: open abdomen s/p bowel resection for SBO  Admit Date:   ICU Date:    OR Date: ,     No Known Allergies    PAST MEDICAL & SURGICAL HISTORY:  Atrial fibrillation  Hypertension  CVA (cerebrovascular accident)  HLD (hyperlipidemia)  GERD (gastroesophageal reflux disease)  History of cholecystectomy  History of hysterectomy  History of bladder suspension procedure      24HRS EVENT:    Night   new LUE midline   AM AB.51/36/115/29      Day  - f/u CTH read--> stable  - Neuro- Keep glucose normal   - Advance TF to goal 40cc and wean TPN today  - attempt to place midline to try to DC TLC  - monitor WBC  - OR cancelled for today, neuro rec holding off on surgery until brain edema diminishes  - Possible RTOR Monday  - Switch ASA to oral  -unable to open eyes spontaneously, turned off precedex   -PVCs--> EKG  -Echo pending   -hypertensive, labetalol 10x1       REVIEW OF SYSTEMS  [ ] A ten-point review of systems was otherwise negative except as noted.  [ x] Due to altered mental status/intubation, subjective information were not able to be obtained from the patient. History was obtained, to the extent possible, from review of the chart and collateral sources of information  ********************************************************************************************************    Daily     Daily     Diet, NPO with Tube Feed:   Tube Feeding Modality: Nasogastric  Peptamen Intense VHP  Total Volume for 24 Hours (mL): 960  Continuous  Starting Tube Feed Rate mL per Hour: 30  Increase Tube Feed Rate by (mL): 10     Every 4 hours  Until Goal Tube Feed Rate (mL per Hour): 40  Tube Feed Duration (in Hours): 24  Tube Feed Start Time: 12:53 (23 @ 12:53)      CURRENT MEDS:  Neurologic Medications  dexMEDEtomidine Infusion 0.05 MICROgram(s)/kG/Hr IV Continuous <Continuous>  ondansetron Injectable 4 milliGRAM(s) IV Push every 6 hours PRN Nausea and/or Vomiting    Respiratory Medications    Cardiovascular Medications    Gastrointestinal Medications  pantoprazole  Injectable 40 milliGRAM(s) IV Push every 24 hours    Genitourinary Medications    Hematologic/Oncologic Medications  aspirin  chewable 81 milliGRAM(s) Oral daily  heparin   Injectable 5000 Unit(s) SubCutaneous every 8 hours    Antimicrobial/Immunologic Medications  cefiderocol IVPB 1500 milliGRAM(s) IV Intermittent every 8 hours    Endocrine/Metabolic Medications    Topical/Other Medications  benzocaine 20% Spray 1 Spray(s) Topical once  chlorhexidine 0.12% Liquid 15 milliLiter(s) Oral Mucosa every 12 hours  chlorhexidine 2% Cloths 1 Application(s) Topical <User Schedule>      ICU Vital Signs Last 24 Hrs  T(C): 37 (2023 04:00), Max: 37.1 (2023 00:00)  T(F): 98.6 (2023 04:00), Max: 98.7 (2023 00:00)  HR: 92 (2023 07:00) (74 - 92)  BP: 143/64 (2023 07:00) (139/62 - 187/72)  BP(mean): 92 (2023 07:00) (89 - 104)  ABP: --  ABP(mean): --  RR: 25 (2023 07:00) (21 - 27)  SpO2: 97% (2023 07:00) (95% - 100%)    O2 Parameters below as of 2023 06:00  Patient On (Oxygen Delivery Method): ventilator  O2 Flow (L/min): 40      Mode: AC/ CMV (Assist Control/ Continuous Mandatory Ventilation)  RR (machine): 10  TV (machine): 350  FiO2: 40  PEEP: 5  ITime: 1  MAP: 10  PIP: 21    ABG - ( 2023 03:18 )  pH, Arterial: 7.51  pH, Blood: x     /  pCO2: 36    /  pO2: 115   / HCO3: 29    / Base Excess: 5.3   /  SaO2: 100.0         I&O's Summary    2023 07:01  -  2023 07:00  --------------------------------------------------------  IN: 1469.2 mL / OUT: 2515 mL / NET: -1045.8 mL      I&O's Detail    2023 07:01  -  2023 07:00  --------------------------------------------------------  IN:    Dexmedetomidine: 74.2 mL    Osmolite: 780 mL    TPN (Total Parenteral Nutrition): 615 mL  Total IN: 1469.2 mL    OUT:    Colostomy (mL): 500 mL    Indwelling Catheter - Urethral (mL): 1615 mL    VAC (Vacuum Assisted Closure) System (mL): 400 mL  Total OUT: 2515 mL    Total NET: -1045.8 mL          PHYSICAL EXAM:    General/Neuro  RASS:             GCS: 8t     = E 3  / V  1t / M  4    Deficits:                             alert & oriented x 3, no focal deficits  Pupils:    Lungs:      clear to auscultation, Normal expansion/effort.     Cardiovascular : S1, S2.  Regular rate and rhythm. 2+  Peripheral edema   Cardiac Rhythm: Normal Sinus Rhythm    GI: Abdomen soft, Non-tender, Non-distended.    Nasogastric tube in place.  Colostomy  Wound: open midline incision w/ abthera vac     Extremities: Extremities warm, pink, well-perfused. Pulses:Rt     Lt    Derm: Good skin turgor, no skin breakdown.      :       Donis catheter in place.      CXR:     LABS:  CAPILLARY BLOOD GLUCOSE      POCT Blood Glucose.: 125 mg/dL (2023 05:58)  POCT Blood Glucose.: 127 mg/dL (2023 00:51)  POCT Blood Glucose.: 143 mg/dL (2023 18:30)  POCT Blood Glucose.: 156 mg/dL (2023 12:11)                          7.5    14.02 )-----------( 434      ( 2023 04:45 )             24.1       02-    142  |  107  |  27<H>  ----------------------------<  112<H>  4.0   |  28  |  <0.5<L>    Ca    7.4<L>      2023 04:45  Phos  2.4       Mg     2.0

## 2023-02-25 NOTE — PROGRESS NOTE ADULT - ATTENDING COMMENTS
Patient off sedation this am.  Minimal response to obnoxious stimuli.  Colostomy functioning.  Tube feeds at goal.  Video EEG completed    ASSESSMENT:  81 y/o female with Large Bowel Obstruction.  S/P Ex. Lap and Devon's procedure.  Temporary abdominal closure with Abthera.  New B/l Thalami and Occipitotemporal Brain Acute Infarcts.  Acute respiratory failure with hypoxia.  On mechanical ventilation.  Hypervolemia.  At risk for hemodynamic instability.  Moderate protein malnutrition.    PLAN:  - use sedation as needed  - intermittent neuro checks  - Neurology f/u   - on ASA  - continue Vent support; follow ABG  - keep MAP>65; hypervolemic and SBP in 150-160 - will diurese   - on tube feeds  - follow serum electrolytes and UOP  - on Cefiderocol as per ID Klebsiella PNA  - DVT prophylaxis

## 2023-02-25 NOTE — PROGRESS NOTE ADULT - SUBJECTIVE AND OBJECTIVE BOX
Neurology Progress Note    Interval History:    No issues overnight, no current new issues. 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 She has had nausea and emesis which she described as bilious associated with the pain. She has had no bowel movements for the past three days but states that she has had flatus as soon as today.  She reports having diarrhea five days ago. Patient denied hematochezia, hematemesis, chest pain, cough change from chronic baseline, fever, sick contacts, nor urinary symptoms. S/P Sigmoid colectomy with ostomy, sigmoid mass resection for SBO/LBO 8 days ago and on sedation vacation patient not responding. Last known well unclear. Stroke code called. CTH revealed bilateral posterior circulation ischemic strokes.           Vital Signs Last 24 Hrs  T(C): 36.5 (25 Feb 2023 12:00), Max: 37.1 (25 Feb 2023 00:00)  T(F): 97.7 (25 Feb 2023 12:00), Max: 98.7 (25 Feb 2023 00:00)  HR: 97 (25 Feb 2023 12:00) (74 - 98)  BP: 156/78 (25 Feb 2023 12:00) (139/62 - 187/72)  BP(mean): 111 (25 Feb 2023 12:00) (89 - 111)  RR: 24 (25 Feb 2023 12:00) (21 - 27)  SpO2: 97% (25 Feb 2023 12:00) (95% - 100%)    Parameters below as of 25 Feb 2023 12:00  Patient On (Oxygen Delivery Method): ventilator    O2 Concentration (%): 40    Neurological Exam:   Patient mechanically vented   Patient eyes closed, not following commands  Minimal movement spontaneously of bilateral UE   Reflexive movement of bilateral LE UE to noxious stimuli  No gaze reaction to visual threat  + gag and corneal relfex  Sensation: respond to noxious stimuli in all 4 limbs,   aphasia cerebellar exam limited by intubation and mental status    NIHSS 25      MEDICATIONS  (STANDING):  amLODIPine   Tablet 5 milliGRAM(s) Oral daily  aspirin  chewable 81 milliGRAM(s) Oral daily  benzocaine 20% Spray 1 Spray(s) Topical once  cefiderocol IVPB 1500 milliGRAM(s) IV Intermittent every 8 hours  chlorhexidine 0.12% Liquid 15 milliLiter(s) Oral Mucosa every 12 hours  chlorhexidine 2% Cloths 1 Application(s) Topical <User Schedule>  heparin   Injectable 5000 Unit(s) SubCutaneous every 8 hours  pantoprazole  Injectable 40 milliGRAM(s) IV Push every 24 hours  potassium chloride  10 mEq/50 mL IVPB 10 milliEquivalent(s) IV Intermittent once  simvastatin 20 milliGRAM(s) Oral at bedtime    MEDICATIONS  (PRN):  ondansetron Injectable 4 milliGRAM(s) IV Push every 6 hours PRN Nausea and/or Vomiting          LABS:  cret                        7.5    14.02 )-----------( 434      ( 25 Feb 2023 04:45 )             24.1     02-25    142  |  107  |  27<H>  ----------------------------<  112<H>  4.0   |  28  |  <0.5<L>    Ca    7.4<L>      25 Feb 2023 04:45  Phos  2.4     02-25  Mg     2.0     02-25

## 2023-02-25 NOTE — PROGRESS NOTE ADULT - ASSESSMENT
Assessment and Plan:   82y Female  : abd washout, abthera placed   : abdominal washout, no further resection   : Previous cecetomy site noted to be ischemic, omental patch repair, rest of bowel no ischemic changes, bowel edematous, abthera vac in place, abdomen open  : s/p Sigmoid colectomy with ostomy, sigmoid mass resection for SBO/LBO.     NEURO:  #Acute pain    -APAP IV prn    -Fentanyl 25 mcg prn q4  #Acute sedation    -Precedex infusion > held d/t stroke work up   #Code stroke    -CTH revealed patchy hypodensities in bilateral thalami and bilateral occipitotemporal regions like representing acute infarct/ischemic injury. No intracranial hemorrhage or midline shift.   -CTA: no LVO/AVM, left vertebral artery stenosis.  - 2nd CTH completed > no significant change in edema involving b/l thalamic and occipital lobes   -EEG: mild- mod generalized slowing, bilateral focal slowing over posterior quadrants with shifting asymmetry, R>L, borderline independent left frontal focal slowing, no seizures     RESP:   #Oxygenation  RR (machine): 10, TV (machine): 350, FiO2: 40, PEEP: 5  AM AB.51/36/115/29  -ET  22cm  -Daily CXR     CARDS:   #hypotension  hypovolemic shock-resolved  -Prev on levophed, off since 10:30 AM   -#Imaging/labs  Echo :  elevated LA pressures, Lv not well visualized suspect normal lv function, suggest lumason, distal anteroseptal hypokinesis, sclerotic Aov  -F/U echo   -PVCs--> EKG  -trop neg x 3  - C/s Cardiology Recs : TTE, complete full stroke workup, reconsult if needed     GI/NUTR:   #s/p Sigmoid colectomy with descending ostomy and resection of sigmoid mass, 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  - pathology -  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  attempts to close lower portion resulted in elevated peak airway pressures. Application of abthera vac dressing  -RTOR  for abdominal washout and replacement of abthera   -OR cancelled for today, hold off on surgery until brain edema diminishes as per neuro rec, reevaluate early next week   #Diet  -NPO with VHP@40cc/hr  -Off TPN now   #GI Prophylaxis    -Pantoprazole  #Bowel regimen    -HOLDING    /RENAL:   #urine output in critically ill    -indwelling rodriguez (placed 2023)  #GERA  (baseline Cr 0.8) > resolved     -peak 1.4    Labs:          BUN/Cr- 28/0.6  -->,  27/<0.5  -->          Electrolytes-Na 142 // K 4.0 // Mg 2.0 //  Phos 2.4 ( @ 04:45)      HEME/ONC:   Holding anticoagulation at this time, ASA 81   #DVT ppx: SCDs, HSQ  #acute anemia in the setting of open abdomen    Labs: Hb/Hct:  7.8/25.1  -->,  7.5/24.1  -->                      Plts:  482  -->,  434  -->                 PTT/INR:            ID:  WBC- 15.52  --->>,  26.70  --->>,  14.02  --->>  Temp trend- 24hrs T(F): 98.6 ( @ 04:00), Max: 98.7 ( @ 08:00)  Antibiotics-cefiderocol IVPB 1500 every 8 hours      ENDO:    -FSG q6 while NPO    -Glucose goal 140-180    -if above 180 start ISS     HA1C     LINES/DRAINS:  PIV, Rodriguez, ETT, Abdominal abthera vac, NGT    ADVANCED DIRECTIVES:  Full Code    INDICATION FOR SICU: HEMODYNAMIC MONITORING POSTOPERATIVE REQUIRING VASOPRESSORS AND MECHANICAL VENTILATION     Dispo: SICU Assessment and Plan:   82y Female  : abd washout, abthera placed   : abdominal washout, no further resection   : Previous cecetomy site noted to be ischemic, omental patch repair, rest of bowel no ischemic changes, bowel edematous, abthera vac in place, abdomen open  : s/p Sigmoid colectomy with ostomy, sigmoid mass resection for SBO/LBO.     NEURO:  #Acute pain    -APAP IV prn    -Fentanyl 25 mcg prn q4  #Acute sedation    -Precedex infusion > held d/t stroke work up   #Code stroke    -CTH revealed patchy hypodensities in bilateral thalami and bilateral occipitotemporal regions like representing acute infarct/ischemic injury. No intracranial hemorrhage or midline shift.   -CTA: no LVO/AVM, left vertebral artery stenosis.  - 2nd CTH completed > no significant change in edema involving b/l thalamic and occipital lobes   -EEG: mild- mod generalized slowing, bilateral focal slowing over posterior quadrants with shifting asymmetry, R>L, borderline independent left frontal focal slowing, Interictal activity - small number of R>L FT sharp transients    RESP:   #Oxygenation  RR (machine): 10, TV (machine): 350, FiO2: 40, PEEP: 5  AM AB.51/36/115/29  -ET  22cm  -Daily CXR     CARDS:   #hypotension  hypovolemic shock-resolved  -Prev on levophed, off since 10:30 AM   -#Imaging/labs  Echo :  elevated LA pressures, Lv not well visualized suspect normal lv function, suggest lumason, distal anteroseptal hypokinesis, sclerotic Aov  -F/U echo   -PVCs--> EKG  -trop neg x 3  - C/s Cardiology Recs : TTE, complete full stroke workup, reconsult if needed     GI/NUTR:   #s/p Sigmoid colectomy with descending ostomy and resection of sigmoid mass, 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  - pathology -  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  attempts to close lower portion resulted in elevated peak airway pressures. Application of abthera vac dressing  -RTOR  for abdominal washout and replacement of abthera   -OR cancelled for today, hold off on surgery until brain edema diminishes as per neuro rec, reevaluate early next week   #Diet  -NPO with VHP@40cc/hr  -Off TPN now   #GI Prophylaxis    -Pantoprazole  #Bowel regimen    -HOLDING    /RENAL:   #urine output in critically ill    -indwelling rodriguez (placed 2023)  #GERA  (baseline Cr 0.8) > resolved     -peak 1.4    Labs:          BUN/Cr- 28/0.6  -->,  27/<0.5  -->          Electrolytes-Na 142 // K 4.0 // Mg 2.0 //  Phos 2.4 ( @ 04:45)      HEME/ONC:   Holding anticoagulation at this time, ASA 81   #DVT ppx: SCDs, HSQ  #acute anemia in the setting of open abdomen    Labs: Hb/Hct:  7.8/25.1  -->,  7.5/24.1  -->                      Plts:  482  -->,  434  -->                 PTT/INR:            ID:  WBC- 15.52  --->>,  26.70  --->>,  14.02  --->>  Temp trend- 24hrs T(F): 98.6 ( @ 04:00), Max: 98.7 ( @ 08:00)  Antibiotics-cefiderocol IVPB 1500 every 8 hours      ENDO:    -FSG q6 while NPO    -Glucose goal 140-180    -if above 180 start ISS     HA1C     LINES/DRAINS:  PIV, Rodriguez, ETT, Abdominal abthera vac, NGT    ADVANCED DIRECTIVES:  Full Code    INDICATION FOR SICU: HEMODYNAMIC MONITORING POSTOPERATIVE REQUIRING VASOPRESSORS AND MECHANICAL VENTILATION     Dispo: SICU

## 2023-02-25 NOTE — PROGRESS NOTE ADULT - ASSESSMENT
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 S/p Sigmoid colectomy with ostomy, sigmoid mass resection for SBO/LBO 7 days ago and now on sedation vacation patient not responding. Last known well unclear. Abdomen remains open. Stroke code called. Out of the window for IV thrombolytics. Patient with no LVO therefore not a candidate for IA intervention. CTH revealed Patchy hypodensities in bilateral thalami and bilateral occipitotemporal regions like representing acute infarct/ischemic injury. Etiology unclear at this time, differentials include embolic or flow related ischemic strokes, less likely infectious or metastatic etiology. vEEG did not show any seizures, but transients of sharp waves.     Suggestion:  MRI brain with and without lynda when stable and if no contraindications.  Continue VEEG   Seizure precautions  Keep magnesium >2  Continue aspirin 300mg Suppository  Start atorvastatin 80 mg as soon as oral intake is allowed   Telemetry monitoring.   Q4 neuro checks.   Call with questions or change in neuro status. [phone]

## 2023-02-25 NOTE — PROGRESS NOTE ADULT - SUBJECTIVE AND OBJECTIVE BOX
GENERAL SURGERY PROGRESS NOTE    Admission: Other specified disorders of intestines    24 Hour Events:  LUE midline placed  Weaning sedation, though minimal mental status    Vitals:  T(F): 98.7 (02-25-23 @ 00:00), Max: 98.7 (02-24-23 @ 08:00)  HR: 86 (02-25-23 @ 02:45)  BP: 141/60 (02-25-23 @ 02:00)  RR: 21 (02-25-23 @ 02:00)  SpO2: 97% (02-25-23 @ 02:45)  Mode: AC/ CMV (Assist Control/ Continuous Mandatory Ventilation), RR (machine): 10, TV (machine): 350, FiO2: 40, PEEP: 5, ITime: 1, MAP: 10, PIP: 21    Diet, NPO with Tube Feed:   Tube Feeding Modality: Nasogastric  Peptamen Intense VHP  Total Volume for 24 Hours (mL): 960  Continuous  Starting Tube Feed Rate mL per Hour: 30  Increase Tube Feed Rate by (mL): 10     Every 4 hours  Until Goal Tube Feed Rate (mL per Hour): 40  Tube Feed Duration (in Hours): 24  Tube Feed Start Time: 12:53    Fluids:     I & O's:    02-23-23 @ 07:01  -  02-24-23 @ 07:00  --------------------------------------------------------  IN:    Dexmedetomidine: 64.2 mL    Fat Emulsion (Fish Oil &amp; Plant Based) 20% Infusion: 344.3 mL    IV PiggyBack: 100 mL    Osmolite: 420 mL    TPN (Total Parenteral Nutrition): 1495 mL  Total IN: 2423.5 mL    OUT:    Colostomy (mL): 400 mL    Indwelling Catheter - Urethral (mL): 2430 mL    Nasogastric/Oral tube (mL): 200 mL    VAC (Vacuum Assisted Closure) System (mL): 400 mL  Total OUT: 3430 mL    Total NET: -1006.5 mL    PHYSICAL EXAM:  General: NAD, sedated, intubated  Cardiac: RRR, S1/S2 identified, nmrg  Respiratory: unlabored breathing at rest  Abdomen: Soft, non-distended  Musculoskeletal: FROM in b/l UE and LE  Neuro: Sensation grossly intact and equal throughout, no focal deficits  Skin: Warm/dry, normal color, no jaundice  Incision/wound: Clean, dry, and intact. Abthera in place    MEDICATIONS  (STANDING):  aspirin  chewable 81 milliGRAM(s) Oral daily  benzocaine 20% Spray 1 Spray(s) Topical once  cefiderocol IVPB 1500 milliGRAM(s) IV Intermittent every 8 hours  chlorhexidine 0.12% Liquid 15 milliLiter(s) Oral Mucosa every 12 hours  chlorhexidine 2% Cloths 1 Application(s) Topical <User Schedule>  dexMEDEtomidine Infusion 0.05 MICROgram(s)/kG/Hr (1.03 mL/Hr) IV Continuous <Continuous>  heparin   Injectable 5000 Unit(s) SubCutaneous every 8 hours  pantoprazole  Injectable 40 milliGRAM(s) IV Push every 24 hours  potassium phosphate IVPB 15 milliMole(s) IV Intermittent once    MEDICATIONS  (PRN):  ondansetron Injectable 4 milliGRAM(s) IV Push every 6 hours PRN Nausea and/or Vomiting      DVT PROPHYLAXIS: heparin   Injectable 5000 Unit(s) SubCutaneous every 8 hours    GI PROPHYLAXIS: pantoprazole  Injectable 40 milliGRAM(s) IV Push every 24 hours    ANTICOAGULATION:   ANTIBIOTICS:  cefiderocol IVPB 1500 milliGRAM(s)    LAB/STUDIES:  Labs:  CAPILLARY BLOOD GLUCOSE      POCT Blood Glucose.: 127 mg/dL (25 Feb 2023 00:51)  POCT Blood Glucose.: 143 mg/dL (24 Feb 2023 18:30)  POCT Blood Glucose.: 156 mg/dL (24 Feb 2023 12:11)                          7.8    26.70 )-----------( 482      ( 24 Feb 2023 05:50 )             25.1         02-24    144  |  105  |  28<H>  ----------------------------<  150<H>  3.7   |  32  |  0.6<L>      Calcium, Total Serum: 7.4 mg/dL (02-24-23 @ 05:50)    LFTs:     Blood Gas Arterial, Lactate: 1.50 mmol/L (02-24-23 @ 04:39)  Blood Gas Arterial, Lactate: 1.50 mmol/L (02-23-23 @ 17:28)  Blood Gas Arterial, Lactate: 1.10 mmol/L (02-23-23 @ 04:41)  Blood Gas Arterial, Lactate: 1.20 mmol/L (02-22-23 @ 16:07)    ABG - ( 24 Feb 2023 04:39 )  pH: 7.48  /  pCO2: 39    /  pO2: 67    / HCO3: 29    / Base Excess: 5.1   /  SaO2: 95.8      ABG - ( 23 Feb 2023 17:28 )  pH: 7.51  /  pCO2: 34    /  pO2: 218   / HCO3: 27    / Base Excess: 3.9   /  SaO2: 98.8      ABG - ( 23 Feb 2023 04:41 )  pH: 7.46  /  pCO2: 38    /  pO2: 124   / HCO3: 27    / Base Excess: 3.1   /  SaO2: 100.0     Coags:     10.50  ----< 0.92    ( 23 Feb 2023 06:30 )     21.4

## 2023-02-25 NOTE — PROGRESS NOTE ADULT - ASSESSMENT
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 SBO. No pneumoperitoneum.  Hosp course : SBO s/p diagnostic lap converted to exploratory laparotomy, sigmoidectomy, cecal enterotomy w/ stool expression and closure, descending colostomy, and abthera placement.    2/23 CT Brain Stroke Protocol : bilateral thalami and bilateral occipitotemporal with acute infarct/ischemic injury. No intracranial   hemorrhage or midline shift.    IMPRESSION;  Encephalopathy > non responsive off sedation  Neuro notes read  2/24 EEG : generalized slowing, no Sz activity  Very poor prognosis  Bacterial PNA secondary to MDR Klebsiella  CXR : no new consolidation  2/15 BAL Klebsiella  WBC 26.7    RECOMMENDATIONS;  No change in ABx  monitor WBC  Fetroja 2 gm iv q8h  Off loading to prevent pressure sores and preventive measures to avoid aspiration     Trauma/ACS  SPECTRA [id number]

## 2023-02-26 NOTE — PROGRESS NOTE ADULT - SUBJECTIVE AND OBJECTIVE BOX
RUBIO HUGHES  [id number]  82y Female    Indication for ICU admission: open abdomen s/p bowel resection for SBO  Admit Date: 2/9  ICU Date:  2/14  OR Date: 2/14, 2/16    No Known Allergies    PAST MEDICAL & SURGICAL HISTORY:  Atrial fibrillation  Hypertension  CVA (cerebrovascular accident)  HLD (hyperlipidemia)  GERD (gastroesophageal reflux disease)  History of cholecystectomy  History of hysterectomy  History of bladder suspension procedure      24HRS EVENT:    2/25  NIGHT  Tachypneic   500cc bolus  Started precedex  NaPhos given    DAY:  -off Precedex  - EEG read with small number of R>L FT sharp transients  -neurology- no anti-seizure meds, poor prognosis  -restarted home statin, Amlodipine  metolazone 5mg, lasix 40mg x1, BMP @1600  -NS 1.5L, NS @ 50cc/hr        REVIEW OF SYSTEMS  [ ] A ten-point review of systems was otherwise negative except as noted.  [ x] Due to altered mental status/intubation, subjective information were not able to be obtained from the patient. History was obtained, to the extent possible, from review of the chart and collateral sources of information  ********************************************************************************************************   RUBIO HUGHES  [id number]  82y Female    Indication for ICU admission: open abdomen s/p bowel resection for SBO  Admit Date: 2/9  ICU Date:  2/14  OR Date: 2/14, 2/16    No Known Allergies    PAST MEDICAL & SURGICAL HISTORY:  Atrial fibrillation  Hypertension  CVA (cerebrovascular accident)  HLD (hyperlipidemia)  GERD (gastroesophageal reflux disease)  History of cholecystectomy  History of hysterectomy  History of bladder suspension procedure      24HRS EVENT:    2/25  NIGHT  Tachypneic   500cc bolus  Started precedex  NaPhos given    DAY:  -off Precedex  - EEG read with small number of R>L FT sharp transients  -neurology- no anti-seizure meds, poor prognosis  -restarted home statin, Amlodipine  metolazone 5mg, lasix 40mg x1, BMP @1600  -NS 1.5L, NS @ 50cc/hr    Physical Exam:  Neuro- pupils 3 mm and reactive to light, opens spontaneously and intermittently to voice- name call, not following commands, withdraws to pain - left side  Resp- right- clear, left, fine crackles, slightly decreased breath sounds compared to right, no wheezing  Cardiac- S1 S2, RRR, no murmur  Abd- soft, NT, ND, + bowel sounds, VAC dressing to midline- good seal, Colostomy functioning with liquid brown stool    REVIEW OF SYSTEMS  [ ] A ten-point review of systems was otherwise negative except as noted.  [ x] Due to altered mental status/intubation, subjective information were not able to be obtained from the patient. History was obtained, to the extent possible, from review of the chart and collateral sources of information  ********************************************************************************************************

## 2023-02-26 NOTE — PROGRESS NOTE ADULT - ATTENDING COMMENTS
Patient remains on a vent.  Neuro exam remains same - opens eyes intermittently and has very minimal reflex to painful stimuli.  On tube feeds at goal, colostomy functioning  Abdomen: soft, Vac with good seal.  Fluid balance -1.8 L over the past 24 h.    ASSESSMENT:  81 y/o female with Large Bowel Obstruction.  S/P Ex. Lap and Devon's procedure.  Temporary abdominal closure with Abthera.  New B/l Thalami and Occipitotemporal Brain Acute Infarcts.  Acute respiratory failure with hypoxia.  On mechanical ventilation.  Hypervolemia.  At risk for hemodynamic instability.  Moderate protein malnutrition.    PLAN:  - SAT today  - intermittent neuro checks; on ASA  - Neurology to follow  - will need MRI brain as Neurology  - continue Vent support; follow ABG  - keep MAP>65; euvolemic at this time  - tube feeds at goal  - follow serum electrolytes and UOP  - on Cefiderocol as per ID Klebsiella PNA  - DVT prophylaxis   Prognosis guarded

## 2023-02-26 NOTE — PROGRESS NOTE ADULT - SUBJECTIVE AND OBJECTIVE BOX
SURGERY PROGRESS NOTE     Patient: RUBIO HUGHES , 82y ([birth year])Female   MRN: [id number]  Location: [address]  Visit: 02-10-23 Inpatient  Date: 02-26-23 @ 07:08       Events of past 24 hours:    Patient seen resting in bed.   Patient had neurological exam off sedation yesterday and precedex was held for that  Patient tachypnic overnight to 30s; precedex restarted.   Fluid behind - given 500cc bolus     PAST MEDICAL & SURGICAL HISTORY:  Atrial fibrillation      Hypertension      CVA (cerebrovascular accident)      HLD (hyperlipidemia)      GERD (gastroesophageal reflux disease)      History of cholecystectomy      History of hysterectomy      History of bladder suspension procedure           Vitals:   T(F): 98.9 (02-26-23 @ 04:00), Max: 98.9 (02-26-23 @ 04:00)  HR: 85 (02-26-23 @ 07:00)  BP: 118/55 (02-26-23 @ 07:00)  RR: 30 (02-26-23 @ 07:00)  SpO2: 96% (02-26-23 @ 07:00)  Mode: AC/ CMV (Assist Control/ Continuous Mandatory Ventilation), RR (machine): 10, TV (machine): 350, FiO2: 40, PEEP: 5, ITime: 1, MAP: 11, PIP: 24    Diet, NPO with Tube Feed:   Tube Feeding Modality: Nasogastric  Peptamen Intense VHP  Total Volume for 24 Hours (mL): 960  Continuous  Starting Tube Feed Rate mL per Hour: 30  Increase Tube Feed Rate by (mL): 10     Every 4 hours  Until Goal Tube Feed Rate (mL per Hour): 40  Tube Feed Duration (in Hours): 24  Tube Feed Start Time: 12:53      Fluids: sodium chloride 0.9%.: Solution, 1000 milliLiter(s) infuse at 50 mL/Hr  Provider's Contact #: [phone]      I & O's:    02-25-23 @ 07:01  -  02-26-23 @ 07:00  --------------------------------------------------------  IN:    Dexmedetomidine: 69.8 mL    IV PiggyBack: 100 mL    IV PiggyBack: 450 mL    IV PiggyBack: 300 mL    IV PiggyBack: 250 mL    Osmolite: 640 mL    sodium chloride 0.9%: 650 mL    Sodium Chloride 0.9% Bolus: 1000 mL    Vital High Protein: 160 mL  Total IN: 3619.8 mL    OUT:    Colostomy (mL): 350 mL    Indwelling Catheter - Urethral (mL): 4375 mL    VAC (Vacuum Assisted Closure) System (mL): 100 mL  Total OUT: 4825 mL    Total NET: -1205.2 mL           PHYSICAL EXAM:   General: NAD, sedated, intubated  Cardiac: RRR, S1/S2 identified, nmrg  Respiratory: unlabored breathing at rest  Abdomen: Soft, non-distended  Skin: Warm/dry, normal color, no jaundice  Incision/wound: Clean, dry, and intact. Abthera in place      MEDICATIONS  (STANDING):  acetaminophen     Tablet .. 1000 milliGRAM(s) Oral every 8 hours  amLODIPine   Tablet 5 milliGRAM(s) Oral daily  aspirin  chewable 81 milliGRAM(s) Oral daily  atorvastatin 80 milliGRAM(s) Oral at bedtime  benzocaine 20% Spray 1 Spray(s) Topical once  cefiderocol IVPB 1500 milliGRAM(s) IV Intermittent every 8 hours  chlorhexidine 0.12% Liquid 15 milliLiter(s) Oral Mucosa every 12 hours  chlorhexidine 2% Cloths 1 Application(s) Topical <User Schedule>  dexMEDEtomidine Infusion 0.1 MICROgram(s)/kG/Hr (2.05 mL/Hr) IV Continuous <Continuous>  gabapentin 100 milliGRAM(s) Oral every 8 hours  heparin   Injectable 5000 Unit(s) SubCutaneous every 8 hours  naloxone Injectable 0.4 milliGRAM(s) IV Push once  pantoprazole  Injectable 40 milliGRAM(s) IV Push every 24 hours  sodium chloride 0.9%. 1000 milliLiter(s) (50 mL/Hr) IV Continuous <Continuous>    MEDICATIONS  (PRN):  fentaNYL    Injectable 25 MICROGram(s) IV Push every 4 hours PRN AGITATION/TACHYPNEA  ondansetron Injectable 4 milliGRAM(s) IV Push every 6 hours PRN Nausea and/or Vomiting  oxyCODONE    IR 2.5 milliGRAM(s) Oral every 4 hours PRN Moderate Pain (4 - 6)  oxyCODONE    IR 5 milliGRAM(s) Oral every 4 hours PRN Severe Pain (7 - 10)      DVT PROPHYLAXIS: heparin   Injectable 5000 Unit(s) SubCutaneous every 8 hours    GI PROPHYLAXIS: pantoprazole  Injectable 40 milliGRAM(s) IV Push every 24 hours    ANTICOAGULATION:   ANTIBIOTICS:  cefiderocol IVPB 1500 milliGRAM(s)            LAB/STUDIES:  Labs:  CAPILLARY BLOOD GLUCOSE      POCT Blood Glucose.: 134 mg/dL (26 Feb 2023 05:44)  POCT Blood Glucose.: 108 mg/dL (25 Feb 2023 17:29)                          8.7    20.08 )-----------( 607      ( 25 Feb 2023 20:34 )             27.8       Auto Neutrophil %: 86.4 % (02-25-23 @ 20:34)  Auto Immature Granulocyte %: 2.8 % (02-25-23 @ 20:34)    02-25    137  |  101  |  23<H>  ----------------------------<  105<H>  4.6   |  25  |  0.5<L>      Calcium, Total Serum: 7.6 mg/dL (02-25-23 @ 20:34)      LFTs:     Blood Gas Arterial, Lactate: 1.40 mmol/L (02-26-23 @ 03:26)  Blood Gas Arterial, Lactate: 0.90 mmol/L (02-25-23 @ 17:46)  Blood Gas Arterial, Lactate: 0.80 mmol/L (02-25-23 @ 03:18)  Blood Gas Arterial, Lactate: 1.50 mmol/L (02-24-23 @ 04:39)  Blood Gas Arterial, Lactate: 1.50 mmol/L (02-23-23 @ 17:28)    ABG - ( 26 Feb 2023 03:26 )  pH: 7.54  /  pCO2: 31    /  pO2: 156   / HCO3: 26    / Base Excess: 3.9   /  SaO2: 100.0           ABG - ( 25 Feb 2023 17:46 )  pH: 7.56  /  pCO2: 32    /  pO2: 72    / HCO3: 29    / Base Excess: 6.6   /  SaO2: 96.6            ABG - ( 25 Feb 2023 03:18 )  pH: 7.51  /  pCO2: 36    /  pO2: 115   / HCO3: 29    / Base Excess: 5.3   /  SaO2: 100.0

## 2023-02-26 NOTE — PROGRESS NOTE ADULT - ASSESSMENT
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 SBO. No pneumoperitoneum.  Hosp course : SBO s/p diagnostic lap converted to exploratory laparotomy, sigmoidectomy, cecal enterotomy w/ stool expression and closure, descending colostomy, and abthera placement.    2/23 CT Brain Stroke Protocol : bilateral thalami and bilateral occipitotemporal with acute infarct/ischemic injury. No intracranial   hemorrhage or midline shift.    IMPRESSION;  Encephalopathy > non responsive off sedation  Neuro notes read  2/24 EEG : generalized slowing, no Sz activity  Very poor prognosis  Bacterial PNA secondary to MDR Klebsiella  CXR : no new consolidation  2/15 BAL Klebsiella  WBC 26.7    RECOMMENDATIONS;  No change in ABx  monitor WBC  Fetroja 2 gm iv q8h  Off loading to prevent pressure sores and preventive measures to avoid aspiration

## 2023-02-26 NOTE — PROGRESS NOTE ADULT - ASSESSMENT
82y female w/ sbo, sigmoid mass w/ pneumoperitoneum      2/21: abd washout, abthera placed   2/19: abdominal washout, no further resection   2/16: Previous cecetomy site noted to be ischemic, omental patch repair, rest of bowel no ischemic changes, bowel edematous, abthera vac in place, abdomen open  2/14: s/p Sigmoid colectomy with ostomy, sigmoid mass resection for SBO/LBO.     NEURO:  #Acute pain    -APAP IV prn    -Fentanyl 25 mcg prn q4  #Acute sedation    -Precedex infusion > held  #Code stroke 2/23   -CTH revealed patchy hypodensities in bilateral thalami and bilateral occipitotemporal regions like representing acute infarct/ischemic injury. No intracranial hemorrhage or midline shift.   -CTA: no LVO/AVM, left vertebral artery stenosis.  - 2nd CTH completed > no significant change in edema involving b/l thalamic and occipital lobes   -EEG: mild- mod generalized slowing, bilateral focal slowing over posterior quadrants with shifting asymmetry, R>L, borderline independent left frontal focal slowing,     RESP:   #Oxygenation  RR (machine): 10, TV (machine): 350, FiO2: 40, PEEP: 5  02-26 @ 03:26--7.54 / 31 / 156 / 26 / 100.0  O2 100.0  Lac 1.40    02-25 @ 17:46--7.56 / 32 / 72 / 29 / 96.6  O2 96.6  Lac 0.90    -ET  22cm  -Daily CXR     CARDS:   #hypotension 2/2 hypovolemic shock-resolved  -Prev on levophed, off since 10:30 AM 2/20  -#Imaging/labs  Echo 2/18:  elevated LA pressures, Lv not well visualized suspect normal lv function, suggest lumason, distal anteroseptal hypokinesis, sclerotic Aov  -F/U echo pending  -PVCs--> EKG  -trop neg x 3  - C/s Cardiology Recs 2/23: TTE, complete full stroke workup, reconsult if needed     GI/NUTR:   #s/p Sigmoid colectomy with descending ostomy and resection of sigmoid mass, 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  - pathology -  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  attempts to close lower portion resulted in elevated peak airway pressures. Application of abthera vac dressing  -RTOR 2/21 for abdominal washout and replacement of abthera   -OR cancelled for today, hold off on surgery until brain edema diminishes as per neuro rec, reevaluate early next week   #Diet  -NPO with VHP@40cc/hr  -Off TPN now   #GI Prophylaxis    -Pantoprazole  #Bowel regimen    -HOLDING    /RENAL:   #urine output in critically ill    -indwelling rodriguez (placed 2/14/2023)  #GERA  (baseline Cr 0.8) > resolved     -peak 1.4  #urine output in critically ill    -indwelling rodriguez (placed           BUN/Cr- 25/0.5  -->,  23/0.5  -->         Electrolytes-Na 137 // K 4.6 // Mg 2.4 //  Phos 2.7 (02-25 @ 20:34)  #active diuresis  -lasix 40mg, metolazone 5mg x1 (goal -1L)  -NS 500cc bolus, NS @ 50cc/hr    HEME/ONC:   Holding anticoagulation at this time, ASA 81   #DVT ppx: SCDs, HSQ  #acute anemia in the setting of open abdomen    Labs: Hb/Hct:  7.5/24.1  -->,  8.7/27.8  -->                      Plts:  434  -->,  607  -->                 PTT/INR:        ID:  WBC- 26.70  --->>,  14.02  --->>,  20.08  --->>  Temp trend- 24hrs T(F): 98.9 (02-26 @ 04:00), Max: 98.9 (02-26 @ 04:00)  Current antibiotics-cefiderocol IVPB 1500 every 8 hours    ENDO:    -FSG q6 while NPO    -Glucose goal 140-180    -if above 180 start ISS     HA1C     LINES/DRAINS:  PIV, Rodriguez, ETT, Abdominal abthera vac, NGT    ADVANCED DIRECTIVES:  Full Code    INDICATION FOR SICU: HEMODYNAMIC MONITORING POSTOPERATIVE REQUIRING VASOPRESSORS AND MECHANICAL VENTILATION     Dispo: SICU     82y female w/ sbo, sigmoid mass w/ pneumoperitoneum      2/21: abd washout, abthera placed   2/19: abdominal washout, no further resection   2/16: Previous cecetomy site noted to be ischemic, omental patch repair, rest of bowel no ischemic changes, bowel edematous, abthera vac in place, abdomen open  2/14: s/p Sigmoid colectomy with ostomy, sigmoid mass resection for SBO/LBO.     NEURO:  #Acute pain    -APAP IV prn    -Fentanyl 25 mcg prn q4  #Acute sedation    -Precedex infusion > held  #Code stroke 2/23   -CTH revealed patchy hypodensities in bilateral thalami and bilateral occipitotemporal regions like representing acute infarct/ischemic injury. No intracranial hemorrhage or midline shift.   -CTA: no LVO/AVM, left vertebral artery stenosis.  - 2nd CTH completed > no significant change in edema involving b/l thalamic and occipital lobes   -EEG: mild- mod generalized slowing, bilateral focal slowing over posterior quadrants with shifting asymmetry, R>L, borderline independent left frontal focal slowing,     RESP:   #Oxygenation  RR (machine): 10, TV (machine): 350, FiO2: 40, PEEP: 5  02-26 @ 03:26--7.54 / 31 / 156 / 26 / 100.0  O2 100.0  Lac 1.40    02-25 @ 17:46--7.56 / 32 / 72 / 29 / 96.6  O2 96.6  Lac 0.90    -ET  22cm  -Daily CXR     CARDS:   #hypotension 2/2 hypovolemic shock-resolved  -Prev on levophed, off since 10:30 AM 2/20  -#Imaging/labs  Echo 2/18:  elevated LA pressures, Lv not well visualized suspect normal lv function, suggest lumason, distal anteroseptal hypokinesis, sclerotic Aov  -F/U echo pending  -PVCs--> EKG  -trop neg x 3  - C/s Cardiology Recs 2/23: TTE, complete full stroke workup, reconsult if needed     GI/NUTR:   #s/p Sigmoid colectomy with descending ostomy and resection of sigmoid mass, 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  - pathology -  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  attempts to close lower portion resulted in elevated peak airway pressures. Application of abthera vac dressing  -RTOR 2/21 for abdominal washout and replacement of abthera   -#Diet  -NPO with VHP@40cc/hr  -Off TPN now   #GI Prophylaxis    -Pantoprazole  #Bowel regimen    -HOLDING    /RENAL:   #urine output in critically ill    -indwelling rodriguez (placed 2/14/2023)  #GERA  (baseline Cr 0.8) > resolved     -peak 1.4  #urine output in critically ill    -indwelling rodriguez (placed           BUN/Cr- 25/0.5  -->,  23/0.5  -->         Electrolytes-Na 137 // K 4.6 // Mg 2.4 //  Phos 2.7 (02-25 @ 20:34)  #active diuresis  -lasix 40mg, metolazone 5mg x1 (goal -1L), However pt became tachycardiac after diuresis  -3 L given over 12 hours for intravascular volume depletion    HEME/ONC:   Holding anticoagulation at this time, ASA 81   #DVT ppx: SCDs, HSQ  #acute anemia in the setting of open abdomen    Labs: Hb/Hct:  7.5/24.1  -->,  8.7/27.8  -->                      Plts:  434  -->,  607  -->                 PTT/INR:        ID:  WBC- 26.70  --->>,  14.02  --->>,  20.08    Temp trend- 24hrs T(F): 98.9 (02-26 @ 04:00), Max: 98.9 (02-26 @ 04:00)  Current antibiotics-cefiderocol IVPB 1500 every 8 hours    ENDO:    -FSG q6 while NPO    -Glucose goal 140-180    -if above 180 start ISS     HA1C -pending    LINES/DRAINS:  PIV, Rodriguez, ETT, Abdominal abthera vac, NGT    ADVANCED DIRECTIVES:  Full Code    INDICATION FOR SICU: HEMODYNAMIC MONITORING POSTOPERATIVE REQUIRING VASOPRESSORS AND MECHANICAL VENTILATION     Dispo: SICU  Discussed with Dr. Woodward

## 2023-02-27 NOTE — PROGRESS NOTE ADULT - SUBJECTIVE AND OBJECTIVE BOX
GENERAL SURGERY PROGRESS NOTE    Patient: RUBIO HUGHES , 82y ([birth year])Female   MRN: [id number]  Location: [address]  Visit: 02-10-23 Inpatient  Date: 02-27-23 @ 02:00    Events of past 24 hours:  Thick secretions for ETT.  Off sedation but does not follow commands.  Per neuro, MRI brain not urgent    PAST MEDICAL & SURGICAL HISTORY:  Atrial fibrillation    Hypertension    CVA (cerebrovascular accident)    HLD (hyperlipidemia)    GERD (gastroesophageal reflux disease)    History of cholecystectomy    History of hysterectomy    History of bladder suspension procedure      Vitals:   T(F): 97.9 (02-26-23 @ 16:00), Max: 98.9 (02-26-23 @ 04:00)  HR: 81 (02-26-23 @ 22:00)  BP: 129/60 (02-26-23 @ 22:00)  RR: 23 (02-26-23 @ 22:00)  SpO2: 100% (02-26-23 @ 22:00)  Mode: AC/ CMV (Assist Control/ Continuous Mandatory Ventilation), RR (machine): 10, TV (machine): 350, FiO2: 40, PEEP: 5, ITime: 1, MAP: 8, PIP: 17    Diet, NPO with Tube Feed:   Tube Feeding Modality: Nasogastric  Peptamen Intense VHP  Total Volume for 24 Hours (mL): 960  Continuous  Starting Tube Feed Rate mL per Hour: 30  Increase Tube Feed Rate by (mL): 10     Every 4 hours  Until Goal Tube Feed Rate (mL per Hour): 40  Tube Feed Duration (in Hours): 24  Tube Feed Start Time: 12:53      Fluids: sodium chloride 0.9%.: Solution, 1000 milliLiter(s) infuse at 90 mL/Hr  Provider's Contact #: [phone]      I & O's:    02-25-23 @ 07:01  -  02-26-23 @ 07:00  --------------------------------------------------------  IN:    Dexmedetomidine: 94.4 mL    IV PiggyBack: 250 mL    IV PiggyBack: 700 mL    IV PiggyBack: 300 mL    IV PiggyBack: 100 mL    Osmolite: 640 mL    sodium chloride 0.9%: 800 mL    Sodium Chloride 0.9% Bolus: 1000 mL    Vital High Protein: 280 mL  Total IN: 4164.4 mL    OUT:    Colostomy (mL): 350 mL    Indwelling Catheter - Urethral (mL): 5225 mL    VAC (Vacuum Assisted Closure) System (mL): 425 mL  Total OUT: 6000 mL    Total NET: -1835.6 mL    PHYSICAL EXAM:  General: Intubated.  Cardiac: RRR.  Respiratory: On mech vent via ETT. Bilateral chest rise.  Abdomen: Soft, non-distended, no rebound, no guarding. Abthera in place on suction.  Skin: Warm/dry, normal color, no jaundice.      MEDICATIONS  (STANDING):  acetaminophen     Tablet .. 1000 milliGRAM(s) Oral every 8 hours  amLODIPine   Tablet 5 milliGRAM(s) Oral daily  aspirin  chewable 81 milliGRAM(s) Oral daily  atorvastatin 80 milliGRAM(s) Oral at bedtime  benzocaine 20% Spray 1 Spray(s) Topical once  cefiderocol IVPB 1500 milliGRAM(s) IV Intermittent every 8 hours  chlorhexidine 0.12% Liquid 15 milliLiter(s) Oral Mucosa every 12 hours  chlorhexidine 2% Cloths 1 Application(s) Topical <User Schedule>  dexMEDEtomidine Infusion 0.1 MICROgram(s)/kG/Hr (2.05 mL/Hr) IV Continuous <Continuous>  gabapentin 100 milliGRAM(s) Oral every 8 hours  heparin   Injectable 5000 Unit(s) SubCutaneous every 8 hours  naloxone Injectable 0.4 milliGRAM(s) IV Push once  pantoprazole  Injectable 40 milliGRAM(s) IV Push every 24 hours  sodium chloride 0.9%. 1000 milliLiter(s) (90 mL/Hr) IV Continuous <Continuous>    MEDICATIONS  (PRN):  fentaNYL    Injectable 25 MICROGram(s) IV Push every 4 hours PRN AGITATION/TACHYPNEA  ondansetron Injectable 4 milliGRAM(s) IV Push every 6 hours PRN Nausea and/or Vomiting  oxyCODONE    IR 2.5 milliGRAM(s) Oral every 4 hours PRN Moderate Pain (4 - 6)  oxyCODONE    IR 5 milliGRAM(s) Oral every 4 hours PRN Severe Pain (7 - 10)      DVT PROPHYLAXIS: heparin   Injectable 5000 Unit(s) SubCutaneous every 8 hours    GI PROPHYLAXIS: pantoprazole  Injectable 40 milliGRAM(s) IV Push every 24 hours    ANTICOAGULATION:   ANTIBIOTICS:  cefiderocol IVPB 1500 milliGRAM(s)      LAB/STUDIES:  Labs:  CAPILLARY BLOOD GLUCOSE      POCT Blood Glucose.: 104 mg/dL (26 Feb 2023 23:31)  POCT Blood Glucose.: 119 mg/dL (26 Feb 2023 17:19)  POCT Blood Glucose.: 124 mg/dL (26 Feb 2023 14:11)  POCT Blood Glucose.: 134 mg/dL (26 Feb 2023 05:44)                          7.6    19.25 )-----------( 511      ( 26 Feb 2023 11:43 )             24.2       Auto Neutrophil %: 85.6 % (02-26-23 @ 11:43)  Auto Immature Granulocyte %: 2.3 % (02-26-23 @ 11:43)    02-25    137  |  101  |  23<H>  ----------------------------<  105<H>  4.6   |  25  |  0.5<L>      LFTs:     Blood Gas Arterial, Lactate: 1.40 mmol/L (02-26-23 @ 03:26)  Blood Gas Arterial, Lactate: 0.90 mmol/L (02-25-23 @ 17:46)  Blood Gas Arterial, Lactate: 0.80 mmol/L (02-25-23 @ 03:18)  Blood Gas Arterial, Lactate: 1.50 mmol/L (02-24-23 @ 04:39)    ABG - ( 26 Feb 2023 03:26 )  pH: 7.54  /  pCO2: 31    /  pO2: 156   / HCO3: 26    / Base Excess: 3.9   /  SaO2: 100.0       ABG - ( 25 Feb 2023 17:46 )  pH: 7.56  /  pCO2: 32    /  pO2: 72    / HCO3: 29    / Base Excess: 6.6   /  SaO2: 96.6        ABG - ( 25 Feb 2023 03:18 )  pH: 7.51  /  pCO2: 36    /  pO2: 115   / HCO3: 29    / Base Excess: 5.3   /  SaO2: 100.0       IMAGING:  < from: Xray Chest 1 View- PORTABLE-Routine (Xray Chest 1 View- PORTABLE-Routine in AM.) (02.26.23 @ 06:50) >  Impression:    Unchanged left-sided opacity/effusion    < end of copied text >

## 2023-02-27 NOTE — PROGRESS NOTE ADULT - ASSESSMENT
Assessment and Plan:   82y female w/ sbo, sigmoid mass w/ pneumoperitoneum      2/21: abd washout, abthera placed   2/19: abdominal washout, no further resection   2/16: Previous cecostomy site noted to be ischemic, omental patch repair, rest of bowel no ischemic changes, bowel edematous, abthera vac in place, abdomen open  2/14: s/p Sigmoid colectomy with ostomy, sigmoid mass resection for SBO/LBO.     NEURO:  #Acute pain    -APAP IV prn    -Fentanyl 25 mcg prn q4  #Acute sedation    -Precedex infusion > held  #Code stroke 2/23   -CTH revealed patchy hypodensities in bilateral thalami and bilateral occipitotemporal regions like representing acute infarct/ischemic injury. No intracranial hemorrhage or midline shift.   -CTA: no LVO/AVM, left vertebral artery stenosis.  - 2nd CTH completed > no significant change in edema involving b/l thalamic and occipital lobes   -EEG: mild- mod generalized slowing, bilateral focal slowing over posterior quadrants with shifting asymmetry, R>L, borderline independent left frontal focal slowing,     RESP:   #Oxygenation  RR (machine): 10, TV (machine): 350, FiO2: 40, PEEP: 5  02-26 @ 03:26--7.54 / 31 / 156 / 26 / 100.0  O2 100.0  Lac 1.40    -ET  20cm  -Daily CXR and ABG while intubated    CARDS:   #hypotension 2/2 hypovolemic shock-resolved  -Prev on levophed, off since 10:30 AM 2/20  -#Imaging/labs  Echo 2/18:  elevated LA pressures, Lv not well visualized suspect normal lv function, suggest lumason, distal anteroseptal hypokinesis, sclerotic Aov  -F/U echo pending  -PVCs--> EKG  -trop neg x 3  - C/s Cardiology Recs 2/23: TTE, complete full stroke workup, reconsult if needed     GI/NUTR:   #s/p Sigmoid colectomy with descending ostomy and resection of sigmoid mass, 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  - pathology -  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  attempts to close lower portion resulted in elevated peak airway pressures. Application of abthera vac dressing  -RTOR 2/21 for abdominal washout and replacement of abthera   -#Diet  -NPO with VHP held  -Off TPN now   #GI Prophylaxis    -Pantoprazole  #Bowel regimen    -HOLDING    /RENAL:   #urine output in critically ill    -indwelling rodriguez (placed 2/14/2023)  #GERA  (baseline Cr 0.8) > resolved     -peak 1.4  #urine output in critically ill  Monitor UO-rodriguez in place  Labs:          BUN/Cr- 27/<0.5  -->,  25/0.5  -->,  23/0.5  -->          Electrolytes-Na 137 // K 4.6 // Mg 2.4 //  Phos 2.7 (02-25 @ 20:34)  Daily BMP with electrolyte repletion  Appreciate strict I/Os    HEME/ONC:   Holding anticoagulation at this time, ASA 81   #DVT ppx: SCDs, HSQ  Hb/Hct:  7.5/24.1  -->,  8.7/27.8  -->,  7.6/24.2  -->  Plts:  434  -->,  607  -->,  511  -->    PTT/INR:   T&S expires:    ID:  WBC- 14.02  --->>,  20.08  --->>,  19.25  --->>  Temp trend- 24hrs T(F): 97.9 (02-26 @ 16:00), Max: 98.9 (02-26 @ 04:00)  Antibiotics-cefiderocol IVPB 1500 every 8 hours    ENDO:    -FSG q6 while NPO    -Glucose goal 140-180    -if above 180 start ISS       LINES/DRAINS:  PIV, Rodriguez, ETT, Abdominal abthera vac, NGT. [facility]J    ADVANCED DIRECTIVES:  Full Code    INDICATION FOR SICU: HEMODYNAMIC MONITORING POSTOPERATIVE REQUIRING VASOPRESSORS AND MECHANICAL VENTILATION     Dispo: SICU. Discussed with covering surgical attending     Assessment and Plan:   82y female w/ sbo, sigmoid mass w/ pneumoperitoneum      2/21: abd washout, abthera placed   2/19: abdominal washout, no further resection   2/16: Previous cecostomy site noted to be ischemic, omental patch repair, rest of bowel no ischemic changes, bowel edematous, abthera vac in place, abdomen open  2/14: s/p Sigmoid colectomy with ostomy, sigmoid mass resection for SBO/LBO.     NEURO:  #Acute pain    -APAP IV prn    -Fentanyl 25 mcg prn q4  #Acute sedation    -Precedex infusion > held  #Code stroke 2/23   -CTH revealed patchy hypodensities in bilateral thalami and bilateral occipitotemporal regions like representing acute infarct/ischemic injury. No intracranial hemorrhage or midline shift.   -CTA: no LVO/AVM, left vertebral artery stenosis.  - 2nd CTH completed > no significant change in edema involving b/l thalamic and occipital lobes   -EEG: mild- mod generalized slowing, bilateral focal slowing over posterior quadrants with shifting asymmetry, R>L, borderline independent left frontal focal slowing,     RESP:   #Oxygenation  RR (machine): 10, TV (machine): 350, FiO2: 40, PEEP: 5  02-26 @ 03:26--7.54 / 31 / 156 / 26 / 100.0  O2 100.0  Lac 1.40    02/27: 7.50/32/189/25  -ET  20cm  -Daily CXR and ABG while intubated    CARDS:   #hypotension 2/2 hypovolemic shock-resolved  -Prev on levophed, off since 10:30 AM 2/20  -#Imaging/labs  Echo 2/18:  elevated LA pressures, Lv not well visualized suspect normal lv function, suggest lumason, distal anteroseptal hypokinesis, sclerotic Aov  -F/U echo pending  -PVCs--> EKG  -trop neg x 3  - C/s Cardiology Recs 2/23: TTE, complete full stroke workup, reconsult if needed     GI/NUTR:   #s/p Sigmoid colectomy with descending ostomy and resection of sigmoid mass, 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  - pathology -  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  attempts to close lower portion resulted in elevated peak airway pressures. Application of abthera vac dressing  -RTOR 2/21 for abdominal washout and replacement of abthera   -#Diet  -NPO with VHP held  -Off TPN now   #GI Prophylaxis    -Pantoprazole  #Bowel regimen    -HOLDING    /RENAL:   #urine output in critically ill    -indwelling rodriguez (placed 2/14/2023)  #GERA  (baseline Cr 0.8) > resolved     -peak 1.4  #urine output in critically ill  Monitor UO-rodriguez in place    Labs:          BUN/Cr- 23/0.5  -->,  25/0.5  -->          Electrolytes-Na 141 // K 3.0 // Mg 1.9 //  Phos 2.1 (02-27 @ 06:00)  Daily BMP with electrolyte repletion  Appreciate strict I/Os    HEME/ONC:   Holding anticoagulation at this time, ASA 81   #DVT ppx: SCDs, HSQ    Labs: Hb/Hct:  7.6/24.2  -->,  7.6/24.9  -->                      Plts:  511  -->,  539  -->                 PTT/INR:  19.6/0.94  --->     T&S expires:    ID:  WBC- 20.08  --->>,  19.25  --->>,  17.20  --->>  Temp trend- 24hrs T(F): 97.5 (02-27 @ 04:00), Max: 98.4 (02-26 @ 12:00)  Antibiotics-cefiderocol IVPB 1500 every 8 hours    ENDO:    -FSG q6 while NPO    -Glucose goal 140-180    -if above 180 start ISS       LINES/DRAINS:  PIV, Rodriguez, ETT, Abdominal abthera vac, NGT. ROBERT MELÉNDEZ midline    ADVANCED DIRECTIVES:  Full Code    INDICATION FOR SICU: HEMODYNAMIC MONITORING POSTOPERATIVE REQUIRING VASOPRESSORS AND MECHANICAL VENTILATION     Dispo: SICU. Discussed with covering surgical attending

## 2023-02-27 NOTE — CHART NOTE - NSCHARTNOTEFT_GEN_A_CORE
NUTRITION SUPPORT TEAM  -  PROGRESS NOTE     Interval Events:    Vent dependent, off sedation  Unresponsive  TPN off 2/24, TF's well tolerating but now on hold  Colostomy functioning. +Abd vac       REVIEW OF SYSTEMS:  Negative except as noted above.     VITALS:  T(F): 98 (02-27 @ 16:00), Max: 98 (02-27 @ 16:00)  HR: 88 (02-27 @ 18:00) (88 - 93)  BP: 121/56 (02-27 @ 18:00) (121/56 - 139/63)  RR: 26 (02-27 @ 18:00) (24 - 26)  SpO2: 98% (02-27 @ 18:00) (97% - 99%)    HEIGHT/WEIGHT/BMI:   Height (cm): 152.4 (02-21)  Weight (kg): 82.1 (02-21)  BMI (kg/m2): 35.3 (02-21)    I/Os:     02-26-23 @ 07:01  -  02-27-23 @ 07:00  --------------------------------------------------------  IN:    Dexmedetomidine: 41 mL    sodium chloride 0.9%: 1170 mL    sodium chloride 0.9%: 450 mL    Sodium Chloride 0.9% Bolus: 1500 mL    Vital High Protein: 680 mL  Total IN: 3841 mL    OUT:    Colostomy (mL): 250 mL    Indwelling Catheter - Urethral (mL): 1160 mL    VAC (Vacuum Assisted Closure) System (mL): 425 mL  Total OUT: 1835 mL    Total NET: 2006 mL    MEDICATIONS:   acetaminophen     Tablet .. 1000 milliGRAM(s) Oral every 8 hours  amLODIPine   Tablet 5 milliGRAM(s) Oral daily  aspirin  chewable 81 milliGRAM(s) Oral daily  atorvastatin 80 milliGRAM(s) Oral at bedtime  cefiderocol IVPB 1500 milliGRAM(s) IV Intermittent every 8 hours  chlorhexidine 0.12% Liquid 15 milliLiter(s) Oral Mucosa every 12 hours  chlorhexidine 2% Cloths 1 Application(s) Topical <User Schedule>  dexMEDEtomidine Infusion 0.1 MICROgram(s)/kG/Hr (2.05 mL/Hr) IV Continuous <Continuous>  dextrose 5% + sodium chloride 0.45%. 1000 milliLiter(s) (90 mL/Hr) IV Continuous <Continuous>  fentaNYL    Injectable 25 MICROGram(s) IV Push every 4 hours PRN  gabapentin 100 milliGRAM(s) Oral every 8 hours  heparin   Injectable 5000 Unit(s) SubCutaneous every 8 hours  naloxone Injectable 0.4 milliGRAM(s) IV Push once  ondansetron Injectable 4 milliGRAM(s) IV Push every 6 hours PRN  oxyCODONE    IR 2.5 milliGRAM(s) Oral every 4 hours PRN  oxyCODONE    IR 5 milliGRAM(s) Oral every 4 hours PRN  pantoprazole  Injectable 40 milliGRAM(s) IV Push every 24 hours    LABS:                         7.6    17.20 )-----------( 539      ( 27 Feb 2023 06:00 )             24.9     141  |  111<H>  |  25<H>  ----------------------------<  111<H>          (02-27-23 @ 06:00)  3.0<L>   |  21  |  0.5<L>    Ca    6.4<L>          (02-27-23 @ 06:00)  Phos  2.1         (02-27-23 @ 06:00)  Mg     1.9         (02-27-23 @ 06:00)    TPro  4.0<L>  /  Alb  1.8<L>  /  TBili  0.2  /  DBili  <0.2  /  AST  29  /  ALT  31  /  AlkPhos  191<H>       02-27-23 @ 06:00    Triglycerides, Serum: 196 mg/dL (02-17 @ 09:21)    Blood Glucose (Past 24 hours):  104 mg/dL (02-27 @ 19:18)  103 mg/dL (02-27 @ 13:45)  123 mg/dL (02-27 @ 06:42)  104 mg/dL (02-26 @ 23:31)    DIET:   Diet, NPO:   Except Medications (02-27-23 @ 18:30) [Active]    ASSESSMENT  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,4,5 digit numbness) on aspirin and no other antiplatelet/anticoagulant agents,  GERD, HTN, HLD, chronic UTIs, pasty cholecystectomy(>20yrs ago), hysterectomy (>45yrs ago), and bladder lift (>30yrs ago) presented to the ED with SBO s/p diagnostic lap converted to exploratory laparotomy, sigmoidectomy, cecal enterotomy w/ stool expression and closure, descending colostomy, and abthera placement    - NPO since admission  - obesity, BMI 35.3  - at high risk for malnutrition and surgical complications in absence of adequate nutrition support  - s/p code stroke with CTH showing acute infarct/ischemic injury      PLAN  - when feeds resumed give Peptamen Intense VHP at 40ml/hr   - monitor lytes  - monitor ostomy output  - will follow

## 2023-02-27 NOTE — PROGRESS NOTE ADULT - ATTENDING COMMENTS
Resp- remains on mechanical ventialtion, mild settings. Cardiac- not on vasopressors. Renal- good urine output, cr stable. Sedation and pain control as needed. Neuro- not following commands. we had a meeting with Nadia Perrin from neurology with the family, he explained the outcomes. we will proceed with mesh placement today/tomorrow. family to discuss whether they would want more procedures in the next few days.

## 2023-02-27 NOTE — PROGRESS NOTE ADULT - SUBJECTIVE AND OBJECTIVE BOX
ELLEN, RUBIO  82y, Female    All available historical data reviewed    OVERNIGHT EVENTS:  non responsive off sedation  fio2 40%  no fevers    ROS:  unable to obtain history secondary to patient's mental status     VITALS:  T(F): 97.5, Max: 98.4 (02-26-23 @ 12:00)  HR: 92  BP: 138/60  RR: 24Vital Signs Last 24 Hrs  T(C): 36.4 (27 Feb 2023 04:00), Max: 36.9 (26 Feb 2023 12:00)  T(F): 97.5 (27 Feb 2023 04:00), Max: 98.4 (26 Feb 2023 12:00)  HR: 92 (27 Feb 2023 09:00) (57 - 94)  BP: 138/60 (27 Feb 2023 09:00) (86/45 - 142/61)  BP(mean): 87 (27 Feb 2023 09:00) (63 - 102)  RR: 24 (27 Feb 2023 09:00) (17 - 27)  SpO2: 97% (27 Feb 2023 09:00) (91% - 100%)    Parameters below as of 27 Feb 2023 04:00  Patient On (Oxygen Delivery Method): ventilator    O2 Concentration (%): 40    TESTS & MEASUREMENTS:                        7.6    17.20 )-----------( 539      ( 27 Feb 2023 06:00 )             24.9     02-27    141  |  111<H>  |  25<H>  ----------------------------<  111<H>  3.0<L>   |  21  |  0.5<L>    Ca    6.4<L>      27 Feb 2023 06:00  Phos  2.1     02-27  Mg     1.9     02-27    TPro  4.0<L>  /  Alb  1.8<L>  /  TBili  0.2  /  DBili  <0.2  /  AST  29  /  ALT  31  /  AlkPhos  191<H>  02-27    LIVER FUNCTIONS - ( 27 Feb 2023 06:00 )  Alb: 1.8 g/dL / Pro: 4.0 g/dL / ALK PHOS: 191 U/L / ALT: 31 U/L / AST: 29 U/L / GGT: x                   RADIOLOGY & ADDITIONAL TESTS:  Personal review of radiological diagnostics performed  Echo and EKG results noted when applicable.     MEDICATIONS:  acetaminophen     Tablet .. 1000 milliGRAM(s) Oral every 8 hours  amLODIPine   Tablet 5 milliGRAM(s) Oral daily  aspirin  chewable 81 milliGRAM(s) Oral daily  atorvastatin 80 milliGRAM(s) Oral at bedtime  cefiderocol IVPB 1500 milliGRAM(s) IV Intermittent every 8 hours  chlorhexidine 0.12% Liquid 15 milliLiter(s) Oral Mucosa every 12 hours  chlorhexidine 2% Cloths 1 Application(s) Topical <User Schedule>  dexMEDEtomidine Infusion 0.1 MICROgram(s)/kG/Hr IV Continuous <Continuous>  dextrose 5% + sodium chloride 0.45%. 1000 milliLiter(s) IV Continuous <Continuous>  fentaNYL    Injectable 25 MICROGram(s) IV Push every 4 hours PRN  gabapentin 100 milliGRAM(s) Oral every 8 hours  heparin   Injectable 5000 Unit(s) SubCutaneous every 8 hours  naloxone Injectable 0.4 milliGRAM(s) IV Push once  ondansetron Injectable 4 milliGRAM(s) IV Push every 6 hours PRN  oxyCODONE    IR 2.5 milliGRAM(s) Oral every 4 hours PRN  oxyCODONE    IR 5 milliGRAM(s) Oral every 4 hours PRN  pantoprazole  Injectable 40 milliGRAM(s) IV Push every 24 hours  potassium chloride  20 mEq/100 mL IVPB 20 milliEquivalent(s) IV Intermittent every 2 hours  potassium phosphate IVPB 15 milliMole(s) IV Intermittent once      ANTIBIOTICS:  cefiderocol IVPB 1500 milliGRAM(s) IV Intermittent every 8 hours

## 2023-02-27 NOTE — PROGRESS NOTE ADULT - ASSESSMENT
· Assessment	  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 SBO. No pneumoperitoneum.  Hosp course : SBO s/p diagnostic lap converted to exploratory laparotomy, sigmoidectomy, cecal enterotomy w/ stool expression and closure, descending colostomy, and abthera placement.    2/23 CT Brain Stroke Protocol : bilateral thalami and bilateral occipitotemporal with acute infarct/ischemic injury. No intracranial   hemorrhage or midline shift.        IMPRESSION;  Non responsive off sedation  Neuro notes read  2/24 EEG : generalized slowing, no Sz activity  Very poor prognosis  Bacterial PNA secondary to MDR Klebsiella  CXR : no new consolidation  2/15 BAL Klebsiella  WBC 26.7>17.2    RECOMMENDATIONS;  No change in ABx  monitor WBC  Fetroja 2 gm iv q8h for a total of 14 days since 2/19  Off loading to prevent pressure sores and preventive measures to avoid aspiration   Please do not hesitate to recall ID if any questions arise either through [email or web address] or through [employer]

## 2023-02-27 NOTE — PROGRESS NOTE ADULT - SUBJECTIVE AND OBJECTIVE BOX
RUBIO HUGHES  [id number]  82y Female    Indication for ICU admission: open abdomen s/p bowel resection for SBO  Admit Date: 2/9  ICU Date:  2/14  OR Date: 2/14, 2/16    No Known Allergies    PAST MEDICAL & SURGICAL HISTORY:  Atrial fibrillation  Hypertension  CVA (cerebrovascular accident)  HLD (hyperlipidemia)  GERD (gastroesophageal reflux disease)  History of cholecystectomy  History of hysterectomy  History of bladder suspension procedure      24HRS EVENT:  2/26  NIGHT  --no blood return from all 3 central line ports   -ET tube @20, NGT @60  -not following commands, responds to noxious stimuli  -abthera secured with tegaderm     DAY  - Thick secretions from ETT  - SAT/SBT  - F/U with Neurology regarding Brain MRI urgency  - U/O dropped to 15 mL/hr- 250 mL bouls x 1 given--> urine improbed to 25 mL/hr    REVIEW OF SYSTEMS  [ ] A ten-point review of systems was otherwise negative except as noted.  [ x] Due to altered mental status/intubation, subjective information were not able to be obtained from the patient. History was obtained, to the extent possible, from review of the chart and collateral sources of information  ********************************************************************************************************   RUBIO HUGHES  [id number]  82y Female    Indication for ICU admission: open abdomen s/p bowel resection for SBO  Admit Date: 2/9  ICU Date:  2/14  OR Date: 2/14, 2/16    No Known Allergies    PAST MEDICAL & SURGICAL HISTORY:  Atrial fibrillation  Hypertension  CVA (cerebrovascular accident)  HLD (hyperlipidemia)  GERD (gastroesophageal reflux disease)  History of cholecystectomy  History of hysterectomy  History of bladder suspension procedure      24HRS EVENT:  2/26  NIGHT  --no blood return from all 3 central line ports   -ET tube @20, NGT @60  -not following commands, responds to noxious stimuli  -abthera secured with tegaderm     DAY  - Thick secretions from ETT  - SAT/SBT  - F/U with Neurology regarding Brain MRI urgency  - U/O dropped to 15 mL/hr- 250 mL bouls x 1 given--> urine improbed to 25 mL/hr    REVIEW OF SYSTEMS  [ ] A ten-point review of systems was otherwise negative except as noted.  [ x] Due to altered mental status/intubation, subjective information were not able to be obtained from the patient. History was obtained, to the extent possible, from review of the chart and collateral sources of information  ********************************************************************************************************  Daily       Diet, NPO with Tube Feed:   Tube Feeding Modality: Nasogastric  Peptamen Intense VHP  Total Volume for 24 Hours (mL): 960  Continuous  Starting Tube Feed Rate mL per Hour: 30  Increase Tube Feed Rate by (mL): 10     Every 4 hours  Until Goal Tube Feed Rate (mL per Hour): 40  Tube Feed Duration (in Hours): 24  Tube Feed Start Time: 12:53 (02-26-23 @ 16:14)      CURRENT MEDS:  Neurologic Medications  acetaminophen     Tablet .. 1000 milliGRAM(s) Oral every 8 hours  dexMEDEtomidine Infusion 0.1 MICROgram(s)/kG/Hr IV Continuous <Continuous>  fentaNYL    Injectable 25 MICROGram(s) IV Push every 4 hours PRN AGITATION/TACHYPNEA  gabapentin 100 milliGRAM(s) Oral every 8 hours  ondansetron Injectable 4 milliGRAM(s) IV Push every 6 hours PRN Nausea and/or Vomiting  oxyCODONE    IR 2.5 milliGRAM(s) Oral every 4 hours PRN Moderate Pain (4 - 6)  oxyCODONE    IR 5 milliGRAM(s) Oral every 4 hours PRN Severe Pain (7 - 10)    Respiratory Medications    Cardiovascular Medications  amLODIPine   Tablet 5 milliGRAM(s) Oral daily    Gastrointestinal Medications  pantoprazole  Injectable 40 milliGRAM(s) IV Push every 24 hours  potassium chloride  20 mEq/100 mL IVPB 20 milliEquivalent(s) IV Intermittent every 2 hours  potassium phosphate IVPB 15 milliMole(s) IV Intermittent once  sodium chloride 0.9%. 1000 milliLiter(s) IV Continuous <Continuous>    Genitourinary Medications    Hematologic/Oncologic Medications  aspirin  chewable 81 milliGRAM(s) Oral daily  heparin   Injectable 5000 Unit(s) SubCutaneous every 8 hours    Antimicrobial/Immunologic Medications  cefiderocol IVPB 1500 milliGRAM(s) IV Intermittent every 8 hours    Endocrine/Metabolic Medications  atorvastatin 80 milliGRAM(s) Oral at bedtime    Topical/Other Medications  benzocaine 20% Spray 1 Spray(s) Topical once  chlorhexidine 0.12% Liquid 15 milliLiter(s) Oral Mucosa every 12 hours  chlorhexidine 2% Cloths 1 Application(s) Topical <User Schedule>  naloxone Injectable 0.4 milliGRAM(s) IV Push once      ICU Vital Signs Last 24 Hrs  T(C): 36.4 (27 Feb 2023 04:00), Max: 36.9 (26 Feb 2023 12:00)  T(F): 97.5 (27 Feb 2023 04:00), Max: 98.4 (26 Feb 2023 12:00)  HR: 90 (27 Feb 2023 04:00) (57 - 92)  BP: 142/60 (27 Feb 2023 05:00) (86/45 - 142/61)  BP(mean): 87 (27 Feb 2023 05:00) (63 - 89)  ABP: --  ABP(mean): --  RR: 25 (27 Feb 2023 07:00) (17 - 31)  SpO2: 95% (27 Feb 2023 07:00) (91% - 100%)    O2 Parameters below as of 27 Feb 2023 04:00  Patient On (Oxygen Delivery Method): ventilator    O2 Concentration (%): 40        Adult Advanced Hemodynamics Last 24 Hrs  CVP(mm Hg): --  CVP(cm H2O): --  CO: --  CI: --  PA: --  PA(mean): --  PCWP: --  SVR: --  SVRI: --  PVR: --  PVRI: --    Mode: AC/ CMV (Assist Control/ Continuous Mandatory Ventilation)  RR (machine): 10  TV (machine): 350  FiO2: 40  PEEP: 5  ITime: 1  MAP: 8  PIP: 17    ABG - ( 27 Feb 2023 03:41 )  pH, Arterial: 7.50  pH, Blood: x     /  pCO2: 32    /  pO2: 189   / HCO3: 25    / Base Excess: 1.8   /  SaO2: 100.0       I&O's Summary    26 Feb 2023 07:01  -  27 Feb 2023 07:00  --------------------------------------------------------  IN: 3841 mL / OUT: 1835 mL / NET: 2006 mL      I&O's Detail    26 Feb 2023 07:01  -  27 Feb 2023 07:00  --------------------------------------------------------  IN:    Dexmedetomidine: 41 mL    sodium chloride 0.9%: 450 mL    sodium chloride 0.9%: 1170 mL    Sodium Chloride 0.9% Bolus: 1500 mL    Vital High Protein: 680 mL  Total IN: 3841 mL    OUT:    Colostomy (mL): 250 mL    Indwelling Catheter - Urethral (mL): 1160 mL    VAC (Vacuum Assisted Closure) System (mL): 425 mL  Total OUT: 1835 mL    Total NET: 2006 mL          PHYSICAL EXAM:   Pupils equal, reactive to light   moving LUE and LLE  no acute distress  equal chest rise b/l  abdomen soft, abthera vac in place  extremities soft  urinary cath in place  pitting edema bilateral lower extremities

## 2023-02-27 NOTE — PROGRESS NOTE ADULT - SUBJECTIVE AND OBJECTIVE BOX
Neurology Follow up note    Name  RUBIO HUGHES    HPI:  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 She has had nausea and emesis which she described as bilious associated with the pain. She has had no bowel movements for the past three days but states that she has had flatus as soon as today.  She reports having diarrhea five days ago. Patient denied hematochezia, hematemesis, chest pain, cough change from chronic baseline, fever, sick contacts, nor urinary symptoms. (10 Feb 2023 02:21)      Interval History - Patient intubated off sedation.  Son and sister at bedside          Vital Signs Last 24 Hrs  T(C): 36.9 (28 Feb 2023 06:00), Max: 37.2 (27 Feb 2023 20:00)  T(F): 98.5 (28 Feb 2023 06:00), Max: 99 (27 Feb 2023 20:00)  HR: 94 (28 Feb 2023 08:00) (79 - 103)  BP: 164/76 (28 Feb 2023 08:00) (121/56 - 164/76)  BP(mean): 109 (28 Feb 2023 08:00) (81 - 109)  RR: 20 (28 Feb 2023 08:00) (20 - 33)  SpO2: 100% (28 Feb 2023 08:00) (97% - 100%)    Parameters below as of 28 Feb 2023 08:00  Patient On (Oxygen Delivery Method): ventilator    O2 Concentration (%): 40  ICU Vital Signs Last 24 Hrs  T(C): 36.9 (28 Feb 2023 06:00), Max: 37.2 (27 Feb 2023 20:00)  T(F): 98.5 (28 Feb 2023 06:00), Max: 99 (27 Feb 2023 20:00)  HR: 94 (28 Feb 2023 08:00) (79 - 103)  BP: 164/76 (28 Feb 2023 08:00) (121/56 - 164/76)  BP(mean): 109 (28 Feb 2023 08:00) (81 - 109)  ABP: --  ABP(mean): --  RR: 20 (28 Feb 2023 08:00) (20 - 33)  SpO2: 100% (28 Feb 2023 08:00) (97% - 100%)    O2 Parameters below as of 28 Feb 2023 08:00  Patient On (Oxygen Delivery Method): ventilator    O2 Concentration (%): 40            Neurological Exam:   PERRL, Turns head to verbal command, NO BTT, Grimaces to noxious stimuli        Medications  acetaminophen     Tablet .. 1000 milliGRAM(s) Oral every 8 hours  amLODIPine   Tablet 5 milliGRAM(s) Oral daily  aspirin  chewable 81 milliGRAM(s) Oral daily  atorvastatin 80 milliGRAM(s) Oral at bedtime  cefiderocol IVPB 1500 milliGRAM(s) IV Intermittent every 8 hours  chlorhexidine 0.12% Liquid 15 milliLiter(s) Oral Mucosa every 12 hours  chlorhexidine 2% Cloths 1 Application(s) Topical <User Schedule>  dexMEDEtomidine Infusion 0.1 MICROgram(s)/kG/Hr IV Continuous <Continuous>  dextrose 5% + sodium chloride 0.45%. 1000 milliLiter(s) IV Continuous <Continuous>  fentaNYL    Injectable 25 MICROGram(s) IV Push every 4 hours PRN  gabapentin 100 milliGRAM(s) Oral every 8 hours  heparin   Injectable 5000 Unit(s) SubCutaneous every 8 hours  magnesium sulfate  IVPB 1 Gram(s) IV Intermittent once  midazolam Injectable 2 milliGRAM(s) IV Push once  naloxone Injectable 0.4 milliGRAM(s) IV Push once  ondansetron Injectable 4 milliGRAM(s) IV Push every 6 hours PRN  oxyCODONE    IR 2.5 milliGRAM(s) Oral every 4 hours PRN  oxyCODONE    IR 5 milliGRAM(s) Oral every 4 hours PRN  pantoprazole  Injectable 40 milliGRAM(s) IV Push every 24 hours  potassium phosphate IVPB 15 milliMole(s) IV Intermittent once      Lab                        7.4    18.63 )-----------( 571      ( 28 Feb 2023 05:32 )             24.3     02-28    141  |  109  |  16  ----------------------------<  116<H>  3.9   |  25  |  0.5<L>    Ca    7.0<L>      28 Feb 2023 04:55  Phos  2.2     02-28  Mg     1.9     02-28    TPro  4.0<L>  /  Alb  1.8<L>  /  TBili  0.2  /  DBili  <0.2  /  AST  29  /  ALT  31  /  AlkPhos  191<H>  02-27    CAPILLARY BLOOD GLUCOSE      POCT Blood Glucose.: 111 mg/dL (28 Feb 2023 06:19)  POCT Blood Glucose.: 117 mg/dL (28 Feb 2023 00:57)  POCT Blood Glucose.: 104 mg/dL (27 Feb 2023 19:18)  POCT Blood Glucose.: 103 mg/dL (27 Feb 2023 13:45)    LIVER FUNCTIONS - ( 27 Feb 2023 06:00 )  Alb: 1.8 g/dL / Pro: 4.0 g/dL / ALK PHOS: 191 U/L / ALT: 31 U/L / AST: 29 U/L / GGT: x           PT/INR - ( 27 Feb 2023 06:00 )   PT: 10.70 sec;   INR: 0.94 ratio         PTT - ( 27 Feb 2023 06:00 )  PTT:19.6 sec    Radiology  < from: CT Head No Cont (02.24.23 @ 00:06) >  INTERPRETATION:  Clinical History / Reason for exam: Stroke code   follow-up.    Technique: Noncontrast head CT.  Contiguous unenhanced CT axial images of   the head from the base to the vertex with coronal and sagittal reformats.    Comparison: CT head performed earlier the same day 9:24 AM.    Findings:    There is streak artifact from multiple electrodes overlying the head.    The ventricles and cortical sulci are stable with no evidence of   hydrocephalus.    There is no significant interval change in the edema and expansion   involving bilateral occipital lobes and thalami.    Typical posterior fossa streak artifact is noted.    No acuteintracranial hemorrhage or midline shift is demonstrated.    Vascular calcifications are noted.    Endotracheal and nasogastric tubes are present.    Polypoid opacities are seen within the right maxillary and sphenoid   sinuses. Partial opacificationof the middle ear and mastoid cavities   noted.    IMPRESSION:    No significant interval change in the edema involving bilateral thalami   and occipital lobes.    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 S/p Sigmoid colectomy with ostomy, sigmoid mass resection for SBO/LBO 7 days ago and now on sedation vacation patient not responding. Last known well unclear. Abdomen remains open. Stroke code called. Out of the window for IV thrombolytics. Patient with no LVO therefore not a candidate for IA intervention. CTH revealed Patchy hypodensities in bilateral thalami and bilateral occipitotemporal regions like representing acute infarct/ischemic injury. Etiology unclear at this time, differentials include embolic or flow related ischemic strokes, less likely infectious or metastatic etiology. vEEG did not show any seizures, but transients of sharp waves.  Had family meeting today and discussed prognosis and answered all family members questions    Plan  1. Prognosis poor  2. When stable MRI brain w/o DILIP (assess for brainstem involvement which would klever a poorer prognosis)  3. Will follow periodically for neuroexam and MRI

## 2023-02-27 NOTE — PROGRESS NOTE ADULT - ASSESSMENT
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 SBO. No pneumoperitoneum.  Hosp course : SBO s/p diagnostic lap converted to exploratory laparotomy, sigmoidectomy, cecal enterotomy w/ stool expression and closure, descending colostomy, and abthera placement.    2/23 CT Brain Stroke Protocol : bilateral thalami and bilateral occipitotemporal with acute infarct/ischemic injury. No intracranial   hemorrhage or midline shift.    IMPRESSION;  Encephalopathy > non responsive off sedation  Neuro recs appreciated  2/24 EEG : generalized slowing, no Sz activity  Very poor prognosis  Bacterial PNA secondary to MDR Klebsiella  CXR : no new consolidation  2/15 BAL Klebsiella  WBC 26.7    RECOMMENDATIONS;  No change in ABx  monitor WBC  Fetroja 2 gm iv q8h  Off loading to prevent pressure sores and preventive measures to avoid aspiration     Trauma/ACS  Spectra [id number]

## 2023-02-28 NOTE — PROGRESS NOTE ADULT - ASSESSMENT
Assessment and Plan:   82y female w/ sbo, sigmoid mass w/ pneumoperitoneum    2/21: abd washout, abthera placed   2/19: abdominal washout, no further resection   2/16: Previous cecostomy site noted to be ischemic, omental patch repair, rest of bowel no ischemic changes, bowel edematous, abthera vac in place, abdomen open  2/14: s/p Sigmoid colectomy with ostomy, sigmoid mass resection for SBO/LBO.     NEURO:  #Acute pain    -APAP IV prn    -Fentanyl 25 mcg prn q4  #Acute sedation    - not on any sedation currently  #Code stroke 2/23   -CTH revealed patchy hypodensities in bilateral thalami and bilateral occipitotemporal regions like representing acute infarct/ischemic injury. No intracranial hemorrhage or midline shift.   -CTA: no LVO/AVM, left vertebral artery stenosis.  - 2nd CTH completed > no significant change in edema involving b/l thalamic and occipital lobes   -EEG: mild- mod generalized slowing, bilateral focal slowing over posterior quadrants with shifting asymmetry, R>L, borderline independent left frontal focal slowing,   - MRI 2/27: follow up read    RESP:   #Oxygenation  RR (machine): 10, TV (machine): 320, FiO2: 40, PEEP: 5  02-27 @ 11:42--7.46 / 36 / 91 / 26 / 99.4  O2 99.4  Lac 0.40    -ET  22cm  -Daily CXR and ABG while intubated  - SAT/SBT per protocol    CARDS:   #hypotension 2/2 hypovolemic shock-resolved  -Prev on levophed, off since 2/20  -#Imaging/labs  Echo 2/18:  elevated LA pressures, Lv not well visualized suspect normal lv function, suggest lumason, distal anteroseptal hypokinesis, sclerotic Aov  -Echo 2/27: LVEF 50-55%. Multiple regional wall motion abnormalities.   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  attempts to close lower portion resulted in elevated peak airway pressures. Application of abthera vac dressing  -RTOR 2/21 for abdominal washout and replacement of abthera   -#Diet  -NPO with VHP held in anticipation for the OR  -Off TPN now   #GI Prophylaxis    -Pantoprazole  #Bowel regimen    -HOLDING    /RENAL:   #urine output in critically ill    -indwelling rodriguez (placed 2/14/2023)  #GERA  (baseline Cr 0.8) > resolved     -peak 1.4  #urine output in critically ill  Monitor UO-rodriguez in place  Volume Status:  02-26-23 @ 07:01  -  02-27-23 @ 07:00  --------------------------------------------------------  IN: 3841 mL / OUT: 1835 mL / NET: 2006 mL    02-27-23 @ 07:01  -  02-28-23 @ 04:51  --------------------------------------------------------  IN: 2514.9 mL / OUT: 1600 mL / NET: 914.9 mL  Labs:          BUN/Cr- 25/0.5  -->          Electrolytes-Na 141 // K 3.0 // Mg 1.9 //  Phos 2.1 (02-27 @ 06:00)  Daily BMP with electrolyte repletion  Appreciate strict I/Os    HEME/ONC:   Holding anticoagulation at this time, ASA 81   #DVT ppx: SCDs, HSQ  Hb/Hct:  8.7/27.8  -->,  7.6/24.2  -->,  7.6/24.9  -->  Plts:  607  -->,  511  -->,  539  -->    PTT/INR: 19.6/0.94 (02-27)  T&S:ABO RH Interpretation:  (02-27)  T&S expires: 3/2    ID:  WBC- 20.08  --->>,  19.25  --->>,  17.20  --->>  Temp trend- 24hrs T(F): 98.3 (02-28 @ 03:00), Max: 99 (02-27 @ 20:00)  Antibiotics-cefiderocol IVPB 1500 every 8 hours  ID following: Abx for a total of 14 days since 2/19    ENDO:    -FSG q6 while NPO    -Glucose goal 140-180    -if above 180 start ISS       LINES/DRAINS:  PIV, Rodriguez, ETT, Abdominal abthera vac, NGT. RIKA, BROOKE midline    ADVANCED DIRECTIVES:  Full Code    INDICATION FOR SICU: HEMODYNAMIC MONITORING POSTOPERATIVE REQUIRING VASOPRESSORS AND MECHANICAL VENTILATION     Dispo: SICU. Discussed with covering surgical attending     Assessment and Plan:   82y female w/ sbo, sigmoid mass w/ pneumoperitoneum    2/21: abd washout, abthera placed   2/19: abdominal washout, no further resection   2/16: Previous cecostomy site noted to be ischemic, omental patch repair, rest of bowel no ischemic changes, bowel edematous, abthera vac in place, abdomen open  2/14: s/p Sigmoid colectomy with ostomy, sigmoid mass resection for SBO/LBO.     NEURO:  #Acute pain    -APAP IV prn    -Fentanyl 25 mcg prn q4  #Acute sedation    - not on any sedation currently  #Code stroke 2/23   -CTH revealed patchy hypodensities in bilateral thalami and bilateral occipitotemporal regions like representing acute infarct/ischemic injury. No intracranial hemorrhage or midline shift.   -CTA: no LVO/AVM, left vertebral artery stenosis.  - 2nd CTH completed > no significant change in edema involving b/l thalamic and occipital lobes   -EEG: mild- mod generalized slowing, bilateral focal slowing over posterior quadrants with shifting asymmetry, R>L, borderline independent left frontal focal slowing,   - MRI 2/27: follow up read    RESP:   #Oxygenation  RR (machine): 10, TV (machine): 320, FiO2: 40, PEEP: 5  02-27 @ 11:42--7.46 / 36 / 91 / 26 / 99.4  O2 99.4  Lac 0.40    -ET  22cm  -Daily CXR and ABG while intubated  - SAT/SBT per protocol    CARDS:   #hypotension 2/2 hypovolemic shock-resolved  -Prev on levophed, off since 2/20  -#Imaging/labs  Echo 2/18:  elevated LA pressures, Lv not well visualized suspect normal lv function, suggest lumason, distal anteroseptal hypokinesis, sclerotic Aov  -Echo 2/27: LVEF 50-55%. Multiple regional wall motion abnormalities. 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  attempts to close lower portion resulted in elevated peak airway pressures. Application of abthera vac dressing  -RTOR 2/21 for abdominal washout and replacement of abthera   -#Diet  -NPO with VHP held in anticipation for the OR  -Off TPN now   #GI Prophylaxis    -Pantoprazole  #Bowel regimen    -HOLDING    /RENAL:   #urine output in critically ill    -indwelling rodriguez (placed 2/14/2023)  #GERA  (baseline Cr 0.8) > resolved     -peak 1.4  #urine output in critically ill  Monitor UO-rodriguez in place  Volume Status:  02-26-23 @ 07:01  -  02-27-23 @ 07:00  --------------------------------------------------------  IN: 3841 mL / OUT: 1835 mL / NET: 2006 mL    02-27-23 @ 07:01  -  02-28-23 @ 04:51  --------------------------------------------------------  IN: 2514.9 mL / OUT: 1600 mL / NET: 914.9 mL  Labs:          BUN/Cr- 25/0.5  -->          Electrolytes-Na 141 // K 3.0 // Mg 1.9 //  Phos 2.1 (02-27 @ 06:00)  Daily BMP with electrolyte repletion  Appreciate strict I/Os    HEME/ONC:   Holding anticoagulation at this time, ASA 81   #DVT ppx: SCDs, HSQ  Hb/Hct:  8.7/27.8  -->,  7.6/24.2  -->,  7.6/24.9  -->  Plts:  607  -->,  511  -->,  539  -->    PTT/INR: 19.6/0.94 (02-27)  T&S:ABO RH Interpretation:  (02-27)  T&S expires: 3/2    ID:  WBC- 20.08  --->>,  19.25  --->>,  17.20  --->>  Temp trend- 24hrs T(F): 98.3 (02-28 @ 03:00), Max: 99 (02-27 @ 20:00)  Antibiotics-cefiderocol IVPB 1500 every 8 hours  ID following: Abx for a total of 14 days since 2/19    ENDO:    -FSG q6 while NPO    -Glucose goal 140-180    -if above 180 start ISS       LINES/DRAINS:  PIV, Rodriguez, ETT, Abdominal abthera vac, NGT. LIJ, LUE midline    ADVANCED DIRECTIVES:  Full Code    INDICATION FOR SICU: HEMODYNAMIC MONITORING POSTOPERATIVE REQUIRING VASOPRESSORS AND MECHANICAL VENTILATION     Dispo: SICU. Discussed with covering surgical attending

## 2023-02-28 NOTE — PROCEDURE NOTE - NSBRONCHHISTORY_GEN_A_CORE_FT
This is a 82y Female s/p below  2/21: abd washout, abthera placed   2/19: abdominal washout, no further resection   2/16: Previous cecetomy site noted to be ischemic, omental patch repair, rest of bowel no ischemic changes, bowel edematous, abthera vac in place, abdomen open  2/14: s/p Sigmoid colectomy with ostomy, sigmoid mass resection for SBO/LBO.   Chest x-ray from this morning noted a completed white out of her left lung likely from a mucus plug.

## 2023-02-28 NOTE — PROGRESS NOTE ADULT - SUBJECTIVE AND OBJECTIVE BOX
SURGERY PROGRESS NOTE     Patient: RUBIO HUGHES , 82y ([birth year])Female   MRN: [id number]  Location: [address]  Visit: 02-10-23 Inpatient  Date: 02-28-23 @ 12:12       Events of past 24 hours:  Patient seen resting in bed. AM CXR demonstrated complete whiteout of left lung now s/p bronch. cultures sent. f/u repeat cxr. Plan for abdominal closure today.     PAST MEDICAL & SURGICAL HISTORY:  Atrial fibrillation  Hypertension  CVA (cerebrovascular accident)  HLD (hyperlipidemia)  GERD (gastroesophageal reflux disease)  History of cholecystectomy  History of hysterectomy  History of bladder suspension procedure    Vitals:   T(F): 98.5 (02-28-23 @ 06:00), Max: 99 (02-27-23 @ 20:00)  HR: 93 (02-28-23 @ 11:00)  BP: 146/65 (02-28-23 @ 11:00)  RR: 28 (02-28-23 @ 11:00)  SpO2: 99% (02-28-23 @ 11:00)  Mode: AC/ CMV (Assist Control/ Continuous Mandatory Ventilation), RR (machine): 10, TV (machine): 320, FiO2: 40, PEEP: 8, ITime: 1, MAP: 8, PIP: 21    Diet, NPO:   Except Medications      Fluids:     I & O's:    02-27-23 @ 07:01  -  02-28-23 @ 07:00  --------------------------------------------------------  IN:    dextrose 5% + sodium chloride 0.45%: 1890 mL    Enteral Tube Flush: 100 mL    IV PiggyBack: 175 mL    IV PiggyBack: 250 mL    IV PiggyBack: 199.9 mL    sodium chloride 0.9%: 180 mL  Total IN: 2794.9 mL    OUT:    Colostomy (mL): 175 mL    Indwelling Catheter - Urethral (mL): 1600 mL    VAC (Vacuum Assisted Closure) System (mL): 475 mL  Total OUT: 2250 mL    Total NET: 544.9 mL    PHYSICAL EXAM:   General: Intubated.  Cardiac: RRR.  Respiratory: On mech vent via ETT. Bilateral chest rise.  Abdomen: Soft, non-distended, no rebound, no guarding. Abthera in place on suction.  Skin: Warm/dry, normal color, no jaundice.    MEDICATIONS  (STANDING):  acetaminophen     Tablet .. 1000 milliGRAM(s) Oral every 8 hours  amLODIPine   Tablet 5 milliGRAM(s) Oral daily  aspirin  chewable 81 milliGRAM(s) Oral daily  atorvastatin 80 milliGRAM(s) Oral at bedtime  cefiderocol IVPB 1500 milliGRAM(s) IV Intermittent every 8 hours  chlorhexidine 0.12% Liquid 15 milliLiter(s) Oral Mucosa every 12 hours  chlorhexidine 2% Cloths 1 Application(s) Topical <User Schedule>  dexMEDEtomidine Infusion 0.1 MICROgram(s)/kG/Hr (2.05 mL/Hr) IV Continuous <Continuous>  dextrose 5% + sodium chloride 0.45%. 1000 milliLiter(s) (90 mL/Hr) IV Continuous <Continuous>  gabapentin 100 milliGRAM(s) Oral every 8 hours  heparin   Injectable 5000 Unit(s) SubCutaneous every 8 hours  naloxone Injectable 0.4 milliGRAM(s) IV Push once  pantoprazole  Injectable 40 milliGRAM(s) IV Push every 24 hours    MEDICATIONS  (PRN):  fentaNYL    Injectable 25 MICROGram(s) IV Push every 4 hours PRN AGITATION/TACHYPNEA  ondansetron Injectable 4 milliGRAM(s) IV Push every 6 hours PRN Nausea and/or Vomiting  oxyCODONE    IR 2.5 milliGRAM(s) Oral every 4 hours PRN Moderate Pain (4 - 6)  oxyCODONE    IR 5 milliGRAM(s) Oral every 4 hours PRN Severe Pain (7 - 10)      DVT PROPHYLAXIS: heparin   Injectable 5000 Unit(s) SubCutaneous every 8 hours    GI PROPHYLAXIS: pantoprazole  Injectable 40 milliGRAM(s) IV Push every 24 hours    ANTICOAGULATION:   ANTIBIOTICS:  cefiderocol IVPB 1500 milliGRAM(s)            LAB/STUDIES:  Labs:  CAPILLARY BLOOD GLUCOSE      POCT Blood Glucose.: 128 mg/dL (28 Feb 2023 11:48)  POCT Blood Glucose.: 111 mg/dL (28 Feb 2023 06:19)  POCT Blood Glucose.: 117 mg/dL (28 Feb 2023 00:57)  POCT Blood Glucose.: 104 mg/dL (27 Feb 2023 19:18)  POCT Blood Glucose.: 103 mg/dL (27 Feb 2023 13:45)                          7.4    18.63 )-----------( 571      ( 28 Feb 2023 05:32 )             24.3       Auto Neutrophil %: 87.5 % (02-28-23 @ 05:32)  Auto Immature Granulocyte %: 1.6 % (02-28-23 @ 05:32)    02-28    141  |  109  |  16  ----------------------------<  116<H>  3.9   |  25  |  0.5<L>      Calcium, Total Serum: 7.0 mg/dL (02-28-23 @ 04:55)      LFTs:             4.0  | 0.2  | 29       ------------------[191     ( 27 Feb 2023 06:00 )  1.8  | <0.2 | 31          Lipase:x      Amylase:x         Blood Gas Arterial, Lactate: 0.40 mmol/L (02-27-23 @ 11:42)  Blood Gas Arterial, Lactate: 0.60 mmol/L (02-27-23 @ 03:41)  Blood Gas Arterial, Lactate: 1.40 mmol/L (02-26-23 @ 03:26)  Blood Gas Arterial, Lactate: 0.90 mmol/L (02-25-23 @ 17:46)    ABG - ( 27 Feb 2023 11:42 )  pH: 7.46  /  pCO2: 36    /  pO2: 91    / HCO3: 26    / Base Excess: 1.7   /  SaO2: 99.4            ABG - ( 27 Feb 2023 03:41 )  pH: 7.50  /  pCO2: 32    /  pO2: 189   / HCO3: 25    / Base Excess: 1.8   /  SaO2: 100.0           ABG - ( 26 Feb 2023 03:26 )  pH: 7.54  /  pCO2: 31    /  pO2: 156   / HCO3: 26    / Base Excess: 3.9   /  SaO2: 100.0             Coags:     10.70  ----< 0.94    ( 27 Feb 2023 06:00 )     19.6

## 2023-02-28 NOTE — PROCEDURE NOTE - NSICDXIRPREOP_GEN_A_CORE_FT
PRE-OP DIAGNOSIS:  Acute respiratory failure, unspecified whether with hypoxia or hypercapnia 28-Feb-2023 09:34:53  Becca Cheng

## 2023-02-28 NOTE — PROGRESS NOTE ADULT - ATTENDING COMMENTS
Respiratory- on mechanical ventilation. cxr shows larissa out left lung. emergent bronchoscopy was done with opening of left lung. Cardiac- not on vasopressors. ID- on abx for VAP. Renal- good urine output (1,600), Cr 0.5. Neuro- no change, does not follow commands. follow neurology recs and MRI results. open abdomen- to OR today for washout/vac change and mesh placement.

## 2023-02-28 NOTE — PRE-ANESTHESIA EVALUATION ADULT - NSANTHADDINFOFT_GEN_ALL_CORE
no CT chest in chart, but various CT head protocols for stroke, and CT abdoment pelvis results reviewed      no PET scan, but  to extreme persistent  thrombocytosis ( 405 on initial admission, and increasing since then) , and in lieu of new stroke with global slowing and no sedation requirement on vent, new MRI results from 02/27 recommending CSF correlation of findings, physiologisc evidence of immunocompromised status with  MDR Klebsiella pneumonia with persistent leukocytosis and vent dependency for over 2 weeks, I clinically suspect a high likelihood that this patient could have metastatic gastrointestinal adenocarcinomatous disease and cancer burden physiology    it is mentioned in several consultant notes that patient  prognosis is poor, yet, I am still unsure that we know for sure  the full extent of her gastrointestinal malignancy staging despite the negative sampled lymph nodes no CT chest in chart, but various CT head protocols for stroke, and CT abdoment pelvis results reviewed      no PET scan, but  to extreme persistent  thrombocytosis ( 405 on initial admission, and increasing since then) , and in lieu of new stroke with global slowing and no sedation requirement on vent, new MRI results from 02/27 recommending CSF correlation of findings, pathophysiologic evidence of immunocompromised status with  MDR Klebsiella pneumonia with persistent leukocytosis and vent dependency for over 2 weeks, I clinically suspect a high likelihood that this patient could have metastatic gastrointestinal adenocarcinomatous disease and cancer burden physiology    it is mentioned in several consultant notes that patient  prognosis is poor, yet, I am still unsure that we know for sure  the full extent of her gastrointestinal malignancy staging despite the negative sampled lymph nodes    I think it would be important to determine the true extent of her cancer burden, not only her demonstrated debiloty and vent dependency since admission,  and aid the discussion with her dear and involved  family regarding long term care goals

## 2023-02-28 NOTE — PROGRESS NOTE ADULT - SUBJECTIVE AND OBJECTIVE BOX
RUBIO HUGHES  [id number]  82y Female    Indication for ICU admission: open abdomen s/p bowel resection for SBO  Admit Date: 2/9  ICU Date:  2/14  OR Date: 2/14, 2/16    No Known Allergies    PAST MEDICAL & SURGICAL HISTORY:  Atrial fibrillation  Hypertension  CVA (cerebrovascular accident)  HLD (hyperlipidemia)  GERD (gastroesophageal reflux disease)  History of cholecystectomy  History of hysterectomy  History of bladder suspension procedure    24HRS EVENT:  2/27  NIGHT  -MRI done  -OR in AM?   -AM ABG     DAY  decreased TV to 320 (calculated 6ml/IDW would need 280ml)  ABG 11am  Change IVF to D5 1/2 NS @90ml  Echo: Left ventricular ejection fraction, by visual estimation, is 50 to 55%. Mildly decreased global left ventricular systolic function. Multiple left ventricular regional wall motion abnormalities exist. Color flow doppler and intravenous injection of agitated saline demonstrate the presence of an intact atrial septum.  Family discussion with Dr. Rebollar and neurology- plan is to proceed with surgery    REVIEW OF SYSTEMS  [ ] A ten-point review of systems was otherwise negative except as noted.  [ x] Due to altered mental status/intubation, subjective information were not able to be obtained from the patient. History was obtained, to the extent possible, from review of the chart and collateral sources of information  ********************************************************************************************************   RUBIO HUGHES  [id number]  82y Female    Indication for ICU admission: open abdomen s/p bowel resection for SBO  Admit Date: 2/9  ICU Date:  2/14  OR Date: 2/14, 2/16    No Known Allergies    PAST MEDICAL & SURGICAL HISTORY:  Atrial fibrillation  Hypertension  CVA (cerebrovascular accident)  HLD (hyperlipidemia)  GERD (gastroesophageal reflux disease)  History of cholecystectomy  History of hysterectomy  History of bladder suspension procedure    24HRS EVENT:  2/27  NIGHT  -MRI done  -OR in AM?   -AM ABG     DAY  decreased TV to 320 (calculated 6ml/IDW would need 280ml)  ABG 11am  Change IVF to D5 1/2 NS @90ml  Echo: Left ventricular ejection fraction, by visual estimation, is 50 to 55%. Mildly decreased global left ventricular systolic function. Multiple left ventricular regional wall motion abnormalities exist. Color flow doppler and intravenous injection of agitated saline demonstrate the presence of an intact atrial septum.  Family discussion with Dr. Rebollar and neurology- plan is to proceed with surgery    REVIEW OF SYSTEMS  [ ] A ten-point review of systems was otherwise negative except as noted.  [ x] Due to altered mental status/intubation, subjective information were not able to be obtained from the patient. History was obtained, to the extent possible, from review of the chart and collateral sources of information  ********************************************************************************************************  Daily     Daily     Diet, NPO:   Except Medications (02-27-23 @ 18:30)      CURRENT MEDS:  Neurologic Medications  acetaminophen     Tablet .. 1000 milliGRAM(s) Oral every 8 hours  dexMEDEtomidine Infusion 0.1 MICROgram(s)/kG/Hr IV Continuous <Continuous>  fentaNYL    Injectable 25 MICROGram(s) IV Push every 4 hours PRN AGITATION/TACHYPNEA  gabapentin 100 milliGRAM(s) Oral every 8 hours  ondansetron Injectable 4 milliGRAM(s) IV Push every 6 hours PRN Nausea and/or Vomiting  oxyCODONE    IR 2.5 milliGRAM(s) Oral every 4 hours PRN Moderate Pain (4 - 6)  oxyCODONE    IR 5 milliGRAM(s) Oral every 4 hours PRN Severe Pain (7 - 10)    Respiratory Medications    Cardiovascular Medications  amLODIPine   Tablet 5 milliGRAM(s) Oral daily    Gastrointestinal Medications  dextrose 5% + sodium chloride 0.45%. 1000 milliLiter(s) IV Continuous <Continuous>  pantoprazole  Injectable 40 milliGRAM(s) IV Push every 24 hours    Genitourinary Medications    Hematologic/Oncologic Medications  aspirin  chewable 81 milliGRAM(s) Oral daily  heparin   Injectable 5000 Unit(s) SubCutaneous every 8 hours    Antimicrobial/Immunologic Medications  cefiderocol IVPB 1500 milliGRAM(s) IV Intermittent every 8 hours    Endocrine/Metabolic Medications  atorvastatin 80 milliGRAM(s) Oral at bedtime    Topical/Other Medications  chlorhexidine 0.12% Liquid 15 milliLiter(s) Oral Mucosa every 12 hours  chlorhexidine 2% Cloths 1 Application(s) Topical <User Schedule>  naloxone Injectable 0.4 milliGRAM(s) IV Push once      ICU Vital Signs Last 24 Hrs  T(C): 36.9 (28 Feb 2023 06:00), Max: 37.2 (27 Feb 2023 20:00)  T(F): 98.5 (28 Feb 2023 06:00), Max: 99 (27 Feb 2023 20:00)  HR: 94 (28 Feb 2023 08:00) (79 - 103)  BP: 164/76 (28 Feb 2023 08:00) (121/56 - 164/76)  BP(mean): 109 (28 Feb 2023 08:00) (81 - 109)  ABP: --  ABP(mean): --  RR: 20 (28 Feb 2023 08:00) (20 - 33)  SpO2: 100% (28 Feb 2023 08:00) (97% - 100%)    O2 Parameters below as of 28 Feb 2023 08:00  Patient On (Oxygen Delivery Method): ventilator    O2 Concentration (%): 40        Mode: AC/ CMV (Assist Control/ Continuous Mandatory Ventilation)  RR (machine): 10  TV (machine): 320  FiO2: 40  PEEP: 5  ITime: 1  MAP: 8  PIP: 18    ABG - ( 27 Feb 2023 11:42 )  pH, Arterial: 7.46  pH, Blood: x     /  pCO2: 36    /  pO2: 91    / HCO3: 26    / Base Excess: 1.7   /  SaO2: 99.4                I&O's Summary    27 Feb 2023 07:01  -  28 Feb 2023 07:00  --------------------------------------------------------  IN: 2794.9 mL / OUT: 2250 mL / NET: 544.9 mL    28 Feb 2023 07:01  -  28 Feb 2023 08:25  --------------------------------------------------------  IN: 180 mL / OUT: 120 mL / NET: 60 mL      I&O's Detail    27 Feb 2023 07:01  -  28 Feb 2023 07:00  --------------------------------------------------------  IN:    dextrose 5% + sodium chloride 0.45%: 1890 mL    Enteral Tube Flush: 100 mL    IV PiggyBack: 175 mL    IV PiggyBack: 250 mL    IV PiggyBack: 199.9 mL    sodium chloride 0.9%: 180 mL  Total IN: 2794.9 mL    OUT:    Colostomy (mL): 175 mL    Indwelling Catheter - Urethral (mL): 1600 mL    VAC (Vacuum Assisted Closure) System (mL): 475 mL  Total OUT: 2250 mL    Total NET: 544.9 mL      28 Feb 2023 07:01  -  28 Feb 2023 08:25  --------------------------------------------------------  IN:    dextrose 5% + sodium chloride 0.45%: 180 mL  Total IN: 180 mL    OUT:    Indwelling Catheter - Urethral (mL): 70 mL    Nasogastric/Oral tube (mL): 50 mL  Total OUT: 120 mL    Total NET: 60 mL          PHYSICAL EXAM:    General/Neuro  RASS:             GCS:     = E   / V   / M      Deficits:                             alert & oriented x 3, no focal deficits  Pupils:    Lungs:      clear to auscultation, Normal expansion/effort.     Cardiovascular : S1, S2.  Regular rate and rhythm.  Peripheral edema   Cardiac Rhythm: Normal Sinus Rhythm    GI: Abdomen soft, Non-tender, Non-distended.    Gastrostomy / Jejunostomy tube in place.  Nasogastric tube in place.  Colostomy / Ileostomy.    Wound:    Extremities: Extremities warm, pink, well-perfused. Pulses:Rt     Lt    Derm: Good skin turgor, no skin breakdown.      :       Donis catheter in place.      CXR:     LABS:  CAPILLARY BLOOD GLUCOSE      POCT Blood Glucose.: 111 mg/dL (28 Feb 2023 06:19)  POCT Blood Glucose.: 117 mg/dL (28 Feb 2023 00:57)  POCT Blood Glucose.: 104 mg/dL (27 Feb 2023 19:18)  POCT Blood Glucose.: 103 mg/dL (27 Feb 2023 13:45)                          7.4    18.63 )-----------( 571      ( 28 Feb 2023 05:32 )             24.3       02-28    141  |  109  |  16  ----------------------------<  116<H>  3.9   |  25  |  0.5<L>    Ca    7.0<L>      28 Feb 2023 04:55  Phos  2.2     02-28  Mg     1.9     02-28    TPro  4.0<L>  /  Alb  1.8<L>  /  TBili  0.2  /  DBili  <0.2  /  AST  29  /  ALT  31  /  AlkPhos  191<H>  02-27      PT/INR - ( 27 Feb 2023 06:00 )   PT: 10.70 sec;   INR: 0.94 ratio         PTT - ( 27 Feb 2023 06:00 )  PTT:19.6 sec           RUBIO HUGHES  [id number]  82y Female    Indication for ICU admission: open abdomen s/p bowel resection for SBO  Admit Date: 2/9  ICU Date:  2/14  OR Date: 2/14, 2/16    No Known Allergies    PAST MEDICAL & SURGICAL HISTORY:  Atrial fibrillation  Hypertension  CVA (cerebrovascular accident)  HLD (hyperlipidemia)  GERD (gastroesophageal reflux disease)  History of cholecystectomy  History of hysterectomy  History of bladder suspension procedure    24HRS EVENT:  2/27  NIGHT  -MRI done  -OR in AM?   -AM ABG     DAY  decreased TV to 320 (calculated 6ml/IDW would need 280ml)  ABG 11am  Change IVF to D5 1/2 NS @90ml  Echo: Left ventricular ejection fraction, by visual estimation, is 50 to 55%. Mildly decreased global left ventricular systolic function. Multiple left ventricular regional wall motion abnormalities exist. Color flow doppler and intravenous injection of agitated saline demonstrate the presence of an intact atrial septum.  Family discussion with Dr. Rebollar and neurology- plan is to proceed with surgery    REVIEW OF SYSTEMS  [ ] A ten-point review of systems was otherwise negative except as noted.  [ x] Due to altered mental status/intubation, subjective information were not able to be obtained from the patient. History was obtained, to the extent possible, from review of the chart and collateral sources of information  ********************************************************************************************************  Daily     Daily     Diet, NPO:   Except Medications (02-27-23 @ 18:30)      CURRENT MEDS:  Neurologic Medications  acetaminophen     Tablet .. 1000 milliGRAM(s) Oral every 8 hours  dexMEDEtomidine Infusion 0.1 MICROgram(s)/kG/Hr IV Continuous <Continuous>  fentaNYL    Injectable 25 MICROGram(s) IV Push every 4 hours PRN AGITATION/TACHYPNEA  gabapentin 100 milliGRAM(s) Oral every 8 hours  ondansetron Injectable 4 milliGRAM(s) IV Push every 6 hours PRN Nausea and/or Vomiting  oxyCODONE    IR 2.5 milliGRAM(s) Oral every 4 hours PRN Moderate Pain (4 - 6)  oxyCODONE    IR 5 milliGRAM(s) Oral every 4 hours PRN Severe Pain (7 - 10)    Cardiovascular Medications  amLODIPine   Tablet 5 milliGRAM(s) Oral daily    Gastrointestinal Medications  dextrose 5% + sodium chloride 0.45%. 1000 milliLiter(s) IV Continuous <Continuous>  pantoprazole  Injectable 40 milliGRAM(s) IV Push every 24 hours    Hematologic/Oncologic Medications  aspirin  chewable 81 milliGRAM(s) Oral daily  heparin   Injectable 5000 Unit(s) SubCutaneous every 8 hours    Antimicrobial/Immunologic Medications  cefiderocol IVPB 1500 milliGRAM(s) IV Intermittent every 8 hours    Endocrine/Metabolic Medications  atorvastatin 80 milliGRAM(s) Oral at bedtime    Topical/Other Medications  chlorhexidine 0.12% Liquid 15 milliLiter(s) Oral Mucosa every 12 hours  chlorhexidine 2% Cloths 1 Application(s) Topical <User Schedule>  naloxone Injectable 0.4 milliGRAM(s) IV Push once      ICU Vital Signs Last 24 Hrs  T(C): 36.9 (28 Feb 2023 06:00), Max: 37.2 (27 Feb 2023 20:00)  T(F): 98.5 (28 Feb 2023 06:00), Max: 99 (27 Feb 2023 20:00)  HR: 94 (28 Feb 2023 08:00) (79 - 103)  BP: 164/76 (28 Feb 2023 08:00) (121/56 - 164/76)  BP(mean): 109 (28 Feb 2023 08:00) (81 - 109)  RR: 20 (28 Feb 2023 08:00) (20 - 33)  SpO2: 100% (28 Feb 2023 08:00) (97% - 100%)    O2 Parameters below as of 28 Feb 2023 08:00  Patient On (Oxygen Delivery Method): ventilator    O2 Concentration (%): 40        Mode: AC/ CMV (Assist Control/ Continuous Mandatory Ventilation)  RR (machine): 10  TV (machine): 320  FiO2: 40  PEEP: 5  ITime: 1  MAP: 8  PIP: 18    ABG - ( 27 Feb 2023 11:42 )  pH, Arterial: 7.46  pH, Blood: x     /  pCO2: 36    /  pO2: 91    / HCO3: 26    / Base Excess: 1.7   /  SaO2: 99.4                I&O's Summary    27 Feb 2023 07:01  -  28 Feb 2023 07:00  --------------------------------------------------------  IN: 2794.9 mL / OUT: 2250 mL / NET: 544.9 mL    28 Feb 2023 07:01  -  28 Feb 2023 08:25  --------------------------------------------------------  IN: 180 mL / OUT: 120 mL / NET: 60 mL      I&O's Detail    27 Feb 2023 07:01  -  28 Feb 2023 07:00  --------------------------------------------------------  IN:    dextrose 5% + sodium chloride 0.45%: 1890 mL    Enteral Tube Flush: 100 mL    IV PiggyBack: 175 mL    IV PiggyBack: 250 mL    IV PiggyBack: 199.9 mL    sodium chloride 0.9%: 180 mL  Total IN: 2794.9 mL    OUT:    Colostomy (mL): 175 mL    Indwelling Catheter - Urethral (mL): 1600 mL    VAC (Vacuum Assisted Closure) System (mL): 475 mL  Total OUT: 2250 mL    Total NET: 544.9 mL        PHYSICAL EXAM:    General/Neuro  RASS:   0          GCS:  8T   = E 4  / V 1T  / M    3  awake, not alert. Non-verbal  Grimaces to pain applied to the upper extremities  slight upward flexion of toes of right foot with pain to plantar surface      Lungs:  Intubated, ventilated  Right lung field clear A-P  Left lung field with course bronchial breath sounds through out    Cardiovascular : S1, S2.  Regular rate and rhythm.  Peripheral edema +++ pitting  Cardiac Rhythm: Normal Sinus Rhythm    GI: Abdomen soft, Non-tender,   Nasogastric tube in place.  Ostomy is pink with stool and air in ostomy bag   Wound: Vac/ Abthera sponge in place    Extremities: Extremities warm, pink, well-perfused.    Derm: Poor skin integrity- large skin tear/ degloving of right upper extremity      :       Donis catheter in place.- strict I/Os      CXR: AM CXR with white out of left lung    LABS:  CAPILLARY BLOOD GLUCOSE  POCT Blood Glucose.: 111 mg/dL (28 Feb 2023 06:19)  POCT Blood Glucose.: 117 mg/dL (28 Feb 2023 00:57)  POCT Blood Glucose.: 104 mg/dL (27 Feb 2023 19:18)  POCT Blood Glucose.: 103 mg/dL (27 Feb 2023 13:45)                          7.4    18.63 )-----------( 571      ( 28 Feb 2023 05:32 )             24.3       02-28    141  |  109  |  16  ----------------------------<  116<H>  3.9   |  25  |  0.5<L>    Ca    7.0<L>      28 Feb 2023 04:55  Phos  2.2     02-28  Mg     1.9     02-28    TPro  4.0<L>  /  Alb  1.8<L>  /  TBili  0.2  /  DBili  <0.2  /  AST  29  /  ALT  31  /  AlkPhos  191<H>  02-27      PT/INR - ( 27 Feb 2023 06:00 )   PT: 10.70 sec;   INR: 0.94 ratio    PTT - ( 27 Feb 2023 06:00 )  PTT:19.6 sec

## 2023-02-28 NOTE — PRE-ANESTHESIA EVALUATION ADULT - NSANTHOSAYNRD_GEN_A_CORE
No. LEONIDES screening performed.  STOP BANG Legend: 0-2 = LOW Risk; 3-4 = INTERMEDIATE Risk; 5-8 = HIGH Risk

## 2023-02-28 NOTE — PROCEDURE NOTE - NSICDXPROCEDURE_GEN_ALL_CORE_FT
PROCEDURES:  Insertion, arterial line, percutaneous 19-Feb-2023 20:06:39 left radial Kimberly Smith  
PROCEDURES:  Bronchoscopy 28-Feb-2023 09:36:46  Becca Cheng  
PROCEDURES:  Insertion, midline catheter 26-Feb-2023 05:48:32  Prince Mickey

## 2023-02-28 NOTE — PRE-ANESTHESIA EVALUATION ADULT - MALLAMPATI CLASS
Class II - visualization of the soft palate, fauces, and uvula unable to asses due to pt being intubated/Class II - visualization of the soft palate, fauces, and uvula

## 2023-02-28 NOTE — PROCEDURE NOTE - NSICDXIRPOSTOP_GEN_A_CORE_FT
POST-OP DIAGNOSIS:  Acute respiratory failure, unspecified whether with hypoxia or hypercapnia 28-Feb-2023 09:34:07  Becca Cheng  Acute respiratory failure, unspecified whether with hypoxia or hypercapnia 28-Feb-2023 09:34:34  Becca Cheng

## 2023-02-28 NOTE — PROCEDURAL SAFETY CHECKLIST WITH OR WITHOUT SEDATION - NSPREANESCONSENT_GEN_ALL_CORE
History


Report prepared by Elliot:  Derek Valderrama


Under the Supervision of:  SASHA CoxO.


First contact with patient:  09:07


Chief Complaint:  SWELLING TO EXTREMITY


Stated Complaint:  SWOLLEN LEGS, SOB, WHEEZING





History of Present Illness


The patient is a 60 year old female who presents to the Emergency Room with 

complaints of constant bilateral lower extremity swelling that began 1.5 weeks 

ago. She rates her pain as 3/10. The patient had blood work done by her PCP (

Dr. Melanie Eng, [facility]) yesterday which showed an elevated D-Dimer 

and was referred to the ED for a CT. The patient reports SOB and wheezing. She 

has a history of blood clots and takes Aspirin. The patient denies chest pain. 

Of note, the patient recently traveled to [place].





   Source of History:  patient


   Onset:  1.5 weeks ago


   Position:  leg (bilateral)


   Symptom Intensity:  pain rated as 3/10


   Quality:  other (swelling)


   Timing:  constant


   Associated Symptoms:  + SOB, No chest pain





Review of Systems


See HPI for pertinent positives & negatives. A total of 10 systems reviewed and 

were otherwise negative.





Past Medical & Surgical


Medical Problems:


(1) Breast lump in female


(2) Rectal bleed








Social History


Smoking Status:  Never Smoker


Drug Use:  none


Marital Status:  


Housing Status:  lives with family


Occupation Status:  employed





Current/Historical Medications


Scheduled


Fish Oil (Gillette-3), 1 CAP PO DAILY


Glimepiride (Glimepiride), 1 TAB PO DAILY


Levothyroxine Sodium (Levothyroxine Sodium), 1 TAB PO DAILY


Metoprolol Tartrate (Lopressor) (Lopressor), 12.5 MG PO BID


Multivitamin (Multivitamin), 1 TAB PO DAILY


Niacin (Niacin), 50 MG PO DAILY


Turmeric (Curcuma Longa) (Turmeric), 1 TAB PO DAILY





Allergies


Coded Allergies:  


     Metformin (Unverified  Adverse Reaction, Mild, CAUSES HIGH BLOOD PRESSURE, 

4/14/18)





Physical Exam


Vital Signs











  Date Time  Temp Pulse Resp B/P (MAP) Pulse Ox O2 Delivery O2 Flow Rate FiO2


 


4/14/18 11:48  65 18 165/92 99 Room Air  


 


4/14/18 10:23  75 18 184/75 98 Room Air  


 


4/14/18 08:54 36.8 79 18 160/81 97 Room Air  











Physical Exam


CONSTITUTIONAL/VITAL SIGNS: Reviewed / noted above.


GENERAL: Non-toxic in appearance. 


INTEGUMENTARY: Warm, dry, and Pink.


HEAD: Normocephalic.


EYES: without scleral icterus or trauma.


ENT/OROPHARYNX: clear and moist.


LYMPHADENOPATHY/NECK: Is supple without lymphadenopathy or meningismus.


RESPIRATORY: Lungs clear and equal.


CARDIOVASCULAR: Regular rate and rhythm.


GI/ABDOMEN: Soft and nontender. No organomegaly or pulsatile mass. No rebound 

or guarding. Normal bowel sounds.


EXTREMITIES: Mild bilateral pedal edema. Warm and well perfused.


BACK: No CVA tenderness.


NEUROLOGICAL: Intact without focal deficits. 


PSYCHIATRIC: normal affect.


MUSCULOSKELETAL: Normally developed with good muscle tone.





Medical Decision & Procedures


ER Provider


Diagnostic Interpretation:


Radiology results as stated below per my review and radiologist interpretation:





CHEST ONE VIEW PORTABLE





CLINICAL HISTORY: Fever, sepsis, shortness of breath.    





COMPARISON STUDY:  4/13/2018





FINDINGS: The cardiac and mediastinal contours are normal. There is no evidence


of focal pulmonary consolidation. There is no evidence of failure. No pleural


effusions are visualized.[ Slight prominence of the basilar markings, are likely


secondary to atelectatic changes





IMPRESSION: No active disease in the chest.





Electronically signed by:  Gildardo Waller M.D.


4/14/2018 9:37 AM





Dictated Date/Time:  4/14/2018 9:36 AM








CT ANGIOGRAM OF THE CHEST





CLINICAL HISTORY: Fever, sepsis, shortness of breath.    





COMPARISON STUDY:  1/13/2015 





TECHNIQUE: Following the IV administration of 94 mL of Optiray-320, CT angiogram


of the thorax was performed from the thoracic inlet to the lung bases utilizing


the pulmonary embolus protocol. Images are reviewed in the axial, sagittal, and


coronal planes. IV contrast was administered without complication. MIP imaging


was performed.  A dose lowering technique was utilized adhering to the


principles of ALARA.





CT DOSE: 685.05 mGy.cm





FINDINGS:





There is hepatic steatosis.





Mediastinal and hilar lymph nodes are the upper limits of normal in size





There was no evidence of thoracic aortic dilatation.





There is a tiny right lower lobe pulmonary artery filling defect. A similar but


larger filling defect was present on the 2015 study. It is therefore difficult


to determine whether this is acute or chronic.





No pleural effusions are visualized.





There is respiratory motion artifact. There are dependent atelectatic changes.


There is mild lower lobe bronchial wall thickening. There is no lobar


consolidation. There is a calcified right upper lobe granuloma. There is a


noncalcified 3 mm right upper lobe pulmonary nodule as visualized in image


#217/288. This measured 2 mm in 2015. There is a 2.5 mm left lower lobe point


nodule as visualized in image #138/288. Also evident is a 2.5 mm left lower


pulmonary nodule as visualized in image #119/288.





IMPRESSION:  


1. Tiny right lower lobe pulmonary artery embolus. This is smaller than on the


prior January 2015 study, and is therefore age-indeterminate. Correlation with


leg ultrasonography would seem prudent.


2. Hepatic steatosis


3. Scattered tiny pulmonary nodules. In a low risk patient, no further follow-up


is indicated. In a high risk patient, a 12 month follow-up is optional.





Please refer to below summary of Fleischner criteria recommendations for


follow-up of incidental CT nodules (CLEVE Wilhelm, Guidelines for management of


small pulmonary nodules detected on CT scans:  A statement from the Fleischner


Society, Radiology [phone]: [phone].)





SOLID NODULES





Solitary nodule size: <6 mm


*  low risk patients:  no follow-up needed


*  high risk patients:  optional CT at 12 months





Solitary nodule size:  6-8 mm


*  low risk patients:  follow-up at 6-12 months, then consider further follow-up


at 18-24 months


*  high risk patients:  initial follow-up CT at 6-12 months and then at 18-24


months if no change





Solitary nodule size: >8 mm


*  either low or high risk patients - consider follow-up CT at 3 months, and/or


CT-PET, and/or biopsy





Multiple nodules size:  <6 mm


*  low risk patients:  no routine follow-up


*  high risk patients:  optional CT at 12 months





Multiple nodules size:  6-8 mm


*  low risk patients:  follow-up at 3-6 months, then consider further follow-up


at 18-24 months


*  high risk patients:  follow-up at 3-6 months, then at 18-24 months if no


change





Multiple nodules size:  >8 mm


*  low risk patients:  follow-up at 3-6 months, then consider further follow-up


at 18-24 months


*  high risk patients:  follow-up at 3-6 months, then at 18-24 months if no


change





Note:  newly detected indeterminate nodule in persons 35 years of age or older.


*  low risk patients:  minimal or absent history of smoking and/or other known


risk factors


*  high risk patients:  history of smoking or of other known risk factors (e.g.


first degree relative with lung cancer, or exposure to asbestos, radon, uranium)


*  if a nodule up to 8 mm is partly solid or is ground glass further follow-up


is required after 24 months to exclude possible slow growing adenocarcinoma


(KELSI)





SUBSOLID NODULES





Solitary pure ground-glass nodule


*  nodule size <6 mm - no CT follow-up required


*  nodule size >=6 mm - follow-up CT at 6-12 months, then every 2 years until 5


years





Solitary part-solid nodule


*  nodule size <6 mm - no CT follow-up required


*  nodule size >=6 mm - follow-up CT at 3-6 months.  If unchanged, and solid


component remains <6 mm, then annual follow-up for 5 years





Multiple subsolid nodules


*  nodule size <6 mm - follow-up CT at 3-6 months, consider further follow-up at


2 and 4 years if stable


*  nodule size >=6 mm - follow-up CT at 3-6 months, subsequent management based


on the most suspicious nodule(s)


    


Electronically signed by:  Gildardo Waller M.D.


4/14/2018 10:17 AM





Dictated Date/Time:  4/14/2018 10:07 AM








ULTRASOUND VENOUS DOPPLER LWR EXT RUBIN 





CLINICAL HISTORY: Age-indeterminate pulmonary embolus    





COMPARISON STUDY:  5/4/2015 





FINDINGS: Real-time and color flow Doppler imaging were performed. Flow was seen


within the femoral, popliteal and calf veins with no intraluminal thrombus


demonstrated. The saphenous vein is patent.





IMPRESSION: No evidence of deep venous thrombosis.





Electronically signed by:  Gildardo Waller M.D.


4/14/2018 11:31 AM





Dictated Date/Time:  4/14/2018 11:30 AM





Laboratory Results


4/14/18 09:20








Red Blood Count 4.04, Mean Corpuscular Volume 96.0, Mean Corpuscular Hemoglobin 

31.9, Mean Corpuscular Hemoglobin Concent 33.2, Mean Platelet Volume 10.5, 

Neutrophils (%) (Auto) 60.1, Lymphocytes (%) (Auto) 26.3, Monocytes (%) (Auto) 

12.1, Eosinophils (%) (Auto) 0.9, Basophils (%) (Auto) 0.2, Neutrophils # (Auto

) 3.23, Lymphocytes # (Auto) 1.41, Monocytes # (Auto) 0.65, Eosinophils # (Auto

) 0.05, Basophils # (Auto) 0.01





4/14/18 09:20

















Test


  4/14/18


09:20


 


White Blood Count


  5.37 K/uL


(4.8-10.8)


 


Red Blood Count


  4.04 M/uL


(4.2-5.4)


 


Hemoglobin


  12.9 g/dL


(12.0-16.0)


 


Hematocrit 38.8 % (37-47) 


 


Mean Corpuscular Volume


  96.0 fL


()


 


Mean Corpuscular Hemoglobin


  31.9 pg


(25-34)


 


Mean Corpuscular Hemoglobin


Concent 33.2 g/dl


(32-36)


 


Platelet Count


  193 K/uL


(130-400)


 


Mean Platelet Volume


  10.5 fL


(7.4-10.4)


 


Neutrophils (%) (Auto) 60.1 % 


 


Lymphocytes (%) (Auto) 26.3 % 


 


Monocytes (%) (Auto) 12.1 % 


 


Eosinophils (%) (Auto) 0.9 % 


 


Basophils (%) (Auto) 0.2 % 


 


Neutrophils # (Auto)


  3.23 K/uL


(1.4-6.5)


 


Lymphocytes # (Auto)


  1.41 K/uL


(1.2-3.4)


 


Monocytes # (Auto)


  0.65 K/uL


(0.11-0.59)


 


Eosinophils # (Auto)


  0.05 K/uL


(0-0.5)


 


Basophils # (Auto)


  0.01 K/uL


(0-0.2)


 


RDW Standard Deviation


  44.7 fL


(36.4-46.3)


 


RDW Coefficient of Variation


  12.8 %


(11.5-14.5)


 


Immature Granulocyte % (Auto) 0.4 % 


 


Immature Granulocyte # (Auto)


  0.02 K/uL


(0.00-0.02)


 


Prothrombin Time


  10.6 SECONDS


(9.0-12.0)


 


Prothromb Time International


Ratio 1.0 (0.9-1.1) 


 


 


Activated Partial


Thromboplast Time 25.4 SECONDS


(21.0-31.0)


 


Partial Thromboplastin Ratio 1.0 


 


D-Dimer


  2020 ug/L FEU


(0-500)


 


Anion Gap


  7.0 mmol/L


(3-11)


 


Est Creatinine Clear Calc


Drug Dose 71.7 ml/min 


 


 


Estimated GFR (


American) 68.3 


 


 


Estimated GFR (Non-


American 58.9 


 


 


BUN/Creatinine Ratio 12.8 (10-20) 


 


Calcium Level


  8.1 mg/dl


(8.5-10.1)


 


Total Creatine Kinase


  85 U/L


()


 


Creatine Kinase MB


  1.6 ng/ml


(0.5-3.6)


 


Creatine Kinase MB Ratio 1.9 (0-3.0) 


 


Troponin I


  < 0.015 ng/ml


(0-0.045)





Laboratory results as stated above per my review.





ECG Per My Interpretation


Indication:  SOB/dyspnea


Rate (beats per minute):  72


Rhythm:  sinus rhythm


Findings:  PAC, other (No ST elevation)





ED Course


0907: Previous medical records were reviewed. The patient was evaluated in room 

B11B. A complete history and physical examination was performed.





1055: I checked on the patient and updated her on the results of her imaging. I 

told the patient that we will be doing an ultrasound of her legs.





1200: On reevaluation, the patient is resting. I discussed the results and 

findings with the patient. She verbalized agreement of the treatment plan. She 

was discharged home.





Medical Decision


the differential was considered includes acute myocardial infarction, acute 

coronary syndrome, myocarditis, pericarditis, pericardial effusions /tamponade, 

esophageal perforation, thoracic aortic dissection, pulmonary embolism, 

pneumonia, pneumothorax, pancreatitis, shingles, acute cholecystitis, 

perforated abdominal viscus.





This is a 60-year-old female who presents to the ED with a chief complaint of 

positive D-dimer test.  The patient states that she was at her PCPs office 

yesterday and had some blood work and had a positive d-dimer.  She reports that 

she was there with a chief complaint of some shortness of breath over the past 

week as well as some lower extremity edema for the past week and a half.  The 

patient does have a history of DVT in 2005 is currently not on anticoagulation.

  The patient's physical exam as noted above.  Nothing significant other than 

some lower extremity edema.  Initial blood pressure was slightly elevated.  The 

patient did have a positive d-dimer here.  PRP was normal CBC is normal, EKG 

shows a sinus rhythm without ischemic changes.  CT scan of the chest reveals a 

tiny right lower lobe pulmonary artery embolus of uncertain age.  It was 

reported that it is smaller than one seen in 2015.  Lower extremity ultrasound 

is recommended.  These were performed and did not reveal DVT.  The patient was 

told the results.  She reports that she was not started on anticoagulation 

2015.  She is felt to be stable for discharge.





Medication Reconcilliation


Current Medication List:  was personally reviewed by me





Blood Pressure Screening


Patient's blood pressure:  Elevated blood pressure


Blood pressure disposition:  Elevated BP felt to be situational





Impression





 Primary Impression:  


 Swelling of left extremity


 Additional Impressions:  


 Swelling of right extremity


 Dyspnea





Scribe Attestation


The scribe's documentation has been prepared under my direction and personally 

reviewed by me in its entirety. I confirm that the note above accurately 

reflects all work, treatment, procedures, and medical decision making performed 

by me.





Departure Information


Dispostion


Home / Self-Care





Referrals


Melanie Eng M.D. (PCP)





Patient Instructions


[facility]





Additional Instructions





Follow-up with your doctor for further care and evaluation in 1-2 days.  Return 

to the emergency department for worsening or new symptoms or any concerns.


You have been examined and treated today on an emergency basis only. This is 

not a substitute for, or an effort to provide, complete comprehensive medical 

care. It is impossible to recognize and treat all injuries or illnesses in a 

single emergency department visit. It is therefore important that you follow up 

closely with your doctor.  Call as soon as possible for an appointment.





Problem Qualifiers
n/a
100
n/a
n/a

## 2023-02-28 NOTE — PRE-ANESTHESIA EVALUATION ADULT - NSANTHPMHFT_GEN_ALL_CORE
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  pt with prior history of stroke prior to this admission  and residual finger numbess, developed a new stroke on 02/23, with recent EEG showing moderate diffuse global slowing and mri head on 02/27 showing diffusely abnormal signaling bilaterally, with neoplastic etiology that cannot be excluded and CSF correlation recommended.      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  pt with prior history of stroke prior to this admission  and residual finger numbess, developed a new stroke on 02/23, with recent EEG showing moderate diffuse global slowing and mri head on 02/27 showing diffusely abnormal signaling bilaterally, with neoplastic etiology that cannot be excluded and CSF correlation recommended.      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  pt with prior history of stroke prior to this admission  and residual finger numbess, developed a new stroke on 02/23, with recent EEG showing moderate diffuse global slowing and mri head on 02/27 showing diffusely abnormal signaling bilaterally, with neoplastic etiology that cannot be excluded and CSF correlation recommended    status post multiple abdominal wash outs in the OR      pt had a bronschoscopy on 02/28 and lavage of the left lung due to near white out  of left lung on chest xray this early AM, new cultures sent; patient on contact isolation for MDR Klebsiella Pneumonia

## 2023-02-28 NOTE — PRE-ANESTHESIA EVALUATION ADULT - NSANTHLABRESULTSFT_GEN_ALL_CORE
MRI results on 02/28 : extreme thrombocytosis noted, anemia of chronic disease noted on admission labs evident after pt was fluid rescuscitated upo initial presentation with severe dehydration due to vomiting/diarrhea     MRI results on 02/28 : extreme thrombocytosis noted, leukocytosis noted,  anemia noted on admission labs evident after pt was fluid rescuscitated upon initial presentation with severe dehydration due to vomiting/diarrhea     leukocytosis with increased neutrophils noted

## 2023-02-28 NOTE — PROGRESS NOTE ADULT - ASSESSMENT
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 SBO. No pneumoperitoneum.  Hosp course : SBO s/p diagnostic lap converted to exploratory laparotomy, sigmoidectomy, cecal enterotomy w/ stool expression and closure, descending colostomy, and abthera placement.    2/23 CT Brain Stroke Protocol : bilateral thalami and bilateral occipitotemporal with acute infarct/ischemic injury. No intracranial   hemorrhage or midline shift.    IMPRESSION;  Encephalopathy > non responsive off sedation  Neuro recs appreciated  2/24 EEG : generalized slowing, no Sz activity  Very poor prognosis  Bacterial PNA secondary to MDR Klebsiella  CXR : no new consolidation  2/15 BAL Klebsiella  WBC 26.7    RECOMMENDATIONS;  No change in ABx  monitor WBC  Fetroja 2 gm iv q8h  Off loading to prevent pressure sores and preventive measures to avoid aspiration   f/u resp status and CXR s/p bronchoscopy     Trauma/ACS  Spectra [phone]

## 2023-02-28 NOTE — PRE-ANESTHESIA EVALUATION ADULT - NSPROPOSEDPROCEDFT_GEN_ALL_CORE
Exploratory Laparotomy
Second look with washout
abdominal washout and closure
wound vac placement, Peg tube placement

## 2023-02-28 NOTE — PROCEDURAL SAFETY CHECKLIST WITH OR WITHOUT SEDATION - FIRE RISK ASSESSMENT ADDRESSED
[FreeTextEntry1] : follow up for  Graves disease. \par she has colitis and IBS. \par NTMNG s/p FNA 2014. \par since she started MMi she feels much better with her colitis \par she was seen in ER recently for possible CVA and she signed AMA \par \par thyroid US shows multiple subcentimeter nodule stable. monitor and repeat US in Nov 2019\par thyroid uptake/scan shows uptake 65 %    
n/a

## 2023-02-28 NOTE — PRE-ANESTHESIA EVALUATION ADULT - NSANTHPEFT_GEN_ALL_CORE
central obesity  open abdomen     unsedated, on ventilator with minimal response and reaction to vent    ctab    irregular rhyth m central obesity  open abdomen ostomy in place     unsedated, on ventilator with minimal response and reaction to vent    ctab    irregular rhythm noted central obesity  open abdomen, ostomy in place, rodriguez in place     unsedated, on ventilator with minimal response and reaction to vent    ctab    irregular rhythm noted

## 2023-03-01 NOTE — BRIEF OPERATIVE NOTE - NSICDXBRIEFPOSTOP_GEN_ALL_CORE_FT
POST-OP DIAGNOSIS:  Large bowel obstruction 14-Feb-2023 05:59:51  Vito Zepeda  
POST-OP DIAGNOSIS:  Open wound of abdominal wall 19-Feb-2023 15:16:40  Pierce Seaman  
POST-OP DIAGNOSIS:  Large bowel obstruction 14-Feb-2023 05:59:51  Vito Zepeda  
POST-OP DIAGNOSIS:  Open wound of abdominal wall 19-Feb-2023 15:16:40  Peirce Seaman  
POST-OP DIAGNOSIS:  Open wound of abdomen 21-Feb-2023 23:02:51  Vito Zepeda

## 2023-03-01 NOTE — CHART NOTE - NSCHARTNOTEFT_GEN_A_CORE
Post Operative Note    82y female w/ sbo, sigmoid mass w/ pneumoperitoneum    PREVIOUS OPERATIVE COURSE:  2/21: abd washout, abthera placed   2/19: abdominal washout, no further resection   2/16: Previous cecostomy site noted to be ischemic, omental patch repair, rest of bowel no ischemic changes, bowel edematous, abthera vac in place, abdomen open  2/14: s/p Sigmoid colectomy with ostomy, sigmoid mass resection for SBO/LBO.     Patient is now s/p skin closure with bridging vicryl mesh, vac placement, and PEG placement. Xerform, white foam sponge, and black foam sponge placed over Vicryl mesh. NGT and abthera vac removed during case. Per anesthesia pt was hypotensive on anesthesia induction, and received 3 pushes of marni during case. 2 U PRBC given due to Hgb 7.3 preop. Pt's vent settings unchanged following case.       OR STATS:   Time: 2 hours EBL: 5cc IVF: 2L Fluids Blood Products: 2 U PRBC UOP: 350  · Operative Findings: Open abdomen w/ matted bowel. stomach identified w/ palpation of NGT, peg placed. Skin closed w/ bridging vicryl mesh, vac placement       Objective:  Vitals: T(F): 98.7 (02-28-23 @ 20:00), Max: 98.7 (02-28-23 @ 20:00)  HR: 79 (02-28-23 @ 21:25)  BP: 168/72 (02-28-23 @ 20:00) (122/57 - 168/72)  RR: 26 (02-28-23 @ 21:25)  SpO2: 100% (02-28-23 @ 21:25)  Vent Settings: Mode: AC/ CMV (Assist Control/ Continuous Mandatory Ventilation), RR (machine): 10, TV (machine): 320, FiO2: 40, PEEP: 8, MAP: 11, PIP: 16    In:   02-27-23 @ 07:01  -  02-28-23 @ 07:00  --------------------------------------------------------  IN: 2794.9 mL    02-28-23 @ 07:01  -  03-01-23 @ 01:58  --------------------------------------------------------  IN: 2078 mL      IV Fluids: dextrose 5% + sodium chloride 0.45%. 1000 milliLiter(s) (90 mL/Hr) IV Continuous <Continuous>      Out:   02-27-23 @ 07:01  -  02-28-23 @ 07:00  --------------------------------------------------------  OUT: 2250 mL    02-28-23 @ 07:01  -  03-01-23 @ 01:58  --------------------------------------------------------  OUT: 920 mL      EBL:   02-28-23 @ 07:01  -  03-01-23 @ 01:58  --------------------------------------------------------  OUT: 0 mL      Voided Urine:   02-27-23 @ 07:01  -  02-28-23 @ 07:00  --------------------------------------------------------  OUT: 2250 mL    02-28-23 @ 07:01  -  03-01-23 @ 01:58  --------------------------------------------------------  OUT: 920 mL      Donis Catheter: yes no   Drains:   JESSICA:   02-27-23 @ 07:01  -  02-28-23 @ 07:00  --------------------------------------------------------  OUT: 475 mL    02-28-23 @ 07:01  -  03-01-23 @ 01:58  --------------------------------------------------------  OUT: 200 mL     ,   Chest Tube:      NG Tube:   02-28-23 @ 07:01  - 03-01-23 @ 01:58  --------------------------------------------------------  OUT: 100 mL        Physical Examination:  GCS: awake, not alert. Non-verbal. Grimaces to pain.   Lungs: CTAB.  Cardiovascular : S1, S2.  Regular rate and rhythm.  Pitting edema noted.  GI: Abdomen soft, mildly distended. Black sponge with overlying wound vac in place. Sutures around skin noted on L side.   Extremities: Extremities warm, pink, well-perfused.  Derm: Right upper extremity bandage in place due to weeping blisters.   : Donis in place.        Medications: [Standing]  acetaminophen     Tablet .. 1000 milliGRAM(s) Oral every 8 hours  amLODIPine   Tablet 5 milliGRAM(s) Oral daily  aspirin  chewable 81 milliGRAM(s) Oral daily  atorvastatin 80 milliGRAM(s) Oral at bedtime  cefiderocol IVPB 1500 milliGRAM(s) IV Intermittent every 8 hours  chlorhexidine 0.12% Liquid 15 milliLiter(s) Oral Mucosa every 12 hours  chlorhexidine 2% Cloths 1 Application(s) Topical <User Schedule>  dextrose 5% + sodium chloride 0.45%. 1000 milliLiter(s) IV Continuous <Continuous>  fentaNYL    Injectable 25 MICROGram(s) IV Push every 4 hours PRN  gabapentin 100 milliGRAM(s) Oral every 8 hours  heparin   Injectable 5000 Unit(s) SubCutaneous every 8 hours  naloxone Injectable 0.4 milliGRAM(s) IV Push once  ondansetron Injectable 4 milliGRAM(s) IV Push every 6 hours PRN  oxyCODONE    IR 2.5 milliGRAM(s) Oral every 4 hours PRN  oxyCODONE    IR 5 milliGRAM(s) Oral every 4 hours PRN  pantoprazole  Injectable 40 milliGRAM(s) IV Push every 24 hours    Medications: [PRN]  acetaminophen     Tablet .. 1000 milliGRAM(s) Oral every 8 hours  amLODIPine   Tablet 5 milliGRAM(s) Oral daily  aspirin  chewable 81 milliGRAM(s) Oral daily  atorvastatin 80 milliGRAM(s) Oral at bedtime  cefiderocol IVPB 1500 milliGRAM(s) IV Intermittent every 8 hours  chlorhexidine 0.12% Liquid 15 milliLiter(s) Oral Mucosa every 12 hours  chlorhexidine 2% Cloths 1 Application(s) Topical <User Schedule>  dextrose 5% + sodium chloride 0.45%. 1000 milliLiter(s) IV Continuous <Continuous>  fentaNYL    Injectable 25 MICROGram(s) IV Push every 4 hours PRN  gabapentin 100 milliGRAM(s) Oral every 8 hours  heparin   Injectable 5000 Unit(s) SubCutaneous every 8 hours  naloxone Injectable 0.4 milliGRAM(s) IV Push once  ondansetron Injectable 4 milliGRAM(s) IV Push every 6 hours PRN  oxyCODONE    IR 2.5 milliGRAM(s) Oral every 4 hours PRN  oxyCODONE    IR 5 milliGRAM(s) Oral every 4 hours PRN  pantoprazole  Injectable 40 milliGRAM(s) IV Push every 24 hours    Labs:                        7.3    17.33 )-----------( 626      ( 28 Feb 2023 11:37 )             24.4     02-28    139  |  111<H>  |  14  ----------------------------<  116<H>  3.8   |  24  |  0.5<L>    Ca    5.6<LL>      28 Feb 2023 21:26  Phos  2.6     02-28  Mg     1.9     02-28    TPro  4.0<L>  /  Alb  1.8<L>  /  TBili  0.2  /  DBili  <0.2  /  AST  29  /  ALT  31  /  AlkPhos  191<H>  02-27    PT/INR - ( 27 Feb 2023 06:00 )   PT: 10.70 sec;   INR: 0.94 ratio         PTT - ( 27 Feb 2023 06:00 )  PTT:19.6 sec        Imaging:  No post-op imaging studies    Assessment:  82yFemale patient S/P ELLEN, RUBIO  s/p skin closure with bridging vicryl mesh, vac placement, and PEG placement.    Plan:  - Monitor post-op labs and replete as necessary  -Continue monitoring ostomy output  -Hold tube feeds; f/u with primary team in AM regarding restarting feeds   -F/u primary team regarding tracheostomy      Date/Time: 03-01-23 @ 01:58

## 2023-03-01 NOTE — BRIEF OPERATIVE NOTE - ANTIBIOTIC PROTOCOL
bedside report given to OSCAR HYDE
Followed protocol

## 2023-03-01 NOTE — BRIEF OPERATIVE NOTE - NSICDXBRIEFPREOP_GEN_ALL_CORE_FT
PRE-OP DIAGNOSIS:  SBO (small bowel obstruction) 14-Feb-2023 05:59:35  Vito Zepeda  
PRE-OP DIAGNOSIS:  Open wound of abdominal wall 19-Feb-2023 15:16:35  Pierce Seaman  
PRE-OP DIAGNOSIS:  Open wound of abdomen 21-Feb-2023 23:02:46  Vito Zepeda  
PRE-OP DIAGNOSIS:  Open wound of abdominal wall 19-Feb-2023 15:16:35  Pierce Seaman  
PRE-OP DIAGNOSIS:  SBO (small bowel obstruction) 14-Feb-2023 05:59:35  Vito Zepeda

## 2023-03-01 NOTE — PROGRESS NOTE ADULT - ASSESSMENT
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,4,5 digit numbness) on aspirin and no other antiplatelet/anticoagulant agents,  GERD, HTN, HLD, chronic UTIs, pasty cholecystectomy(>20yrs ago), hysterectomy (>45yrs ago), and bladder lift (>30yrs ago) presented to the ED with SBO s/p diagnostic lap converted to exploratory laparotomy, sigmoidectomy, cecal enterotomy w/ stool expression and closure, descending colostomy, and abthera placement    - on TPN previously, d/c 2/24  - obesity, BMI 35.3  - at high risk for malnutrition and surgical complications in absence of adequate nutrition support  - s/p code stroke with CTH showing acute infarct/ischemic injury    PLAN  - when feeds resumed give Peptamen Intense VHP at 40ml/hr   - monitor lytes, correct phos to > 3.5  - monitor ostomy output  - will follow.

## 2023-03-01 NOTE — BRIEF OPERATIVE NOTE - OPERATION/FINDINGS
Open abdomen w/ matted bowel. stomach identified w/ palpation of NGT, peg placed. Skin closed w/ bridging vicryl mesh, vac placement

## 2023-03-01 NOTE — PROGRESS NOTE ADULT - ASSESSMENT
Assessment and Plan:   82y female w/ sbo, sigmoid mass w/ pneumoperitoneum    2/28: bridging Vicryl mesh closure and vac placemnet   2/21: abd washout, abthera placed   2/19: abdominal washout, no further resection   2/16: Previous cecostomy site noted to be ischemic, omental patch repair, rest of bowel no ischemic changes, bowel edematous, abthera vac in place, abdomen open  2/14: s/p Sigmoid colectomy with ostomy, sigmoid mass resection for SBO/LBO.     NEURO:  #Acute pain    -APAP IV prn    -Fentanyl 25 mcg prn q4  #Acute sedation    - not on any sedation currently  #Code stroke 2/23   -CTH revealed patchy hypodensities in bilateral thalami and bilateral occipitotemporal regions like representing acute infarct/ischemic injury. No intracranial hemorrhage or midline shift.   -CTA: no LVO/AVM, left vertebral artery stenosis.  - 2nd CTH completed > no significant change in edema involving b/l thalamic and occipital lobes   -EEG: mild- mod generalized slowing, bilateral focal slowing over posterior quadrants with shifting asymmetry, R>L, borderline independent left frontal focal slowing,   - MRI 2/27: Diffusely abnormal signal and leptomeningeal enhancement involving the bilateral occipital, temporal, thalami, cerebellar white matter as well as the splenium of the corpus callosum in concert findings favor subacute ischemic etiology given predominant involvement of the posterior circulation though other etiologies including meningitis but, toxic metabolic or neoplasm cannot entirely be excluded. Correlation with CSF analysis is recommended. Moderate chronic microvascular type changes as well as a chronic infarcts involving the right parietal lobe and bilateral cerebellar hemispheres.      RESP:   #Acute Respiratory Failure  RR (machine): 10, TV (machine): 320, FiO2: 40, PEEP: 8  -ET  22cm  -Daily CXR and ABG while intubated  - SAT/SBT per protocol    CARDS:   #hypotension 2/2 hypovolemic shock-resolved  -Prev on levophed, off since 2/20  -#Imaging/labs  Echo 2/18:  elevated LA pressures, Lv not well visualized suspect normal lv function, suggest lumason, distal anteroseptal hypokinesis, sclerotic Aov  Echo 2/27: LVEF 50-55%. Multiple regional wall motion abnormalities. ,noclots  -PVCs--> EKG  -previous trop neg x 3  - C/s Cardiology Recs 2/23: TTE, complete full stroke workup, reconsult if needed     GI/NUTR:   2/28: bridging Vicryl mesh closure and vac placement; Gtube placement   2/21: abd washout, abthera placed   2/19: abdominal washout, no further resection   2/16: Previous cecostomy site noted to be ischemic, omental patch repair, rest of bowel no ischemic changes, bowel edematous, abthera vac in place, abdomen open  2/14: s/p Sigmoid colectomy with ostomy, sigmoid mass resection for SBO/LBO.   -#Diet  -NPO   -Off TPN now   #GI Prophylaxis    -Pantoprazole  #Bowel regimen    -HOLDING    /RENAL:   #urine output in critically ill    -indwelling rodriguez (placed 2/14/2023)  #GERA  (baseline Cr 0.8) > resolved     -peak 1.4  #urine output in critically ill  Monitor UO-rodriguez in place  Volume Status:  02-27-23 @ 07:01  -  02-28-23 @ 07:00  --------------------------------------------------------  IN: 2794.9 mL / OUT: 2250 mL / NET: 544.9 mL    02-28-23 @ 07:01  -  03-01-23 @ 03:52  --------------------------------------------------------  IN: 2628 mL / OUT: 1395 mL / NET: 1233 mL  Labs:          BUN/Cr- 16/0.5  -->,  14/0.5  -->,  13/0.5  -->          Electrolytes-Na 139 // K 4.0 // Mg 2.0 //  Phos 2.9 (03-01 @ 02:13)  Daily BMP with electrolyte repletion  Appreciate strict I/Os    HEME/ONC:   Holding anticoagulation at this time, ASA 81   #DVT ppx: SCDs, HSQ  DVT propylaxis-heparin   Injectable  Hb/Hct:  7.4/24.3  -->,  7.3/24.4  -->,  11.8/36.4  -->  Plts:  571  -->,  626  -->,  599  -->    PTT/INR:   T&S:ABO RH Interpretation:  (02-27)  T&S expires: 3/2    ID:  WBC- 18.63  --->>,  17.33  --->>,  16.47  --->>  Temp trend- 24hrs T(F): 95.5 (03-01 @ 03:00), Max: 98.7 (02-28 @ 20:00)  Antibiotics-cefiderocol IVPB 1500 every 8 hours  ID following: Abx for a total of 14 days since 2/19    ENDO:    -FSG q6 while NPO    -Glucose goal 140-180    -if above 180 start ISS       LINES/DRAINS:  PIV, Rodriguez, ETT, Abdominal abthera vac, NGT. LICARLYLE, BROOKE midline    ADVANCED DIRECTIVES:  Full Code    INDICATION FOR SICU: HEMODYNAMIC MONITORING POSTOPERATIVE REQUIRING VASOPRESSORS AND MECHANICAL VENTILATION     Dispo: SICU. Discussed with covering surgical attending Assessment and Plan:   82y female w/ sbo, sigmoid mass w/ pneumoperitoneum    2/28: bridging Vicryl mesh closure and vac placemnet   2/21: abd washout, abthera placed   2/19: abdominal washout, no further resection   2/16: Previous cecostomy site noted to be ischemic, omental patch repair, rest of bowel no ischemic changes, bowel edematous, abthera vac in place, abdomen open  2/14: s/p Sigmoid colectomy with ostomy, sigmoid mass resection for SBO/LBO.     NEURO:  #Acute pain    -APAP IV prn    -Fentanyl 25 mcg prn q4  #Acute sedation    - not on any sedation currently  #Code stroke 2/23   -CTH revealed patchy hypodensities in bilateral thalami and bilateral occipitotemporal regions like representing acute infarct/ischemic injury. No intracranial hemorrhage or midline shift.   -CTA: no LVO/AVM, left vertebral artery stenosis.  - 2nd CTH completed > no significant change in edema involving b/l thalamic and occipital lobes   -EEG: mild- mod generalized slowing, bilateral focal slowing over posterior quadrants with shifting asymmetry, R>L, borderline independent left frontal focal slowing,   - MRI 2/27: Diffusely abnormal signal and leptomeningeal enhancement involving the bilateral occipital, temporal, thalami, cerebellar white matter as well as the splenium of the corpus callosum in concert findings favor subacute ischemic etiology given predominant involvement of the posterior circulation though other etiologies including meningitis but, toxic metabolic or neoplasm cannot entirely be excluded. Correlation with CSF analysis is recommended. Moderate chronic microvascular type changes as well as a chronic infarcts involving the right parietal lobe and bilateral cerebellar hemispheres.      RESP:   #Acute Respiratory Failure  # Left mucus plug s/p bedside bronchoscopy with lavage 2/28  RR (machine): 10, TV (machine): 320, FiO2: 40, PEEP: 8  -ET  22cm  -Daily CXR and ABG while intubated  - SAT/SBT per protocol    CARDS:   #hypotension 2/2 hypovolemic shock-resolved  -off pressors since 2/20  -#Imaging/labs  Echo 2/18:  elevated LA pressures, Lv not well visualized suspect normal lv function, suggest lumason, distal anteroseptal hypokinesis, sclerotic Aov  Echo 2/27: LVEF 50-55%. Multiple regional wall motion abnormalities. ,noclots  -PVCs--> EKG  -previous trop neg x 3  - C/s Cardiology Recs 2/23: TTE, complete full stroke workup, reconsult if needed     GI/NUTR:   2/28: bridging Vicryl mesh closure and midline wound vac; G-tube placement   2/21: abd washout, abthera placed   2/19: abdominal washout, no further resection   2/16: Previous cecostomy site noted to be ischemic, omental patch repair, rest of bowel no ischemic changes, bowel edematous, abthera vac in place, abdomen open  2/14: s/p Sigmoid colectomy with ostomy, sigmoid mass resection for SBO/LBO.   -#Diet  -NPO   -Off TPN now   #GI Prophylaxis    -Pantoprazole  #Bowel regimen    -Brown liquid stool in ostomy    /RENAL:   #urine output in critically ill    -indwelling rodriguez (placed 2/14/2023)  #GERA  (baseline Cr 0.8) > resolved     -peak 1.4  #urine output in critically ill  Monitor UO-rodriguez in place  Volume Status:  OUT:    Colostomy (mL): 15 mL    Indwelling Catheter - Urethral (mL): 1255 mL    Nasogastric/Oral tube (mL): 100 mL    VAC (Vacuum Assisted Closure) System (mL): 200 mL  Total OUT: 1570 mL  Total NET: 1488 mL  Labs:          BUN/Cr- 16/0.5  -->,  14/0.5  -->,  13/0.5  -->          Electrolytes-Na 139 // K 4.0 // Mg 2.0 //  Phos 2.9 (03-01 @ 02:13)  Daily BMP with electrolyte repletion  Appreciate strict I/Os    HEME/ONC:   Holding anticoagulation at this time, ASA 81   #DVT ppx: SCDs, HSQ  DVT propylaxis-heparin   Injectable  Hb/Hct:  7.4/24.3  -->,  7.3/24.4  -->,  11.8/36.4  --> s/p 3 unit pRBC 2/28  Plts:  571  -->,  626  -->,  599  -->    PTT/INR:   T&S:ABO RH Interpretation:  (02-27)  T&S expires: 3/2    ID:  WBC- 18.63  --->>,  17.33  --->>,  16.47  --->>  Temp trend- 24hrs T(F): 96.5 (03-01 @ 04:00), Max: 98.7 (02-28 @ 20:00)  Antibiotics-cefiderocol IVPB 1500 every 8 hours  Follow up Bronchial cultures  ID following: Abx for a total of 14 days since 2/19    ENDO:    -FSG q6 while NPO    -Glucose goal 140-180    -if above 180 start ISS       LINES/DRAINS:  PIV, Rodriguez, ETT, Abdominal wound vac, LIJ, LUE midline. PEG    ADVANCED DIRECTIVES:  Full Code    INDICATION FOR SICU: HEMODYNAMIC MONITORING POSTOPERATIVE REQUIRING VASOPRESSORS AND MECHANICAL VENTILATION     Dispo: SICU. Discussed with covering surgical attending

## 2023-03-01 NOTE — PROGRESS NOTE ADULT - SUBJECTIVE AND OBJECTIVE BOX
SURGERY PROGRESS NOTE     Patient: RUBIO HUGHES , 82y ([birth year])Female   MRN: [id number]  Location: [address]  Visit: 02-10-23 Inpatient  Date: 03-01-23 @ 08:22       Events of past 24 hours:    s/p abdominal closure; received 2U intraop  Patient seen resting in bed.    PAST MEDICAL & SURGICAL HISTORY:  Atrial fibrillation      Hypertension      CVA (cerebrovascular accident)      HLD (hyperlipidemia)      GERD (gastroesophageal reflux disease)      History of cholecystectomy      History of hysterectomy      History of bladder suspension procedure           Vitals:   T(F): 96.5 (03-01-23 @ 04:00), Max: 98.7 (02-28-23 @ 20:00)  HR: 94 (03-01-23 @ 07:00)  BP: 144/64 (03-01-23 @ 07:00)  RR: 25 (03-01-23 @ 07:00)  SpO2: 99% (03-01-23 @ 07:00)  Mode: AC/ CMV (Assist Control/ Continuous Mandatory Ventilation), RR (machine): 10, TV (machine): 320, FiO2: 40, PEEP: 8, ITime: 1, MAP: 10, PIP: 22    Diet, NPO:   Except Medications      Fluids:     I & O's:    02-28-23 @ 07:01  -  03-01-23 @ 07:00  --------------------------------------------------------  IN:    dextrose 5% + sodium chloride 0.45%: 2070 mL    IV PiggyBack: 300 mL    IV PiggyBack: 500 mL    PRBCs (Packed Red Blood Cells): 288 mL  Total IN: 3158 mL    OUT:    Colostomy (mL): 15 mL    Indwelling Catheter - Urethral (mL): 1255 mL    Nasogastric/Oral tube (mL): 100 mL    Other (mL): 200 mL    VAC (Vacuum Assisted Closure) System (mL): 200 mL  Total OUT: 1770 mL    Total NET: 1388 mL     PHYSICAL EXAM:     Neuro: awake, not alert. Non-verbal. Grimaces to pain applied to the upper extremities  Resp: Intubated, ventilated  Cards:  S1, S2.  Regular rate and rhythm.  Peripheral edema +++ pitting  GI: Abdomen soft, Non-tender, Ostomy is pink with stool and air in ostomy bag. Midline wound vac in place  Extremities: Extremities warm, pink, well-perfused.  Derm: Poor skin integrity- large skin tear/ degloving of right upper extremity    :       Donis catheter in place.- strict I/Os    MEDICATIONS  (STANDING):  acetaminophen     Tablet .. 1000 milliGRAM(s) Oral every 8 hours  amLODIPine   Tablet 5 milliGRAM(s) Oral daily  aspirin  chewable 81 milliGRAM(s) Oral daily  atorvastatin 80 milliGRAM(s) Oral at bedtime  cefiderocol IVPB 1500 milliGRAM(s) IV Intermittent every 8 hours  chlorhexidine 0.12% Liquid 15 milliLiter(s) Oral Mucosa every 12 hours  chlorhexidine 2% Cloths 1 Application(s) Topical <User Schedule>  dextrose 5% + sodium chloride 0.45%. 1000 milliLiter(s) (90 mL/Hr) IV Continuous <Continuous>  gabapentin 100 milliGRAM(s) Oral every 8 hours  heparin   Injectable 5000 Unit(s) SubCutaneous every 8 hours  naloxone Injectable 0.4 milliGRAM(s) IV Push once  pantoprazole  Injectable 40 milliGRAM(s) IV Push every 24 hours    MEDICATIONS  (PRN):  fentaNYL    Injectable 25 MICROGram(s) IV Push every 4 hours PRN AGITATION/TACHYPNEA  ondansetron Injectable 4 milliGRAM(s) IV Push every 6 hours PRN Nausea and/or Vomiting  oxyCODONE    IR 2.5 milliGRAM(s) Oral every 4 hours PRN Moderate Pain (4 - 6)  oxyCODONE    IR 5 milliGRAM(s) Oral every 4 hours PRN Severe Pain (7 - 10)      DVT PROPHYLAXIS: heparin   Injectable 5000 Unit(s) SubCutaneous every 8 hours    GI PROPHYLAXIS: pantoprazole  Injectable 40 milliGRAM(s) IV Push every 24 hours    ANTICOAGULATION:   ANTIBIOTICS:  cefiderocol IVPB 1500 milliGRAM(s)            LAB/STUDIES:  Labs:  CAPILLARY BLOOD GLUCOSE      POCT Blood Glucose.: 101 mg/dL (28 Feb 2023 17:04)  POCT Blood Glucose.: 128 mg/dL (28 Feb 2023 11:48)                          11.8   16.47 )-----------( 599      ( 01 Mar 2023 02:13 )             36.4       Auto Neutrophil %: 88.3 % (03-01-23 @ 02:13)  Auto Immature Granulocyte %: 2.1 % (03-01-23 @ 02:13)  Auto Neutrophil %: 86.9 % (02-28-23 @ 11:37)  Auto Immature Granulocyte %: 1.7 % (02-28-23 @ 11:37)    03-01    139  |  108  |  13  ----------------------------<  128<H>  4.0   |  22  |  0.5<L>      Calcium, Total Serum: 6.6 mg/dL (03-01-23 @ 02:13)      LFTs:     Blood Gas Arterial, Lactate: 0.40 mmol/L (02-27-23 @ 11:42)  Blood Gas Arterial, Lactate: 0.60 mmol/L (02-27-23 @ 03:41)    ABG - ( 27 Feb 2023 11:42 )  pH: 7.46  /  pCO2: 36    /  pO2: 91    / HCO3: 26    / Base Excess: 1.7   /  SaO2: 99.4            ABG - ( 27 Feb 2023 03:41 )  pH: 7.50  /  pCO2: 32    /  pO2: 189   / HCO3: 25    / Base Excess: 1.8   /  SaO2: 100.0           ABG - ( 26 Feb 2023 03:26 )  pH: 7.54  /  pCO2: 31    /  pO2: 156   / HCO3: 26    / Base Excess: 3.9   /  SaO2: 100.0             Coags:                Culture - Bronchial (collected 28 Feb 2023 09:00)  Source: .Bronchial None  Gram Stain (28 Feb 2023 20:49):    Few polymorphonuclear leukocytes seen per low power field    No Squamous epithelial cells seen per low power field    Rare Gram Negative Rods seen per oil power field

## 2023-03-01 NOTE — PROGRESS NOTE ADULT - SUBJECTIVE AND OBJECTIVE BOX
NUTRITION SUPPORT TEAM  -  PROGRESS NOTE     82y female w/ sbo, sigmoid mass w/ pneumoperitoneum    2/28: bridging Vicryl mesh closure and vac placemnet   2/21: abd washout, abthera placed   2/19: abdominal washout, no further resection   2/16: Previous cecostomy site noted to be ischemic, omental patch repair, rest of bowel no ischemic changes, bowel edematous, abthera vac in place, abdomen open  2/14: s/p Sigmoid colectomy with ostomy, sigmoid mass resection for SBO/LBO.     Interval Events:  -OR for bridging Vicryl mesh closure and midline wound vac; G-tube placement   -2 U given in OR--> Hgb 11.8  -phos repleted    pt seen this morning in SICU - intubated, sedated, can be wakened,   abd soft, distended, + ostomy with gas, midline wound vac  off TPN and had been receiving tube feeds p/t yesterday surgery, NPO this morning    VITALS:  T(F): 97.3 (03-01 @ 20:00), Max: 99 (03-01 @ 12:00)  HR: 86 (03-01 @ 20:00) (80 - 110)  BP: 154/69 (03-01 @ 20:00) (123/60 - 159/77)  RR: 22 (03-01 @ 20:00) (20 - 25)  SpO2: 100% (03-01 @ 20:00) (97% - 100%)    HEIGHT/WEIGHT/BMI:   Height (cm): 152.4 (02-28)  Weight (kg): 82.1 (02-28)  BMI (kg/m2): 35.3 (02-28)    I/Os:     02-28-23 @ 07:01  -  03-01-23 @ 07:00  --------------------------------------------------------  IN:    dextrose 5% + sodium chloride 0.45%: 2070 mL    IV PiggyBack: 300 mL    IV PiggyBack: 500 mL    PRBCs (Packed Red Blood Cells): 288 mL  Total IN: 3158 mL    OUT:    Colostomy (mL): 15 mL    Indwelling Catheter - Urethral (mL): 1255 mL    Nasogastric/Oral tube (mL): 100 mL    Other (mL): 200 mL    VAC (Vacuum Assisted Closure) System (mL): 200 mL  Total OUT: 1770 mL    Total NET: 1388 mL    STANDING MEDICATIONS:   acetaminophen     Tablet .. 1000 milliGRAM(s) Oral every 8 hours  amLODIPine   Tablet 5 milliGRAM(s) Oral daily  aspirin  chewable 81 milliGRAM(s) Oral daily  atorvastatin 80 milliGRAM(s) Oral at bedtime  cefiderocol IVPB 1500 milliGRAM(s) IV Intermittent every 8 hours  chlorhexidine 0.12% Liquid 15 milliLiter(s) Oral Mucosa every 12 hours  chlorhexidine 2% Cloths 1 Application(s) Topical <User Schedule>  dextrose 5% + sodium chloride 0.45%. 1000 milliLiter(s) IV Continuous <Continuous>  gabapentin 100 milliGRAM(s) Oral every 8 hours  heparin   Injectable 5000 Unit(s) SubCutaneous every 8 hours  naloxone Injectable 0.4 milliGRAM(s) IV Push once  pantoprazole  Injectable 40 milliGRAM(s) IV Push every 24 hours    LABS:                         11.8   16.47 )-----------( 599      ( 01 Mar 2023 02:13 )             36.4     139  |  108  |  13  ----------------------------<  128<H>          (03-01-23 @ 02:13)  4.0   |  22  |  0.5<L>    Ca    6.6<L>          (03-01-23 @ 02:13)  Phos  2.9         (03-01-23 @ 02:13)  Mg     2.0         (03-01-23 @ 02:13)    Triglycerides, Serum: 196 mg/dL (02-17 @ 09:21)    DIET:   Diet, NPO with Tube Feed:   Tube Feeding Modality: Gastrostomy  Peptomen Intense VHP  Total Volume for 24 Hours (mL): 960  Continuous  Starting Tube Feed Rate mL per Hour: 10  Increase Tube Feed Rate by (mL): 10     Every 4 hours  Until Goal Tube Feed Rate (mL per Hour): 40  Tube Feed Duration (in Hours): 24  Tube Feed Start Time: 10:30 (03-01-23 @ 08:27) [Active]

## 2023-03-01 NOTE — PROGRESS NOTE ADULT - ASSESSMENT
82F PMHx CVA 2017, HTN, s/p edson, s/p hysterectomy here with abd pain, found to have pneumatosis intestinalis, SBO. Code stroke.    IMP  #Pneumatosis intestinalis, SBO; due to colon mass    ct abd with pneumatosis intestinalis of cecum, ascending colon; sbo    s/p ex lap with resection 2/14; +colon mass; colostomy created    s/p second look lap 2/16    s/p closure 2/19    s/p washout 2/21    s/p closure with peg 3/1  #Code stroke    cth with bl thalami, occipitotemporal hypodensities    cta with severe stenosis L vert    mri with leptomeningeal enhancement, abnl signal diffuse    veeg +interictal activity 2/24  #Kleb pna    dta cx  kleb 2/15    bcx ntd  #L lung whiteout    resolved s/p bronch 2/28  #CVA  #HTN    PLAN  f/u neuro re: need for lp if in line with goc  vent per sicu  alvaro per id    Hellen  [id number]

## 2023-03-01 NOTE — PROGRESS NOTE ADULT - SUBJECTIVE AND OBJECTIVE BOX
RUBIO HUGHES  [id number]  82y Female    Indication for ICU admission: open abdomen s/p bowel resection for SBO  Admit Date: 2/9  ICU Date:  2/14  OR Date: 2/14, 2/16    No Known Allergies    PAST MEDICAL & SURGICAL HISTORY:  Atrial fibrillation  Hypertension  CVA (cerebrovascular accident)  HLD (hyperlipidemia)  GERD (gastroesophageal reflux disease)  History of cholecystectomy  History of hysterectomy  History of bladder suspension procedure    24HRS EVENT:  2/28  NIGHT  -s/p skin closure with binding vicryl mesh, fascia left open. NGT and abthera removed during case.   -2 U given in OR--> Hgb 11.8  -phos repleted    DAY  -White out L lung, consent obtained for bronch from family  BAL   Bronchcopious tan secretions  Follow up xr improved  - 1u pRBC tx  - Pending RTOR      REVIEW OF SYSTEMS  [ ] A ten-point review of systems was otherwise negative except as noted.  [ x] Due to altered mental status/intubation, subjective information were not able to be obtained from the patient. History was obtained, to the extent possible, from review of the chart and collateral sources of information  ********************************************************************************************************   RUBIO HUGHES  [id number]  82y Female    Indication for ICU admission: open abdomen s/p bowel resection for SBO  Admit Date: 2/9  ICU Date:  2/14  OR Date: 2/14, 2/16    No Known Allergies    PAST MEDICAL & SURGICAL HISTORY:  Atrial fibrillation  Hypertension  CVA (cerebrovascular accident)  HLD (hyperlipidemia)  GERD (gastroesophageal reflux disease)  History of cholecystectomy  History of hysterectomy  History of bladder suspension procedure    24HRS EVENT:  2/28  NIGHT  -OR for bridging Vicryl mesh closure and midline wound vac; G-tube placement   -2 U given in OR--> Hgb 11.8  -phos repleted    DAY  -White out L lung, consent obtained for bronch from family  BAL   Bronchcopious tan secretions  Follow up xr improved  - 1u pRBC tx  - Pending RTOR      REVIEW OF SYSTEMS  [ ] A ten-point review of systems was otherwise negative except as noted.  [ x] Due to altered mental status/intubation, subjective information were not able to be obtained from the patient. History was obtained, to the extent possible, from review of the chart and collateral sources of information  ********************************************************************************************************  Daily Height in cm: 152.4 (28 Feb 2023 20:57)    Daily     Diet, NPO:   Except Medications (02-27-23 @ 18:30)      CURRENT MEDS:  Neurologic Medications  acetaminophen     Tablet .. 1000 milliGRAM(s) Oral every 8 hours  fentaNYL    Injectable 25 MICROGram(s) IV Push every 4 hours PRN AGITATION/TACHYPNEA  gabapentin 100 milliGRAM(s) Oral every 8 hours  ondansetron Injectable 4 milliGRAM(s) IV Push every 6 hours PRN Nausea and/or Vomiting  oxyCODONE    IR 2.5 milliGRAM(s) Oral every 4 hours PRN Moderate Pain (4 - 6)  oxyCODONE    IR 5 milliGRAM(s) Oral every 4 hours PRN Severe Pain (7 - 10)    Respiratory Medications    Cardiovascular Medications  amLODIPine   Tablet 5 milliGRAM(s) Oral daily    Gastrointestinal Medications  dextrose 5% + sodium chloride 0.45%. 1000 milliLiter(s) IV Continuous <Continuous>  pantoprazole  Injectable 40 milliGRAM(s) IV Push every 24 hours    Genitourinary Medications    Hematologic/Oncologic Medications  aspirin  chewable 81 milliGRAM(s) Oral daily  heparin   Injectable 5000 Unit(s) SubCutaneous every 8 hours    Antimicrobial/Immunologic Medications  cefiderocol IVPB 1500 milliGRAM(s) IV Intermittent every 8 hours    Endocrine/Metabolic Medications  atorvastatin 80 milliGRAM(s) Oral at bedtime    Topical/Other Medications  chlorhexidine 0.12% Liquid 15 milliLiter(s) Oral Mucosa every 12 hours  chlorhexidine 2% Cloths 1 Application(s) Topical <User Schedule>  naloxone Injectable 0.4 milliGRAM(s) IV Push once      ICU Vital Signs Last 24 Hrs  T(C): 35.8 (01 Mar 2023 04:00), Max: 37.1 (28 Feb 2023 20:00)  T(F): 96.5 (01 Mar 2023 04:00), Max: 98.7 (28 Feb 2023 20:00)  HR: 94 (01 Mar 2023 07:00) (79 - 95)  BP: 144/64 (01 Mar 2023 07:00) (122/57 - 200/78)  BP(mean): 92 (01 Mar 2023 07:00) (84 - 112)  ABP: --  ABP(mean): --  RR: 25 (01 Mar 2023 07:00) (6 - 29)  SpO2: 99% (01 Mar 2023 07:00) (96% - 100%)    O2 Parameters below as of 28 Feb 2023 20:57    O2 Flow (L/min): 40  O2 Concentration (%): 40    Mode: AC/ CMV (Assist Control/ Continuous Mandatory Ventilation)  RR (machine): 10  TV (machine): 320  FiO2: 40  PEEP: 8  ITime: 1  MAP: 10  PIP: 22    ABG - ( 27 Feb 2023 11:42 )  pH, Arterial: 7.46  pH, Blood: x     /  pCO2: 36    /  pO2: 91    / HCO3: 26    / Base Excess: 1.7   /  SaO2: 99.4      I&O's Summary    28 Feb 2023 07:01  -  01 Mar 2023 07:00  --------------------------------------------------------  IN: 3058 mL / OUT: 1570 mL / NET: 1488 mL      I&O's Detail    28 Feb 2023 07:01  -  01 Mar 2023 07:00  --------------------------------------------------------  IN:    dextrose 5% + sodium chloride 0.45%: 2070 mL    IV PiggyBack: 200 mL    IV PiggyBack: 500 mL    PRBCs (Packed Red Blood Cells): 288 mL  Total IN: 3058 mL    OUT:    Colostomy (mL): 15 mL    Indwelling Catheter - Urethral (mL): 1255 mL    Nasogastric/Oral tube (mL): 100 mL    VAC (Vacuum Assisted Closure) System (mL): 200 mL  Total OUT: 1570 mL    Total NET: 1488 mL    PHYSICAL EXAM:  RASS:   0          GCS:  8T   = E 4  / V 1T  / M    3  awake, not alert. Non-verbal  Grimaces to pain applied to the upper extremities  slight upward flexion of toes of right foot with pain to plantar surface      Lungs:  Intubated, ventilated  No respiratory distress    Cardiovascular : S1, S2.  Regular rate and rhythm.  Peripheral edema +++ pitting  Cardiac Rhythm: Normal Sinus Rhythm    GI: Abdomen soft, Non-tender,   Ostomy is pink with stool and air in ostomy bag   Wound: Midline wound vac in place    Extremities: Extremities warm, pink, well-perfused.    Derm: Poor skin integrity- large skin tear/ degloving of right upper extremity      :       Donis catheter in place.- strict I/Os      LABS:  CAPILLARY BLOOD GLUCOSE  POCT Blood Glucose.: 101 mg/dL (28 Feb 2023 17:04)  POCT Blood Glucose.: 128 mg/dL (28 Feb 2023 11:48)                      11.8   16.47 )-----------( 599      ( 01 Mar 2023 02:13 )             36.4       03-01    139  |  108  |  13  ----------------------------<  128<H>  4.0   |  22  |  0.5<L>    Ca    6.6<L>      01 Mar 2023 02:13  Phos  2.9     03-01  Mg     2.0     03-01    Culture - Bronchial (collected 28 Feb 2023 09:00)  Source: .Bronchial None  Gram Stain (28 Feb 2023 20:49):    Few polymorphonuclear leukocytes seen per low power field    No Squamous epithelial cells seen per low power field    Rare Gram Negative Rods seen per oil power field

## 2023-03-01 NOTE — PROGRESS NOTE ADULT - SUBJECTIVE AND OBJECTIVE BOX
INTERVAL HPI/OVERNIGHT EVENTS:    SUBJECTIVE: Patient seen and examined at bedside.     cc: abd pain  unable to obtain ros  OBJECTIVE:    VITAL SIGNS:  Vital Signs Last 24 Hrs  T(C): 37.2 (01 Mar 2023 12:00), Max: 37.2 (01 Mar 2023 12:00)  T(F): 99 (01 Mar 2023 12:00), Max: 99 (01 Mar 2023 12:00)  HR: 85 (01 Mar 2023 15:00) (79 - 110)  BP: 128/59 (01 Mar 2023 15:00) (120/59 - 200/78)  BP(mean): 85 (01 Mar 2023 15:00) (85 - 112)  RR: 21 (01 Mar 2023 15:00) (6 - 27)  SpO2: 100% (01 Mar 2023 15:00) (96% - 100%)    Parameters below as of 01 Mar 2023 08:00  Patient On (Oxygen Delivery Method): ventilator    O2 Concentration (%): 40      PHYSICAL EXAM:    General: intubated  HEENT: NC/AT; PERRL, clear conjunctiva  Neck: supple  Respiratory: CTA b/l  Cardiovascular: +S1/S2; RRR  Abdomen: soft, NT/ND; +BS x4  Extremities: WWP, 2+ peripheral pulses b/l; no LE edema  Skin: normal color and turgor; no rash  Neurological:    MEDICATIONS:  MEDICATIONS  (STANDING):  acetaminophen     Tablet .. 1000 milliGRAM(s) Oral every 8 hours  amLODIPine   Tablet 5 milliGRAM(s) Oral daily  aspirin  chewable 81 milliGRAM(s) Oral daily  atorvastatin 80 milliGRAM(s) Oral at bedtime  cefiderocol IVPB 1500 milliGRAM(s) IV Intermittent every 8 hours  chlorhexidine 0.12% Liquid 15 milliLiter(s) Oral Mucosa every 12 hours  chlorhexidine 2% Cloths 1 Application(s) Topical <User Schedule>  dextrose 5% + sodium chloride 0.45%. 1000 milliLiter(s) (90 mL/Hr) IV Continuous <Continuous>  gabapentin 100 milliGRAM(s) Oral every 8 hours  heparin   Injectable 5000 Unit(s) SubCutaneous every 8 hours  naloxone Injectable 0.4 milliGRAM(s) IV Push once  pantoprazole  Injectable 40 milliGRAM(s) IV Push every 24 hours    MEDICATIONS  (PRN):  fentaNYL    Injectable 25 MICROGram(s) IV Push every 4 hours PRN AGITATION/TACHYPNEA  ondansetron Injectable 4 milliGRAM(s) IV Push every 6 hours PRN Nausea and/or Vomiting  oxyCODONE    IR 2.5 milliGRAM(s) Oral every 4 hours PRN Moderate Pain (4 - 6)  oxyCODONE    IR 5 milliGRAM(s) Oral every 4 hours PRN Severe Pain (7 - 10)      ALLERGIES:  Allergies    No Known Allergies    Intolerances        LABS:                        11.8   16.47 )-----------( 599      ( 01 Mar 2023 02:13 )             36.4     Hemoglobin: 11.8 g/dL (03-01 @ 02:13)  Hemoglobin: 7.3 g/dL (02-28 @ 11:37)  Hemoglobin: 7.4 g/dL (02-28 @ 05:32)  Hemoglobin: 6.7 g/dL (02-28 @ 04:55)  Hemoglobin: 7.6 g/dL (02-27 @ 06:00)    CBC Full  -  ( 01 Mar 2023 02:13 )  WBC Count : 16.47 K/uL  RBC Count : 4.15 M/uL  Hemoglobin : 11.8 g/dL  Hematocrit : 36.4 %  Platelet Count - Automated : 599 K/uL  Mean Cell Volume : 87.7 fL  Mean Cell Hemoglobin : 28.4 pg  Mean Cell Hemoglobin Concentration : 32.4 g/dL  Auto Neutrophil # : 14.53 K/uL  Auto Lymphocyte # : 0.73 K/uL  Auto Monocyte # : 0.65 K/uL  Auto Eosinophil # : 0.15 K/uL  Auto Basophil # : 0.07 K/uL  Auto Neutrophil % : 88.3 %  Auto Lymphocyte % : 4.4 %  Auto Monocyte % : 3.9 %  Auto Eosinophil % : 0.9 %  Auto Basophil % : 0.4 %    03-01    139  |  108  |  13  ----------------------------<  128<H>  4.0   |  22  |  0.5<L>    Ca    6.6<L>      01 Mar 2023 02:13  Phos  2.9     03-01  Mg     2.0     03-01      Creatinine Trend: 0.5<--, 0.5<--, 0.5<--, 0.5<--, 0.5<--, 0.5<--        hs Troponin:              CSF:                      EKG:   MICROBIOLOGY:    Culture - Bronchial (collected 28 Feb 2023 09:00)  Source: .Bronchial None  Gram Stain (28 Feb 2023 20:49):    Few polymorphonuclear leukocytes seen per low power field    No Squamous epithelial cells seen per low power field    Rare Gram Negative Rods seen per oil power field      IMAGING:      Labs, imaging, EKG personally reviewed    RADIOLOGY & ADDITIONAL TESTS: Reviewed.

## 2023-03-01 NOTE — PROGRESS NOTE ADULT - ATTENDING COMMENTS
respiratory- on mechanical ventilation. mild settings. Renal- good urine output, cr stable. Sedation and pain control as needed. Cardiac- not on vasopressors. Nutrition - on tube feeds. Neuro- not following commands. Discussed the patient with Dr. Everett from Neuro, she will see the patient in the morning and update us as of the prognosis.

## 2023-03-01 NOTE — CHART NOTE - NSCHARTNOTEFT_GEN_A_CORE
PACU ANESTHESIA ADMISSION NOTE      Procedure: Exploratory laparotomy    Partial resection of sigmoid colon    Creation of colostomy of descending colon    Abdominal washout    Mobilization, splenic flexure    Second look laparotomy    Closure, temporary, abdominal cavity    Insertion, arterial line, percutaneous  left radial    Insertion, midline catheter    Bronchoscopy      Post op diagnosis:  Large bowel obstruction    Open wound of abdominal wall    Open wound of abdomen    Acute respiratory failure, unspecified whether with hypoxia or hypercapnia    Acute respiratory failure, unspecified whether with hypoxia or hypercapnia        ___x_  Intubated  TV:_350___       Rate: _10____      FiO2: __100_    __x__  Patent Airway ETT in situ    ____  Full return of protective reflexes    ____  Full recovery from anesthesia / back to baseline     Vitals:   see anesthesia record      Mental Status:  ____ Awake   _____ Alert   _____ Drowsy   ___x__ Sedated    Nausea/Vomiting:  ____ NO  ______Yes,   See Post - Op Orders          Pain Scale (0-10):  _____    Treatment: ____ None    ___x_ See Post - Op/PCA Orders    Post - Operative Fluids:   ____ Oral   __x__ See Post - Op Orders    Plan: Discharge:   ____Home       _____Floor     _____Critical Care    _____  Other:_________________    Comments:  Uneventful intraoperative course. No anesthesia issues or complications noted. Patient stable upon arrival to SICU. Report given to RN.

## 2023-03-01 NOTE — PROGRESS NOTE ADULT - ASSESSMENT
· Assessment	  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 SBO. No pneumoperitoneum.  Hosp course : SBO s/p diagnostic lap converted to exploratory laparotomy, sigmoidectomy, cecal enterotomy w/ stool expression and closure, descending colostomy, and abthera placement.    2/23 CT Brain Stroke Protocol : bilateral thalami and bilateral occipitotemporal with acute infarct/ischemic injury. No intracranial   hemorrhage or midline shift.    IMPRESSION;  Encephalopathy > non responsive off sedation  Neuro recs appreciated  2/24 EEG : generalized slowing, no Sz activity  Very poor prognosis  Bacterial PNA secondary to MDR Klebsiella  CXR : no new consolidation  2/15 BAL Klebsiella  WBC 26.7    RECOMMENDATIONS;  Care per ICU  Plan for Vac change tomorrow 3/2/23  No change in ABx  monitor WBC  Off loading to prevent pressure sores and preventive measures to avoid aspiration   f/u resp status     Trauma/ACS  Spectra [phone]

## 2023-03-01 NOTE — BRIEF OPERATIVE NOTE - NSICDXBRIEFPROCEDURE_GEN_ALL_CORE_FT
PROCEDURES:  Exploratory laparotomy 14-Feb-2023 06:01:06  Vito Zepeda  Partial resection of sigmoid colon 14-Feb-2023 06:01:41  Vito Zepeda  Creation of colostomy of descending colon 14-Feb-2023 06:01:56  Vito Zepeda  Abdominal washout 14-Feb-2023 06:02:10  Vito Zepeda  Mobilization, splenic flexure 14-Feb-2023 06:02:27  Vito Zepeda  
PROCEDURES:  Abdominal washout 14-Feb-2023 06:02:10  Vito Zepeda  Closure, temporary, abdominal cavity 16-Feb-2023 17:48:09  Bala Mijares  
PROCEDURES:  Exploratory laparotomy 14-Feb-2023 06:01:06  Vito Zepeda  Abdominal washout 14-Feb-2023 06:02:10  Vito Zepeda  Closure, temporary, abdominal cavity 16-Feb-2023 17:48:09  Bala Mijares  
PROCEDURES:  Exploratory laparotomy 14-Feb-2023 06:01:06  Vito Zepeda  Closure, wound, abdominal 01-Mar-2023 00:30:44  Frank Jonas  
PROCEDURES:  Abdominal washout 14-Feb-2023 06:02:10  Vito Zepeda  Second look laparotomy 16-Feb-2023 17:47:52  Bala Mijares  Closure, temporary, abdominal cavity 16-Feb-2023 17:48:09  Bala Mijares

## 2023-03-02 NOTE — PROGRESS NOTE ADULT - SUBJECTIVE AND OBJECTIVE BOX
RUBIO HUGHES  [id number]  82y Female    Indication for ICU admission: open abdomen s/p bowel resection for SBO  Admit Date: 2/9  ICU Date:  2/14  OR Date: 2/14, 2/16    No Known Allergies    PAST MEDICAL & SURGICAL HISTORY:  Atrial fibrillation  Hypertension  CVA (cerebrovascular accident)  HLD (hyperlipidemia)  GERD (gastroesophageal reflux disease)  History of cholecystectomy  History of hysterectomy  History of bladder suspension procedure    24HRS EVENT:  3/1  Night  overbreathing vent, breathing 23, rate set at 10  hypertensive to 170s--> fent push, does not follow commands, opens eyes spontaneously, ostomy with stool   BP improved to 150s  mg repleted, 30Kphos     Day  PS 10/8  restarted TF  u/o 25cc ,bolused 250cc       REVIEW OF SYSTEMS  [ ] A ten-point review of systems was otherwise negative except as noted.  [ x] Due to altered mental status/intubation, subjective information were not able to be obtained from the patient. History was obtained, to the extent possible, from review of the chart and collateral sources of information  ********************************************************************************************************:  Daily     Daily     Diet, NPO with Tube Feed:   Tube Feeding Modality: Gastrostomy  Peptomen Intense VHP  Total Volume for 24 Hours (mL): 960  Continuous  Starting Tube Feed Rate mL per Hour: 10  Increase Tube Feed Rate by (mL): 10     Every 4 hours  Until Goal Tube Feed Rate (mL per Hour): 40  Tube Feed Duration (in Hours): 24  Tube Feed Start Time: 10:30 (03-01-23 @ 08:27)      CURRENT MEDS:  Neurologic Medications  acetaminophen     Tablet .. 1000 milliGRAM(s) Oral every 8 hours  fentaNYL    Injectable 25 MICROGram(s) IV Push every 4 hours PRN AGITATION/TACHYPNEA  gabapentin 100 milliGRAM(s) Oral every 8 hours  ondansetron Injectable 4 milliGRAM(s) IV Push every 6 hours PRN Nausea and/or Vomiting  oxyCODONE    IR 2.5 milliGRAM(s) Oral every 4 hours PRN Moderate Pain (4 - 6)  oxyCODONE    IR 5 milliGRAM(s) Oral every 4 hours PRN Severe Pain (7 - 10)    Respiratory Medications    Cardiovascular Medications  amLODIPine   Tablet 5 milliGRAM(s) Oral daily    Gastrointestinal Medications  dextrose 5% + sodium chloride 0.45%. 1000 milliLiter(s) IV Continuous <Continuous>  magnesium sulfate  IVPB 2 Gram(s) IV Intermittent once  pantoprazole  Injectable 40 milliGRAM(s) IV Push every 24 hours  potassium phosphate IVPB 30 milliMole(s) IV Intermittent once    Genitourinary Medications    Hematologic/Oncologic Medications  aspirin  chewable 81 milliGRAM(s) Oral daily  heparin   Injectable 5000 Unit(s) SubCutaneous every 8 hours    Antimicrobial/Immunologic Medications  cefiderocol IVPB 1500 milliGRAM(s) IV Intermittent every 8 hours    Endocrine/Metabolic Medications  atorvastatin 80 milliGRAM(s) Oral at bedtime    Topical/Other Medications  chlorhexidine 0.12% Liquid 15 milliLiter(s) Oral Mucosa every 12 hours  chlorhexidine 2% Cloths 1 Application(s) Topical <User Schedule>  naloxone Injectable 0.4 milliGRAM(s) IV Push once      ICU Vital Signs Last 24 Hrs  T(C): 36.3 (02 Mar 2023 00:00), Max: 37.2 (01 Mar 2023 12:00)  T(F): 97.3 (02 Mar 2023 00:00), Max: 99 (01 Mar 2023 12:00)  HR: 88 (02 Mar 2023 01:00) (80 - 110)  BP: 157/70 (02 Mar 2023 01:00) (120/59 - 171/78)  BP(mean): 101 (02 Mar 2023 01:00) (85 - 123)  ABP: --  ABP(mean): --  RR: 19 (02 Mar 2023 01:00) (12 - 25)  SpO2: 99% (02 Mar 2023 01:00) (97% - 100%)    O2 Parameters below as of 02 Mar 2023 00:00  Patient On (Oxygen Delivery Method): ventilator    O2 Concentration (%): 40      Mode: AC/ CMV (Assist Control/ Continuous Mandatory Ventilation)  RR (machine): 10  TV (machine): 320  FiO2: 40  PEEP: 8  ITime: 1  MAP: 13  PIP: 27      I&O's Summary    28 Feb 2023 07:01  -  01 Mar 2023 07:00  --------------------------------------------------------  IN: 3158 mL / OUT: 1770 mL / NET: 1388 mL    01 Mar 2023 07:01  -  02 Mar 2023 01:43  --------------------------------------------------------  IN: 1730 mL / OUT: 1370 mL / NET: 360 mL      I&O's Detail    28 Feb 2023 07:01  -  01 Mar 2023 07:00  --------------------------------------------------------  IN:    dextrose 5% + sodium chloride 0.45%: 2070 mL    IV PiggyBack: 300 mL    IV PiggyBack: 500 mL    PRBCs (Packed Red Blood Cells): 288 mL  Total IN: 3158 mL    OUT:    Colostomy (mL): 15 mL    Indwelling Catheter - Urethral (mL): 1255 mL    Nasogastric/Oral tube (mL): 100 mL    Other (mL): 200 mL    VAC (Vacuum Assisted Closure) System (mL): 200 mL  Total OUT: 1770 mL    Total NET: 1388 mL      01 Mar 2023 07:01  -  02 Mar 2023 01:43  --------------------------------------------------------  IN:    dextrose 5% + sodium chloride 0.45%: 1350 mL    IV PiggyBack: 100 mL    Vital High Protein: 280 mL  Total IN: 1730 mL    OUT:    Colostomy (mL): 575 mL    Indwelling Catheter - Urethral (mL): 705 mL    Other (mL): 90 mL  Total OUT: 1370 mL    Total NET: 360 mL        PHYSICAL EXAM:   does not follow commands, moves intermittently  no acute distress  equal chest rise b/l  abdomen soft, obese, dressings clean and dry  extremities soft, edematous x4  urinary cath in place

## 2023-03-02 NOTE — PROGRESS NOTE ADULT - TIME BILLING
Review of imaging and chart; obtaining history; examination of pt; discussion and coordination of care.
patient's care

## 2023-03-02 NOTE — PROGRESS NOTE ADULT - ASSESSMENT
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 SBO. No pneumoperitoneum.  Hosp course : SBO s/p diagnostic lap converted to exploratory laparotomy, sigmoidectomy, cecal enterotomy w/ stool expression and closure, descending colostomy, and abthera placement.    2/23 CT Brain Stroke Protocol : bilateral thalami and bilateral occipitotemporal with acute infarct/ischemic injury. No intracranial   hemorrhage or midline shift.    IMPRESSION;  Encephalopathy > non responsive off sedation  Neuro recs appreciated  2/24 EEG : generalized slowing, no Sz activity  Very poor prognosis  Bacterial PNA secondary to MDR Klebsiella  CXR : no new consolidation  2/15 BAL Klebsiella  WBC 26.7    PLAN:  Care per SICU  Plan for Vac change tomorrow 3/3/23  No change in ABx  Monitor WBC  Off loading to prevent pressure sores and preventive measures to avoid aspiration   Monitor vent settings and f/u resp status     Trauma/ACS  Spectra [phone]

## 2023-03-02 NOTE — PROGRESS NOTE ADULT - NS ATTEND OPT1 GEN_ALL_CORE
I attest my time as attending is greater than 50% of the total combined time spent on qualifying patient care activities by the PA/NP and attending.
I independently performed the documented:
I attest my time as attending is greater than 50% of the total combined time spent on qualifying patient care activities by the PA/NP and attending.
I independently performed the documented:

## 2023-03-02 NOTE — PROGRESS NOTE ADULT - SUBJECTIVE AND OBJECTIVE BOX
Neurology Progress Note    Interval History:  pt still off sedation and not responding to commands. 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 She has had nausea and emesis which she described as bilious associated with the pain. She has had no bowel movements for the past three days but states that she has had flatus as soon as today.  She reports having diarrhea five days ago. Patient denied hematochezia, hematemesis, chest pain, cough change from chronic baseline, fever, sick contacts, nor urinary symptoms. (10 Feb 2023 02:21)      PAST MEDICAL & SURGICAL HISTORY:  Atrial fibrillation    Hypertension    CVA (cerebrovascular accident)    HLD (hyperlipidemia)    GERD (gastroesophageal reflux disease)    History of cholecystectomy    History of hysterectomy    History of bladder suspension procedure            Medications:  acetaminophen     Tablet .. 1000 milliGRAM(s) Oral every 8 hours  amLODIPine   Tablet 5 milliGRAM(s) Oral daily  aspirin  chewable 81 milliGRAM(s) Oral daily  atorvastatin 80 milliGRAM(s) Oral at bedtime  cefiderocol IVPB 1500 milliGRAM(s) IV Intermittent every 8 hours  chlorhexidine 0.12% Liquid 15 milliLiter(s) Oral Mucosa every 12 hours  chlorhexidine 2% Cloths 1 Application(s) Topical <User Schedule>  fentaNYL    Injectable 25 MICROGram(s) IV Push every 4 hours PRN  gabapentin 100 milliGRAM(s) Oral every 8 hours  heparin   Injectable 5000 Unit(s) SubCutaneous every 8 hours  naloxone Injectable 0.4 milliGRAM(s) IV Push once  ondansetron Injectable 4 milliGRAM(s) IV Push every 6 hours PRN  oxyCODONE    IR 2.5 milliGRAM(s) Oral every 4 hours PRN  oxyCODONE    IR 5 milliGRAM(s) Oral every 4 hours PRN  pantoprazole  Injectable 40 milliGRAM(s) IV Push every 24 hours      Vital Signs Last 24 Hrs  T(C): 36.4 (02 Mar 2023 08:00), Max: 36.7 (01 Mar 2023 16:00)  T(F): 97.6 (02 Mar 2023 08:00), Max: 98.1 (01 Mar 2023 16:00)  HR: 97 (02 Mar 2023 11:00) (80 - 110)  BP: 128/79 (02 Mar 2023 11:00) (122/58 - 163/90)  BP(mean): 94 (02 Mar 2023 11:00) (82 - 123)  RR: 24 (02 Mar 2023 11:00) (15 - 28)  SpO2: 99% (02 Mar 2023 11:00) (98% - 100%)    Parameters below as of 02 Mar 2023 11:00  Patient On (Oxygen Delivery Method): ventilator    O2 Concentration (%): 40    Neurological Exam:   Mental status: Awake, alert and oriented x3.  Recent and remote memory intact.  Naming, repetition and comprehension intact.  Attention/concentration intact.  No dysarthria, no aphasia.  Fund of knowledge appropriate.    Cranial nerves: Pupils equally round and reactive to light, visual fields full, no nystagmus, extraocular muscles intact, V1 through V3 intact bilaterally and symmetric, face symmetric, hearing intact to finger rub, palate elevation symmetric, tongue was midline.  Motor:  MRC grading 5/5 b/l UE/LE.   strength 5/5.  Normal tone and bulk.  No abnormal movements.    Sensation: Intact to light touch, proprioception, and pinprick.   Coordination: No dysmetria on finger-to-nose and heel-to-shin.  No dysdiadokinesia.  Reflexes: 2+ in bilateral UE/LE, downgoing toes bilaterally. (-) Daniels.  Gait: Narrow and steady. No ataxia.  Romberg negative    Labs:  CBC Full  -  ( 02 Mar 2023 00:40 )  WBC Count : 14.79 K/uL  RBC Count : 3.94 M/uL  Hemoglobin : 11.0 g/dL  Hematocrit : 34.5 %  Platelet Count - Automated : 565 K/uL  Mean Cell Volume : 87.6 fL  Mean Cell Hemoglobin : 27.9 pg  Mean Cell Hemoglobin Concentration : 31.9 g/dL  Auto Neutrophil # : x  Auto Lymphocyte # : x  Auto Monocyte # : x  Auto Eosinophil # : x  Auto Basophil # : x  Auto Neutrophil % : x  Auto Lymphocyte % : x  Auto Monocyte % : x  Auto Eosinophil % : x  Auto Basophil % : x    03-02    140  |  110  |  11  ----------------------------<  120<H>  3.9   |  20  |  0.5<L>    Ca    6.4<L>      02 Mar 2023 00:40  Phos  2.2     03-02  Mg     1.9     03-02              Assessment:  This is a 82y Female w/ h/o     Plan: Neurology Progress Note    Interval History:  pt still off sedation and not responding to commands. Opens eyes spontaneously. recent surgical procedure to abdomen with skin closure.    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 She has had nausea and emesis which she described as bilious associated with the pain. She has had no bowel movements for the past three days but states that she has had flatus as soon as today.  She reports having diarrhea five days ago. Patient denied hematochezia, hematemesis, chest pain, cough change from chronic baseline, fever, sick contacts, nor urinary symptoms. (10 Feb 2023 02:21)      PAST MEDICAL & SURGICAL HISTORY:  Atrial fibrillation    Hypertension    CVA (cerebrovascular accident)    HLD (hyperlipidemia)    GERD (gastroesophageal reflux disease)    History of cholecystectomy    History of hysterectomy    History of bladder suspension procedure            Medications:  acetaminophen     Tablet .. 1000 milliGRAM(s) Oral every 8 hours  amLODIPine   Tablet 5 milliGRAM(s) Oral daily  aspirin  chewable 81 milliGRAM(s) Oral daily  atorvastatin 80 milliGRAM(s) Oral at bedtime  cefiderocol IVPB 1500 milliGRAM(s) IV Intermittent every 8 hours  chlorhexidine 0.12% Liquid 15 milliLiter(s) Oral Mucosa every 12 hours  chlorhexidine 2% Cloths 1 Application(s) Topical <User Schedule>  fentaNYL    Injectable 25 MICROGram(s) IV Push every 4 hours PRN  gabapentin 100 milliGRAM(s) Oral every 8 hours  heparin   Injectable 5000 Unit(s) SubCutaneous every 8 hours  naloxone Injectable 0.4 milliGRAM(s) IV Push once  ondansetron Injectable 4 milliGRAM(s) IV Push every 6 hours PRN  oxyCODONE    IR 2.5 milliGRAM(s) Oral every 4 hours PRN  oxyCODONE    IR 5 milliGRAM(s) Oral every 4 hours PRN  pantoprazole  Injectable 40 milliGRAM(s) IV Push every 24 hours      Vital Signs Last 24 Hrs  T(C): 36.4 (02 Mar 2023 08:00), Max: 36.7 (01 Mar 2023 16:00)  T(F): 97.6 (02 Mar 2023 08:00), Max: 98.1 (01 Mar 2023 16:00)  HR: 97 (02 Mar 2023 11:00) (80 - 110)  BP: 128/79 (02 Mar 2023 11:00) (122/58 - 163/90)  BP(mean): 94 (02 Mar 2023 11:00) (82 - 123)  RR: 24 (02 Mar 2023 11:00) (15 - 28)  SpO2: 99% (02 Mar 2023 11:00) (98% - 100%)    Parameters below as of 02 Mar 2023 11:00  Patient On (Oxygen Delivery Method): ventilator    O2 Concentration (%): 40    Neurological Exam:   PERRLA, +oculocephalic reflex, + corneal reflex, not intact to visual threat  motor: no spontaneous movements or tremors.   slight grimace to noxious stimuli on right arm     Labs:  CBC Full  -  ( 02 Mar 2023 00:40 )  WBC Count : 14.79 K/uL  RBC Count : 3.94 M/uL  Hemoglobin : 11.0 g/dL  Hematocrit : 34.5 %  Platelet Count - Automated : 565 K/uL  Mean Cell Volume : 87.6 fL  Mean Cell Hemoglobin : 27.9 pg  Mean Cell Hemoglobin Concentration : 31.9 g/dL  Auto Neutrophil # : x  Auto Lymphocyte # : x  Auto Monocyte # : x  Auto Eosinophil # : x  Auto Basophil # : x  Auto Neutrophil % : x  Auto Lymphocyte % : x  Auto Monocyte % : x  Auto Eosinophil % : x  Auto Basophil % : x    03-02    140  |  110  |  11  ----------------------------<  120<H>  3.9   |  20  |  0.5<L>    Ca    6.4<L>      02 Mar 2023 00:40  Phos  2.2     03-02  Mg     1.9     03-02      < from: CT Head No Cont (23 @ 00:06) >    Findings:    There is streak artifact from multiple electrodes overlying the head.    The ventricles and cortical sulci are stable with no evidence of   hydrocephalus.    There is no significant interval change in the edema and expansion   involving bilateral occipital lobes and thalami.    Typical posterior fossa streak artifact is noted.    No acuteintracranial hemorrhage or midline shift is demonstrated.    Vascular calcifications are noted.    Endotracheal and nasogastric tubes are present.    Polypoid opacities are seen within the right maxillary and sphenoid   sinuses. Partial opacificationof the middle ear and mastoid cavities   noted.    IMPRESSION:    No significant interval change in the edema involving bilateral thalami   and occipital lobes.    < end of copied text >  VEEG in the last 24 hours:    Background - continuous, symmetrical, reactive, less than optimally organized, reaching frequencies in the range of 6-7Hz    Focal and generalized slowin. mild to moderate generalized slowing  2. bilateral focal slowing over posterior quadrants with shifting asymmetry, R>L  3. borderline independent left frontal focal slowing    Interictal activity - small number of R>L FT sharp transients    Events - none    Seizures - none    Impression: VEEG as above    Plan - per neurovascular team

## 2023-03-02 NOTE — PROGRESS NOTE ADULT - SUBJECTIVE AND OBJECTIVE BOX
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 Vent 40/8. Planning for wound vac change tomorrow 3/3/23.     PAST MEDICAL & SURGICAL HISTORY:  Atrial fibrillation      Hypertension      CVA (cerebrovascular accident)      HLD (hyperlipidemia)      GERD (gastroesophageal reflux disease)      History of cholecystectomy      History of hysterectomy      History of bladder suspension procedure          Vitals:   T(F): 97.9 (03-02-23 @ 12:00), Max: 98.1 (03-01-23 @ 16:00)  HR: 95 (03-02-23 @ 13:00)  BP: 132/63 (03-02-23 @ 13:00)  RR: 25 (03-02-23 @ 13:00)  SpO2: 99% (03-02-23 @ 13:00)  Mode: AC/ CMV (Assist Control/ Continuous Mandatory Ventilation), RR (machine): 10, TV (machine): 320, FiO2: 40, PEEP: 8, ITime: 1, MAP: 14, PIP: 31    Diet, NPO with Tube Feed:   Tube Feeding Modality: Gastrostomy  Peptomen Intense VHP  Total Volume for 24 Hours (mL): 960  Continuous  Starting Tube Feed Rate mL per Hour: 10  Increase Tube Feed Rate by (mL): 10     Every 4 hours  Until Goal Tube Feed Rate (mL per Hour): 40  Tube Feed Duration (in Hours): 24  Tube Feed Start Time: 10:30      Fluids:     I & O's:    03-01-23 @ 07:01  -  03-02-23 @ 07:00  --------------------------------------------------------  IN:    dextrose 5% + sodium chloride 0.45%: 1800 mL    IV PiggyBack: 500 mL    IV PiggyBack: 200 mL    IV PiggyBack: 100 mL    Vital High Protein: 480 mL  Total IN: 3080 mL    OUT:    Colostomy (mL): 850 mL    Indwelling Catheter - Urethral (mL): 920 mL    Other (mL): 90 mL    VAC (Vacuum Assisted Closure) System (mL): 200 mL  Total OUT: 2060 mL    Total NET: 1020 mL        Bowel Movement: : [] YES [] NO  Flatus: : [] YES [] NO    PHYSICAL EXAM:  Neuro: awake, not alert. Non-verbal. Grimaces to pain applied to the upper extremities  Resp: Intubated, vented (40/8)  Cards:  S1, S2.  Regular rate and rhythm.  Peripheral edema +++ pitting  GI: Abdomen soft, Non-tender, Ostomy is pink with stool and air in ostomy bag. Midline wound vac in place  Extremities: Extremities warm, pink, well-perfused.  Derm: Poor skin integrity- large skin tear/ degloving of right upper extremity    MEDICATIONS  (STANDING):  acetaminophen     Tablet .. 1000 milliGRAM(s) Oral every 8 hours  amLODIPine   Tablet 5 milliGRAM(s) Oral daily  aspirin  chewable 81 milliGRAM(s) Oral daily  atorvastatin 80 milliGRAM(s) Oral at bedtime  cefiderocol IVPB 1500 milliGRAM(s) IV Intermittent every 8 hours  chlorhexidine 0.12% Liquid 15 milliLiter(s) Oral Mucosa every 12 hours  chlorhexidine 2% Cloths 1 Application(s) Topical <User Schedule>  gabapentin 100 milliGRAM(s) Oral every 8 hours  heparin   Injectable 5000 Unit(s) SubCutaneous every 8 hours  naloxone Injectable 0.4 milliGRAM(s) IV Push once  pantoprazole  Injectable 40 milliGRAM(s) IV Push every 24 hours    MEDICATIONS  (PRN):  fentaNYL    Injectable 25 MICROGram(s) IV Push every 4 hours PRN AGITATION/TACHYPNEA  ondansetron Injectable 4 milliGRAM(s) IV Push every 6 hours PRN Nausea and/or Vomiting  oxyCODONE    IR 2.5 milliGRAM(s) Oral every 4 hours PRN Moderate Pain (4 - 6)  oxyCODONE    IR 5 milliGRAM(s) Oral every 4 hours PRN Severe Pain (7 - 10)    DVT PROPHYLAXIS: heparin   Injectable 5000 Unit(s) SubCutaneous every 8 hours    GI PROPHYLAXIS: pantoprazole  Injectable 40 milliGRAM(s) IV Push every 24 hours    ANTIBIOTICS:  cefiderocol IVPB 1500 milliGRAM(s)    LAB/STUDIES:  Labs:  CAPILLARY BLOOD GLUCOSE  POCT Blood Glucose.: 122 mg/dL (02 Mar 2023 06:51)  POCT Blood Glucose.: 106 mg/dL (01 Mar 2023 23:59)                        11.0   14.79 )-----------( 565      ( 02 Mar 2023 00:40 )             34.5       03-02    140  |  110  |  11  ----------------------------<  120<H>  3.9   |  20  |  0.5<L>      Calcium, Total Serum: 6.4 mg/dL (03-02-23 @ 00:40)     Blood Gas Arterial, Lactate: 0.80 mmol/L (03-02-23 @ 03:25)    ABG - ( 02 Mar 2023 03:25 )  pH: 7.39  /  pCO2: 36    /  pO2: 95    / HCO3: 22    / Base Excess: -2.6  /  SaO2: 98.5      ABG - ( 27 Feb 2023 11:42 )  pH: 7.46  /  pCO2: 36    /  pO2: 91    / HCO3: 26    / Base Excess: 1.7   /  SaO2: 99.4      ABG - ( 27 Feb 2023 03:41 )  pH: 7.50  /  pCO2: 32    /  pO2: 189   / HCO3: 25    / Base Excess: 1.8   /  SaO2: 100.0     Culture - Bronchial (collected 28 Feb 2023 09:00)  Source: .Bronchial None  Gram Stain (28 Feb 2023 20:49):    Few polymorphonuclear leukocytes seen per low power field    No Squamous epithelial cells seen per low power field    Rare Gram Negative Rods seen per oil power field  Preliminary Report (01 Mar 2023 18:28):    Moderate Klebsiella pneumoniae    Few Pseudomonas aeruginosa

## 2023-03-02 NOTE — PROGRESS NOTE ADULT - ASSESSMENT
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,4,5 digit numbness) on aspirin and no other antiplatelet/anticoagulant agents,  Afib, GERD, HTN, HLD, chronic UTIs, pasty cholecystectomy(>20yrs ago), hysterectomy (>45yrs ago), and bladder lift (>30yrs ago) presented to the ED with a 3 day history of intermittent sharp, non-radiating epigastric pain. S/p Sigmoid colectomy with ostomy, sigmoid mass resection for SBO/LBO 7 days ago and now on sedation vacation patient not responding. Last known well unclear. Abdomen remains open. Stroke code called. Out of the window for IV thrombolytics. Patient with no LVO therefore not a candidate for IA intervention. CTH revealed Patchy hypodensities in bilateral thalami and bilateral occipitotemporal regions like representing acute infarct/ischemic injury. Etiology unclear at this time, differentials include embolic or flow related ischemic strokes, less likely infectious or metastatic etiology. vEEG did not show any seizures, but transients of sharp waves.  Had family meeting today and discussed prognosis and answered all family members questions    Plan  extensive conversation with family at bedside regarding poor prognosis  When stable MRI brain w/o DILIP (assess for brainstem involvement which would klever a poorer prognosis)  medical management per primary team  attending note to follow   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,4,5 digit numbness) on aspirin and no other antiplatelet/anticoagulant agents,  Afib, GERD, HTN, HLD, chronic UTIs, pasty cholecystectomy(>20yrs ago), hysterectomy (>45yrs ago), and bladder lift (>30yrs ago) presented to the ED with a 3 day history of intermittent sharp, non-radiating epigastric pain. S/p Sigmoid colectomy with ostomy, sigmoid mass resection for SBO/LBO 7 days ago and now on sedation vacation patient not responding. Last known well unclear. Abdomen remains open. Stroke code called. Out of the window for IV thrombolytics. Patient with no LVO therefore not a candidate for IA intervention. CTH revealed Patchy hypodensities in bilateral thalami and bilateral occipitotemporal regions like representing acute infarct/ischemic injury. Etiology unclear at this time, differentials include embolic or flow related ischemic strokes, less likely infectious or metastatic etiology. vEEG did not show any seizures, but transients of sharp waves.  Had family meeting today and discussed prognosis and answered all family members questions    Plan  extensive conversation with family at bedside regarding poor prognosis  medical management per primary team  attending note to follow

## 2023-03-02 NOTE — PROGRESS NOTE ADULT - ATTENDING COMMENTS
remains on mechanoical ventilation. mild settings. Cardiac- not on vasopressors. Renal- good urine output, cr stable. neuro- no change, not following commands. Sedation and pain control as needed. Nutrition- on tube feeds. I had a discussion with the family and Dr. Everett, she explained to the family the fact that she had a bad stroke and the possible outcomes associated with it, including blindness, communication. the family will discuss the options and get back to us.

## 2023-03-02 NOTE — PROGRESS NOTE ADULT - SUBJECTIVE AND OBJECTIVE BOX
INTERVAL HPI/OVERNIGHT EVENTS:    SUBJECTIVE: Patient seen and examined at bedside.     cc: abd pain  unable to obtain ros    OBJECTIVE:    VITAL SIGNS:  Vital Signs Last 24 Hrs  T(C): 36.6 (02 Mar 2023 12:00), Max: 36.7 (01 Mar 2023 16:00)  T(F): 97.9 (02 Mar 2023 12:00), Max: 98.1 (01 Mar 2023 16:00)  HR: 95 (02 Mar 2023 13:00) (80 - 97)  BP: 132/63 (02 Mar 2023 13:00) (120/73 - 163/90)  BP(mean): 90 (02 Mar 2023 13:00) (82 - 123)  RR: 25 (02 Mar 2023 13:00) (15 - 28)  SpO2: 99% (02 Mar 2023 13:00) (98% - 100%)    Parameters below as of 02 Mar 2023 13:00  Patient On (Oxygen Delivery Method): ventilator          PHYSICAL EXAM:    General: NAD  HEENT: NC/AT; PERRL, clear conjunctiva  Neck: supple  Respiratory: CTA b/l  Cardiovascular: +S1/S2; RRR  Abdomen: soft, NT/ND; +BS x4  Extremities: WWP, 2+ peripheral pulses b/l; no LE edema  Skin:  Neurological:    MEDICATIONS:  MEDICATIONS  (STANDING):  acetaminophen     Tablet .. 1000 milliGRAM(s) Oral every 8 hours  amLODIPine   Tablet 5 milliGRAM(s) Oral daily  aspirin  chewable 81 milliGRAM(s) Oral daily  atorvastatin 80 milliGRAM(s) Oral at bedtime  cefiderocol IVPB 1500 milliGRAM(s) IV Intermittent every 8 hours  chlorhexidine 0.12% Liquid 15 milliLiter(s) Oral Mucosa every 12 hours  chlorhexidine 2% Cloths 1 Application(s) Topical <User Schedule>  gabapentin 100 milliGRAM(s) Oral every 8 hours  heparin   Injectable 5000 Unit(s) SubCutaneous every 8 hours  naloxone Injectable 0.4 milliGRAM(s) IV Push once  pantoprazole  Injectable 40 milliGRAM(s) IV Push every 24 hours    MEDICATIONS  (PRN):  fentaNYL    Injectable 25 MICROGram(s) IV Push every 4 hours PRN AGITATION/TACHYPNEA  ondansetron Injectable 4 milliGRAM(s) IV Push every 6 hours PRN Nausea and/or Vomiting  oxyCODONE    IR 2.5 milliGRAM(s) Oral every 4 hours PRN Moderate Pain (4 - 6)  oxyCODONE    IR 5 milliGRAM(s) Oral every 4 hours PRN Severe Pain (7 - 10)      ALLERGIES:  Allergies    No Known Allergies    Intolerances        LABS:                        11.0   14.79 )-----------( 565      ( 02 Mar 2023 00:40 )             34.5     Hemoglobin: 11.0 g/dL (03-02 @ 00:40)  Hemoglobin: 11.8 g/dL (03-01 @ 02:13)  Hemoglobin: 7.3 g/dL (02-28 @ 11:37)  Hemoglobin: 7.4 g/dL (02-28 @ 05:32)  Hemoglobin: 6.7 g/dL (02-28 @ 04:55)    CBC Full  -  ( 02 Mar 2023 00:40 )  WBC Count : 14.79 K/uL  RBC Count : 3.94 M/uL  Hemoglobin : 11.0 g/dL  Hematocrit : 34.5 %  Platelet Count - Automated : 565 K/uL  Mean Cell Volume : 87.6 fL  Mean Cell Hemoglobin : 27.9 pg  Mean Cell Hemoglobin Concentration : 31.9 g/dL  Auto Neutrophil # : x  Auto Lymphocyte # : x  Auto Monocyte # : x  Auto Eosinophil # : x  Auto Basophil # : x  Auto Neutrophil % : x  Auto Lymphocyte % : x  Auto Monocyte % : x  Auto Eosinophil % : x  Auto Basophil % : x    03-02    140  |  110  |  11  ----------------------------<  120<H>  3.9   |  20  |  0.5<L>    Ca    6.4<L>      02 Mar 2023 00:40  Phos  2.2     03-02  Mg     1.9     03-02      Creatinine Trend: 0.5<--, 0.5<--, 0.5<--, 0.5<--, 0.5<--, 0.5<--        hs Troponin:    ABG - ( 02 Mar 2023 03:25 )  pH, Arterial: 7.39  pH, Blood: x     /  pCO2: 36    /  pO2: 95    / HCO3: 22    / Base Excess: -2.6  /  SaO2: 98.5                03:25 - ABG - pH: 7.39  |  pCO2: 36    |  pO2: 95    | Lactate:       | BE: -2.6         CSF:                      EKG:   MICROBIOLOGY:    Culture - Bronchial (collected 28 Feb 2023 09:00)  Source: .Bronchial None  Gram Stain (28 Feb 2023 20:49):    Few polymorphonuclear leukocytes seen per low power field    No Squamous epithelial cells seen per low power field    Rare Gram Negative Rods seen per oil power field  Preliminary Report (01 Mar 2023 18:28):    Moderate Klebsiella pneumoniae    Few Pseudomonas aeruginosa      IMAGING:      Labs, imaging, EKG personally reviewed    RADIOLOGY & ADDITIONAL TESTS: Reviewed.

## 2023-03-02 NOTE — PROGRESS NOTE ADULT - ASSESSMENT
82F PMHx CVA 2017, HTN, s/p edson, s/p hysterectomy here with abd pain, found to have pneumatosis intestinalis, SBO. Code stroke.    IMP  #Pneumatosis intestinalis, SBO; due to colon mass    ct abd with pneumatosis intestinalis of cecum, ascending colon; sbo    s/p ex lap with resection 2/14; +colon mass; colostomy created    s/p second look lap 2/16    s/p closure 2/19    s/p washout 2/21    s/p closure with peg 3/1  #Code stroke    cth with bl thalami, occipitotemporal hypodensities    cta with severe stenosis L vert    mri with leptomeningeal enhancement, abnl signal diffuse    veeg +interictal activity 2/24  #Kleb pna    dta cx  kleb 2/15    bcx ntd  #L lung whiteout    resolved s/p bronch 2/28  #CVA  #HTN    PLAN  f/u neuro re: need for lp if in line with goc  vent per sicu  fetroja for 14 days from 2/19 per id  [facility]    Hellen  [phone]

## 2023-03-02 NOTE — PROGRESS NOTE ADULT - ASSESSMENT
Assessment and Plan:   82y female w/ sbo, sigmoid mass w/ pneumoperitoneum    OR#5 2/28- s/p skin closure with binding vicryl mesh, fascia left open. NGT and abthera removed during case. Peg placement  OR #4 2/21: abd washout, abthera placed   OR #3 2/19: abdominal washout, no further resection   OR #2 2/16: Previous cecostomy site noted to be ischemic, omental patch repair, rest of bowel no ischemic changes, bowel edematous, abthera vac in place, abdomen open  OR #1 2/14: s/p Sigmoid colectomy with ostomy, sigmoid mass resection for SBO/LBO.     NEURO:  #Acute pain    -APAP IV prn    -Fentanyl 25 mcg prn q4  #Acute sedation    - not on any sedation currently  #Code stroke 2/23   -CTH revealed patchy hypodensities in bilateral thalami and bilateral occipitotemporal regions like representing acute infarct/ischemic injury. No intracranial hemorrhage or midline shift.   -CTA: no LVO/AVM, left vertebral artery stenosis.  - 2nd CTH completed > no significant change in edema involving b/l thalamic and occipital lobes   -EEG: mild- mod generalized slowing, bilateral focal slowing over posterior quadrants with shifting asymmetry, R>L, borderline independent left frontal focal slowing,   -< MR Head w/wo IV Cont (02.27.23 @ 21:22) >  Diffusely abnormal signal and leptomeningeal enhancement involving the   bilateral occipital, temporal, thalami, cerebellar white matter as well   as the splenium of the corpus callosum in concert findings favor subacute   ischemic etiology given predominant involvement of the posterior   circulation though other etiologies including meningitis but, toxic   metabolic or neoplasm cannot entirely be excluded. Correlation with CSF   analysis is recommended.    Moderate chronic microvascular type changes as well as a chronic infarcts   involving the right parietal lobe and bilateral cerebellar hemispheres.  < end of copied text >      RESP:   #Oxygenation  RR (machine): 10, TV (machine): 320, FiO2: 40, PEEP: 8, ITime: 1    02-27 @ 11:42--7.46 / 36 / 91 / 26 / 99.4  O2 99.4  Lac 0.40    -ET  22cm  -Daily CXR and ABG while intubated  - SAT/SBT per protocol  #L side whiteout, 2/28  -s/p bronch    CARDS:   #hypotension 2/2 hypovolemic shock-resolved  -Prev on levophed, off since 2/20  -#Imaging/labs  Echo 2/18:  elevated LA pressures, Lv not well visualized suspect normal lv function, suggest lumason, distal anteroseptal hypokinesis, sclerotic Aov  -Echo 2/27: LVEF 50-55%. Multiple regional wall motion abnormalities. 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  attempts to close lower portion resulted in elevated peak airway pressures. Application of abthera vac dressing  -RTOR 2/21 for abdominal washout and replacement of abthera   -#Diet    Diet, NPO with Tube Feed:   Tube Feeding Modality: Gastrostomy  Peptomen Intense VHP  Total Volume for 24 Hours (mL): 960  Continuous  Starting Tube Feed Rate mL per Hour: 10  Increase Tube Feed Rate by (mL): 10     Every 4 hours  Until Goal Tube Feed Rate (mL per Hour): 40  Tube Feed Duration (in Hours): 24  Tube Feed Start Time: 10:30 (03-01-23 @ 08:27) [Active]    -NPO with VHP @40cc (goal as of 3/1 PM)  -Off TPN now   #GI Prophylaxis    -Pantoprazole  #Bowel regimen    -HOLDING    /RENAL:   #urine output in critically ill    -indwelling rodriguez (placed 2/14/2023)  #GERA  (baseline Cr 0.8) > resolved     -peak 1.4  #urine output in critically ill  Monitor UO-rodriguez in place   Labs:          BUN/Cr- 13/0.5  -->,  11/0.5  -->          Electrolytes-Na 140 // K 3.9 // Mg 1.9 //  Phos 2.2 (03-02 @ 00:40)  Daily BMP with electrolyte repletion  Appreciate strict I/Os    HEME/ONC:   Holding anticoagulation at this time, ASA 81   #DVT ppx: SCDs, HSQ  Hb/Hct:  7.4/24.3  -->,  7.3/24.4, 3 prbc  -->,  11.8/36.4  -->, 11.0/34.5 -->  Plts:  571  -->,  626  -->,  599  --> 565  PTT/INR: 19.6/0.94 (02-27)  T&S:ABO RH Interpretation:  (02-27)  T&S expires: 3/2    ID:  WBC- 17.33  --->>,  16.47  --->>,  14.79  --->>  Antibiotics-  Culture-  Temp trend- 24hrs T(F): 97.3 (03-02 @ 00:00), Max: 99 (03-01 @ 12:00)  Current antibiotics-cefiderocol IVPB 1500 every 8 hours  ID following: Abx for a total of 14 days since 2/19  Cx: BAL 2/28- GNR, cx pending    ENDO:  #Monitor for hyperglycemia     Glucose, Serum: 120 (03-02 @ 00:40)    -FSG q6 while NPO    -Glucose goal 140-180    -if above 180 start ISS       LINES/DRAINS:  PIV, Rodriguez, ETT, Abdominal abthera vac, NGT. LIJ, LUE midline    ADVANCED DIRECTIVES:  Full Code    INDICATION FOR SICU: HEMODYNAMIC MONITORING POSTOPERATIVE REQUIRING VASOPRESSORS AND MECHANICAL VENTILATION     Dispo: SICU. Discussed with Dr. Rebollar

## 2023-03-02 NOTE — PROGRESS NOTE ADULT - NS ATTEND AMEND GEN_ALL_CORE FT
on mechanical ventilation. hemodynamically on small dose of levophed, will wean as tolerated. nutrition- npo. Renal- making good urine , cr 1.4. ID- on rocephin and flagyl. Sedation and pain control as needed. open abdomen - possible OR tomorrow for closure. received fluids resuscitation.
on mechanical ventilation. mild settings. Sedation - precedex. Cardiac- on a small dose levo 0.02. Renal - good urine output, cr stable. nutrition- TPN. will start tube feeds post op. to OR for washout and possible closure. ID- abx for PNA.
on mechanical ventilation, mild settings (40% o2 and 5 peep). Cardiac- on levophed 0.04, continue to wean. renal- good urine output (1,400), cr - 0.8. Sedation and pain control as needed. ID- on zosyn for BAL. Nutrition- start TPN. open abdomen- possibly back to OR Sunday. remains critical.
on mechanical ventilation- mild settings, 40% O2 and 5 peep. cardiac- levophed 0.02. Nutrition- on TPN. renal- making good urine, Cr stable. will check BAL. Sedation and pain control as needed. to OR for washout and possible closure in am.
on mechanical ventilation. mild settings. 40% O2 and 5 peep. Cardiac- on a small dose levo, wean as tolerated. renal- good urine output (1,200), cr 1.1. Sedation and pain control as needed. open abdomen - possibly OR today for washout. PNA- on zosyn. remains critical.
Patient seen and examined and agree with above except as noted.  Patients history, notes ,labs, imaging, vitals and meds reviewed personally.  Patient off sedation eyes open not following commands or tracking  No movement in UE and LE and responds to noxious stimuli in b/l UE with grimacing but no response in the b/l LE  PERRL  Likely b/l posterior circulation ischemia; Unclear etiology  Would suggest cardiac evaluation for TTE with contrast  Discussed case with SICU team  Would hold off surgery to close abdomen for atleast 3-5 more days unless absolutely necessary for medical reasons  Avoid hypotension  Avoid hyperglycemia    Plan as above
Pt is a 81 yo F with PMHx of prior stroke, afib not on AC, HTN, HLD, s/p edson/hysterectomy, who presented with abdominal pain. Now s/p sigmoid colectomy with ostomy, sigmoid mass resection for SBP. Stroke code called on 2/23 for unresponsiveness during a sedation vacation (pt kept intubated after initial surgery on 2/14). CT head showed bilateral occipital and thalamic hypodensity concerning for acute-subacute ischemic strokes. MRI brain obtained on 2/27 confirms these changes, most consistent with bilateral PCA stroke sparing midbrain.   On exam, pt has eyes open, does not attend to examiner or follow commands. No blink ot visual threat b/l, no purposeful movement appreciated, cranial reflexes intact.  Given extent of infarcts involving b/l thalami, poor mentation off sedation, as well as pt's age and multiple co-morbidities, poor prognosis for stroke recovery. Discussed findings and prognosis with SICU attending and pt's son at bedside (with her other son on the phone).   Please call with any further questions/concerns.

## 2023-03-03 NOTE — PROGRESS NOTE ADULT - ASSESSMENT
82F PMHx CVA 2017, HTN, s/p edson, s/p hysterectomy here with abd pain, found to have pneumatosis intestinalis, SBO. Code stroke.    IMP  #Pneumatosis intestinalis, SBO; due to colon mass    ct abd with pneumatosis intestinalis of cecum, ascending colon; sbo    s/p ex lap with resection 2/14; +colon mass; colostomy created    s/p second look lap 2/16    s/p closure 2/19    s/p washout 2/21    s/p closure with peg 3/1  #Code stroke    cth with bl thalami, occipitotemporal hypodensities    cta with severe stenosis L vert    mri with leptomeningeal enhancement, abnl signal diffuse    veeg +interictal activity 2/24  #Kleb pna    dta cx  kleb 2/15    bcx ntd  #L lung whiteout    resolved s/p bronch 2/28  #CVA  #HTN    PLAN  cefiderocol  dnr  potential compassionate extubation tm per primary team    Hellen  [phone]

## 2023-03-03 NOTE — PROGRESS NOTE ADULT - ATTENDING COMMENTS
Respiratory- remainson mechanical ventilation. mild settings. renal- good urine output, cr stable. Nutrition- on tube feeds. Sedation and pain control as needed. on antibiotics for AP. neuro- no change, not following commands. I had family meeting, they expressed to me that they would like for her to have a palliative extubation and be comfort only. no tracheostomy or interventions. they would like this to happen tomorrow. we will consult palliative care as well.

## 2023-03-03 NOTE — CONSULT NOTE ADULT - CONSULT REASON
Pre-op
symptom management
HEMODYNAMIC MONITORING POSTOPERATIVE REQUIRING VASOPRESSORS AND MECHANICAL VENTILATION
PNA
pre-op ex lap, possible resection, possible ostomy
ams

## 2023-03-03 NOTE — CONSULT NOTE ADULT - PROBLEM SELECTOR RECOMMENDATION 2
- poor prognosis for neurological recovery  - palliative extubation planned for tomorrow  - supportive care Yes

## 2023-03-03 NOTE — PROGRESS NOTE ADULT - ASSESSMENT
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,4,5 digit numbness) on aspirin and no other antiplatelet/anticoagulant agents,  GERD, HTN, HLD, chronic UTIs, pasty cholecystectomy(>20yrs ago), hysterectomy (>45yrs ago), and bladder lift (>30yrs ago) presented to the ED with SBO s/p diagnostic lap converted to exploratory laparotomy, sigmoidectomy, cecal enterotomy w/ stool expression and closure, descending colostomy, and abthera placement    - on TPN previously, d/c 2/24  - obesity, BMI 35.3  - at high risk for malnutrition and surgical complications in absence of adequate nutrition support  - s/p code stroke with CTH showing acute infarct/ischemic injury    PLAN  - cont feeding Peptamen Intense VHP at 40ml/hr for now  - plan palliative extubation tomorrow

## 2023-03-03 NOTE — PROGRESS NOTE ADULT - CRITICAL CARE SERVICES PROVIDED
Patient is critically ill, requiring critical care services.

## 2023-03-03 NOTE — CONSULT NOTE ADULT - CONSULT REQUESTED DATE/TIME
13-Feb-2023 19:48
14-Feb-2023 05:58
19-Feb-2023 05:43
23-Feb-2023 11:30
13-Feb-2023 12:54
03-Mar-2023

## 2023-03-03 NOTE — PROGRESS NOTE ADULT - SUBJECTIVE AND OBJECTIVE BOX
RUBIO HUGHES  [id number]  82y Female    Indication for ICU admission: open abdomen s/p bowel resection for SBO  Admit Date: 2/9  ICU Date:  2/14  OR Date: 2/14, 2/16    No Known Allergies    PAST MEDICAL & SURGICAL HISTORY:  Atrial fibrillation  Hypertension  CVA (cerebrovascular accident)  HLD (hyperlipidemia)  GERD (gastroesophageal reflux disease)  History of cholecystectomy  History of hysterectomy  History of bladder suspension procedure    24HRS EVENT:  3/2  Night  tolerating PS 10/8--> place back on AC  not following commands   2gm calcium gluc for 6.4    Day  2gm Deion gluconate  IVL   Family discussion between Neurology, family, and Dr. Rebollar  20 lasix IV  Pressure support trial 5:30 to 8:30      REVIEW OF SYSTEMS  [ ] A ten-point review of systems was otherwise negative except as noted.  [ x] Due to altered mental status/intubation, subjective information were not able to be obtained from the patient. History was obtained, to the extent possible, from review of the chart and collateral sources of information  ********************************************************************************************************  Daily     Diet, NPO with Tube Feed:   Tube Feeding Modality: Gastrostomy  Peptomen Intense VHP  Total Volume for 24 Hours (mL): 960  Continuous  Starting Tube Feed Rate mL per Hour: 10  Increase Tube Feed Rate by (mL): 10     Every 4 hours  Until Goal Tube Feed Rate (mL per Hour): 40  Tube Feed Duration (in Hours): 24  Tube Feed Start Time: 10:30 (03-01-23 @ 08:27)    CURRENT MEDS:  Neurologic Medications  acetaminophen     Tablet .. 1000 milliGRAM(s) Oral every 8 hours  fentaNYL    Injectable 25 MICROGram(s) IV Push every 4 hours PRN AGITATION/TACHYPNEA  gabapentin 100 milliGRAM(s) Oral every 8 hours  ondansetron Injectable 4 milliGRAM(s) IV Push every 6 hours PRN Nausea and/or Vomiting  oxyCODONE    IR 2.5 milliGRAM(s) Oral every 4 hours PRN Moderate Pain (4 - 6)  oxyCODONE    IR 5 milliGRAM(s) Oral every 4 hours PRN Severe Pain (7 - 10)    Respiratory Medications    Cardiovascular Medications  amLODIPine   Tablet 5 milliGRAM(s) Oral daily    Gastrointestinal Medications  calcium gluconate IVPB 2 Gram(s) IV Intermittent once  pantoprazole  Injectable 40 milliGRAM(s) IV Push every 24 hours    Genitourinary Medications    Hematologic/Oncologic Medications  aspirin  chewable 81 milliGRAM(s) Oral daily  heparin   Injectable 5000 Unit(s) SubCutaneous every 8 hours    Antimicrobial/Immunologic Medications  cefiderocol IVPB 1500 milliGRAM(s) IV Intermittent every 8 hours    Endocrine/Metabolic Medications  atorvastatin 80 milliGRAM(s) Oral at bedtime    Topical/Other Medications  chlorhexidine 0.12% Liquid 15 milliLiter(s) Oral Mucosa every 12 hours  chlorhexidine 2% Cloths 1 Application(s) Topical <User Schedule>  naloxone Injectable 0.4 milliGRAM(s) IV Push once    ICU Vital Signs Last 24 Hrs  T(C): 36.7 (02 Mar 2023 20:00), Max: 36.8 (02 Mar 2023 17:00)  T(F): 98.1 (02 Mar 2023 20:00), Max: 98.3 (02 Mar 2023 17:00)  HR: 92 (03 Mar 2023 01:00) (81 - 97)  BP: 118/57 (03 Mar 2023 01:00) (118/51 - 162/70)  BP(mean): 81 (03 Mar 2023 01:00) (73 - 102)  RR: 24 (03 Mar 2023 01:00) (17 - 28)  SpO2: 98% (03 Mar 2023 01:00) (98% - 100%)    O2 Parameters below as of 03 Mar 2023 01:00  Patient On (Oxygen Delivery Method): ventilator    O2 Concentration (%): 40    Mode: AC/ CMV (Assist Control/ Continuous Mandatory Ventilation)  RR (machine): 10  TV (machine): 320  FiO2: 40  PEEP: 8  ITime: 1  MAP: 12  PIP: 20    ABG - ( 02 Mar 2023 03:25 )  pH, Arterial: 7.39  pH, Blood: x     /  pCO2: 36    /  pO2: 95    / HCO3: 22    / Base Excess: -2.6  /  SaO2: 98.5      I&O's Summary  01 Mar 2023 07:01  -  02 Mar 2023 07:00  --------------------------------------------------------  IN: 3080 mL / OUT: 2060 mL / NET: 1020 mL    02 Mar 2023 07:01  -  03 Mar 2023 01:26  --------------------------------------------------------  IN: 1330 mL / OUT: 2100 mL / NET: -770 mL    I&O's Detail  01 Mar 2023 07:01  -  02 Mar 2023 07:00  --------------------------------------------------------  IN:    dextrose 5% + sodium chloride 0.45%: 1800 mL    IV PiggyBack: 500 mL    IV PiggyBack: 200 mL    IV PiggyBack: 100 mL    Vital High Protein: 480 mL  Total IN: 3080 mL    OUT:    Colostomy (mL): 850 mL    Indwelling Catheter - Urethral (mL): 920 mL    Other (mL): 90 mL    VAC (Vacuum Assisted Closure) System (mL): 200 mL  Total OUT: 2060 mL  Total NET: 1020 mL    02 Mar 2023 07:01  -  03 Mar 2023 01:26  --------------------------------------------------------  IN:    dextrose 5% + sodium chloride 0.45%: 270 mL    IV PiggyBack: 200 mL    IV PiggyBack: 100 mL    Vital High Protein: 760 mL  Total IN: 1330 mL    OUT:    Colostomy (mL): 300 mL    Indwelling Catheter - Urethral (mL): 1800 mL  Total OUT: 2100 mL    Total NET: -770 mL      PHYSICAL EXAM:   Neuro: awake, not alert. Non-verbal. Grimaces to pain applied to the upper extremities, does not follow commands. opens eyes to name "rebeca"  Resp: Intubated, vented (40/8)  Cards:  S1, S2.  Regular rate and rhythm.  Peripheral edema +++ pitting  GI: Abdomen soft, Non-tender, Ostomy is pink with stool and air in ostomy bag. Midline wound vac in place. abdomen soft, obese, dressings clean and dry  Extremities: Extremities warm, pink, edematous x4  Derm: Poor skin integrity- large skin tear/ degloving of right upper extremity  : female genitalia, urinary cath in place

## 2023-03-03 NOTE — CONSULT NOTE ADULT - PROBLEM SELECTOR RECOMMENDATION 4
- reviewed chart, GOC conversation held with daughter and son, and opting for palliative extubation tomorrow  - attempted to call son Ronaldo, unable to reach, left voicemail with palliative callback number  - recommendations for palliative extubation:    dilaudid 0.5mg IV Q15 mins PRN pain or dyspnea  ativan 0.5mg IV Q1 hour PRN agitation or dyspnea  robinul 0.4mg IV Q6 PRN    please give 1 dose of each 10-15 minutes prior to extubation    if goals are CMO, would stop: aspirin, heparin, abx, BP meds, lipitor, PPI, and ensure MOLST reflect current wishes    [phone] as needed  ______________  Maikel Menjivar MD  Palliative Medicine  [facility]   of Geriatric and Palliative Medicine  [phone]

## 2023-03-03 NOTE — CONSULT NOTE ADULT - SUBJECTIVE AND OBJECTIVE BOX
HPI: 82F with PMH of CVA, GERD, HTN, HLD, chronic UTIs, hysterectomy here with epigastric pain, found to have pneumatosis intestinalis and SBO, s/p ex-lap x 2 and closures, with course c/b code stroke, with CTH showing bialteral thalamic and occipitotemporal hypodensities, and klebsiella PNA. In setting of poor prognosis for recovery from stroke, family decided on palliative extubation, palliative consulted for assistance.    PERTINENT PM/SXH:   No pertinent past medical history    Atrial fibrillation    Hypertension    CVA (cerebrovascular accident)    HLD (hyperlipidemia)    GERD (gastroesophageal reflux disease)      History of cholecystectomy    History of hysterectomy    History of bladder suspension procedure      FAMILY HISTORY: cannot obtain from patient    ITEMS NOT CHECKED ARE NOT PRESENT    SOCIAL HISTORY:   Significant other/partner[ ]  Children[X ]  Scientologist/Spirituality:  Substance hx:  [ ]   Tobacco hx:  [ ]   Alcohol hx: [ ]   Living Situation: [ ]Home  [ ]Long term care  [ ]Rehab [ ]Other  Home Services: [ ] HHA [ ] Visting RN [ ] Hospice  Occupation:  Home Opioid hx:  [ ] Y [X ] N [ X] I-Stop Reference No: [id number]    ADVANCE DIRECTIVES:    [ ] Full Code [ ] DNR  MOLST  [ ]  Living Will  [ ]   DECISION MAKER(s):  [ ] Health Care Proxy(s)  [ ] Surrogate(s)  [ ] Guardian           Name(s): Phone Number(s): vasyl Higgins [phone]    BASELINE (I)ADL(s) (prior to admission):  [place]: [ ]Total  [ ] Moderate [ ]Dependent  Palliative Performance Status Version 2:         %    http://npcrc.org/files/news/palliative_performance_scale_ppsv2.pdf    Allergies    No Known Allergies    Intolerances    MEDICATIONS  (STANDING):  acetaminophen     Tablet .. 1000 milliGRAM(s) Oral every 8 hours  amLODIPine   Tablet 5 milliGRAM(s) Oral daily  aspirin  chewable 81 milliGRAM(s) Oral daily  atorvastatin 80 milliGRAM(s) Oral at bedtime  cefiderocol IVPB 1500 milliGRAM(s) IV Intermittent every 8 hours  chlorhexidine 0.12% Liquid 15 milliLiter(s) Oral Mucosa every 12 hours  chlorhexidine 2% Cloths 1 Application(s) Topical <User Schedule>  gabapentin 100 milliGRAM(s) Oral every 8 hours  heparin   Injectable 5000 Unit(s) SubCutaneous every 8 hours  naloxone Injectable 0.4 milliGRAM(s) IV Push once  pantoprazole  Injectable 40 milliGRAM(s) IV Push every 24 hours    MEDICATIONS  (PRN):  fentaNYL    Injectable 25 MICROGram(s) IV Push every 3 hours PRN AGITATION/TACHYPNEA  ondansetron Injectable 4 milliGRAM(s) IV Push every 6 hours PRN Nausea and/or Vomiting    PRESENT SYMPTOMS: [X ]Unable to obtain due to poor mentation   Source if other than patient:  [ ]Family   [ ]Team     Pain: [ ]yes [ ]no  QOL impact -   Location -                    Aggravating factors -  Quality -  Radiation -  Timing-  Severity (0-10 scale):  Minimal acceptable level (0-10 scale):     CPOT:    https://www.[email or web address]/getattachment/[email or web address]/Critical-Care-Pain-Observation-Tool-(CPOT)      PAIN AD Score: 0    http://geriatrictoolkit.[email or web address]/painad.pdf (press ctrl +  left click to view)    Dyspnea:                           [ ]Mild [ ]Moderate [ ]Severe (RDOS 2)  Anxiety:                             [ ]Mild [ ]Moderate [ ]Severe  Fatigue:                             [ ]Mild [ ]Moderate [ ]Severe  Nausea:                             [ ]Mild [ ]Moderate [ ]Severe  Loss of appetite:              [ ]Mild [ ]Moderate [ ]Severe  Constipation:                    [ ]Mild [ ]Moderate [ ]Severe    Other Symptoms:  [ ]All other review of systems negative     Palliative Performance Status Version 2:         %    http://npcrc.org/files/news/palliative_performance_scale_ppsv2.pdf  PHYSICAL EXAM:  Vital Signs Last 24 Hrs  T(C): 36.3 (03 Mar 2023 12:00), Max: 36.8 (02 Mar 2023 17:00)  T(F): 97.3 (03 Mar 2023 12:00), Max: 98.3 (02 Mar 2023 17:00)  HR: 90 (03 Mar 2023 14:00) (81 - 96)  BP: 145/64 (03 Mar 2023 14:00) (115/49 - 160/70)  BP(mean): 92 (03 Mar 2023 14:00) (71 - 100)  RR: 23 (03 Mar 2023 14:00) (17 - 25)  SpO2: 98% (03 Mar 2023 14:00) (95% - 100%)    Parameters below as of 03 Mar 2023 06:00  Patient On (Oxygen Delivery Method): ventilator    O2 Concentration (%): 40 I&O's Summary    02 Mar 2023 07:01  -  03 Mar 2023 07:00  --------------------------------------------------------  IN: 1730 mL / OUT: 2630 mL / NET: -900 mL    03 Mar 2023 07:01  -  03 Mar 2023 16:16  --------------------------------------------------------  IN: 0 mL / OUT: 405 mL / NET: -405 mL        GENERAL:  [ X] No acute distress [ ]Lethargic  [ ]Unarousable  [ ]Verbal  [ ]Non-Verbal [ ]Cachexia    BEHAVIORAL/PSYCH:  [ ]Alert and Oriented x  [ ] Anxiety [ ] Delirium [ ] Agitation [ X] Calm   EYES: [ ] No scleral icterus [ ] Scleral icterus [X ] Closed  ENMT:  [ ]Dry mouth  [ ]No oral lesions [X ] No external ear or nose lesions  CARDIOVASCULAR:  [ ]Regular [ ]Irregular [ ]Tachy [ ]Not Tachy  [ ]Nate [ ] Edema  PULMONARY:  [ ]Tachypnea  [ ]Audible excessive secretions [ X] No labored breathing [ ] labored breathing  GASTROINTESTINAL: [ ]Soft  [ ]Distended  [ X] Not distended [ ]Non tender [ ]Tender    MUSCULOSKELETAL: [ ]No clubbing [ ] clubbing  [X ] No cyanosis [ ] cyanosis  NEUROLOGIC: [ ]No focal deficits [ ] Follows commands [ ] Does not follow commands  [X ]Cognitive impairment  [ ]Dysphagia [ ]Dysarthria [ ]Paresis   GENITOURINARY: [ ]Normal [ ] Incontinent   [ ]Oliguria/Anuria   [ ]Donis  SKIN: [ ] Jaundiced [ X] Non-jaundiced [ ]Rash [ ]No Rash [ ] Warm [ ] Dry  MISC/LINES: [X ] ET tube [ ] Trach [ ]NGT/OGT [ ]PEG [ ]Donis    CRITICAL CARE:  [ ] Shock Present  [ ]Septic [ ]Cardiogenic [ ]Neurologic [ ]Hypovolemic  [ ]  Vasopressors [ ]  Inotropes   [ ]Respiratory failure present [ ]Mechanical ventilation [ ]Non-invasive ventilatory support [ ]High flow  [ ]Acute  [ ]Chronic [ ]Hypoxic  [ ]Hypercarbic [ ]Other  [ ]Other organ failure     LABS: reviewed                        10.4   11.77 )-----------( 561      ( 03 Mar 2023 03:56 )             33.1   03-02    143  |  112<H>  |  12  ----------------------------<  86  4.5   |  17  |  0.5<L>    Ca    6.4<L>      02 Mar 2023 23:35  Phos  2.2     03-02  Mg     2.0     03-02          RADIOLOGY & ADDITIONAL STUDIES:  MRI head 2/27/23  Diffusely abnormal signal and leptomeningeal enhancement involving the   bilateral occipital, temporal, thalami, cerebellar white matter as well   as the splenium of the corpus callosum in concert findings favor subacute   ischemic etiology given predominant involvement of the posterior   circulation though other etiologies including meningitis but, toxic   metabolic or neoplasm cannot entirely be excluded. Correlation with CSF   analysis is recommended.    Moderate chronic microvascular type changes as well as a chronic infarcts   involving the right parietal lobe and bilateral cerebellar hemispheres.  PROTEIN CALORIE MALNUTRITION PRESENT: [ ]mild [ ]moderate [ ]severe [ ]underweight [ ]morbid obesity  https://www.andeal.org/vault/6100/web/files/ONC/Table_Clinical%20Characteristics%20to%20Document%20Malnutrition-White%20JV%20et%20al%202012.pdf    Height (cm): 152.4 (02-28-23 @ 20:57)  Weight (kg): 82.1 (02-28-23 @ 20:57)  BMI (kg/m2): 35.3 (02-28-23 @ 20:57)    [ ]PPSV2 < or = to 30% [ ]significant weight loss  [ ]poor nutritional intake  [ ]anasarca      [ ]Artificial Nutrition      REFERRALS:   [ ]Chaplaincy  [ ]Hospice  [ ]Child Life  [ ]Social Work  [ ]Case management [ ]Holistic Therapy     Goals of Care Document:     ______________  Maikel Menjivar MD  Palliative Medicine  [facility]   of Geriatric and Palliative Medicine  [phone]

## 2023-03-03 NOTE — CHART NOTE - NSCHARTNOTEFT_GEN_A_CORE
SICU team met with family members Ronaldo Cowart (son) and Azra (sister) to discuss goals of care with family given poor prognosis. Family met with neurology attending yesterday to discuss prognosis for neurologic recovery. Today family decided they would like to undergo a palliative extubation tomorrow morning with family present and make patient comfortable. They do not want her to suffer any longer. Patient was made DNR at this time and palliative care was consulted. All questions answered at this time.

## 2023-03-03 NOTE — PROGRESS NOTE ADULT - SUBJECTIVE AND OBJECTIVE BOX
INTERVAL HPI/OVERNIGHT EVENTS:    SUBJECTIVE: Patient seen and examined at bedside.     cc: abd pain  unable to obtain ros  OBJECTIVE:    VITAL SIGNS:  Vital Signs Last 24 Hrs  T(C): 36.3 (03 Mar 2023 12:00), Max: 36.8 (02 Mar 2023 17:00)  T(F): 97.3 (03 Mar 2023 12:00), Max: 98.3 (02 Mar 2023 17:00)  HR: 86 (03 Mar 2023 12:00) (81 - 96)  BP: 126/56 (03 Mar 2023 12:00) (115/49 - 154/65)  BP(mean): 80 (03 Mar 2023 12:00) (71 - 97)  RR: 19 (03 Mar 2023 12:00) (17 - 25)  SpO2: 95% (03 Mar 2023 12:00) (95% - 100%)    Parameters below as of 03 Mar 2023 06:00  Patient On (Oxygen Delivery Method): ventilator    O2 Concentration (%): 40      PHYSICAL EXAM:    General: NAD  HEENT: NC/AT; PERRL, clear conjunctiva  Neck: supple  Respiratory: CTA b/l  Cardiovascular: +S1/S2; RRR  Abdomen: soft, NT/ND; +BS x4  Extremities: WWP, 2+ peripheral pulses b/l; no LE edema  Skin: normal color and turgor; no rash  Neurological:    MEDICATIONS:  MEDICATIONS  (STANDING):  acetaminophen     Tablet .. 1000 milliGRAM(s) Oral every 8 hours  amLODIPine   Tablet 5 milliGRAM(s) Oral daily  aspirin  chewable 81 milliGRAM(s) Oral daily  atorvastatin 80 milliGRAM(s) Oral at bedtime  cefiderocol IVPB 1500 milliGRAM(s) IV Intermittent every 8 hours  chlorhexidine 0.12% Liquid 15 milliLiter(s) Oral Mucosa every 12 hours  chlorhexidine 2% Cloths 1 Application(s) Topical <User Schedule>  gabapentin 100 milliGRAM(s) Oral every 8 hours  heparin   Injectable 5000 Unit(s) SubCutaneous every 8 hours  naloxone Injectable 0.4 milliGRAM(s) IV Push once  pantoprazole  Injectable 40 milliGRAM(s) IV Push every 24 hours    MEDICATIONS  (PRN):  fentaNYL    Injectable 25 MICROGram(s) IV Push every 6 hours PRN AGITATION/TACHYPNEA  ondansetron Injectable 4 milliGRAM(s) IV Push every 6 hours PRN Nausea and/or Vomiting      ALLERGIES:  Allergies    No Known Allergies    Intolerances        LABS:                        10.4   11.77 )-----------( 561      ( 03 Mar 2023 03:56 )             33.1     Hemoglobin: 10.4 g/dL (03-03 @ 03:56)  Hemoglobin: 11.0 g/dL (03-02 @ 00:40)  Hemoglobin: 11.8 g/dL (03-01 @ 02:13)  Hemoglobin: 7.3 g/dL (02-28 @ 11:37)  Hemoglobin: 7.4 g/dL (02-28 @ 05:32)    CBC Full  -  ( 03 Mar 2023 03:56 )  WBC Count : 11.77 K/uL  RBC Count : 3.75 M/uL  Hemoglobin : 10.4 g/dL  Hematocrit : 33.1 %  Platelet Count - Automated : 561 K/uL  Mean Cell Volume : 88.3 fL  Mean Cell Hemoglobin : 27.7 pg  Mean Cell Hemoglobin Concentration : 31.4 g/dL  Auto Neutrophil # : x  Auto Lymphocyte # : x  Auto Monocyte # : x  Auto Eosinophil # : x  Auto Basophil # : x  Auto Neutrophil % : x  Auto Lymphocyte % : x  Auto Monocyte % : x  Auto Eosinophil % : x  Auto Basophil % : x    03-02    143  |  112<H>  |  12  ----------------------------<  86  4.5   |  17  |  0.5<L>    Ca    6.4<L>      02 Mar 2023 23:35  Phos  2.2     03-02  Mg     2.0     03-02      Creatinine Trend: 0.5<--, 0.5<--, 0.5<--, 0.5<--, 0.5<--, 0.5<--        hs Troponin:    ABG - ( 03 Mar 2023 03:21 )  pH, Arterial: 7.47  pH, Blood: x     /  pCO2: 32    /  pO2: 184   / HCO3: 23    / Base Excess: 0.1   /  SaO2: 99.1                03:21 - ABG - pH: 7.47  |  pCO2: 32    |  pO2: 184   | Lactate:       | BE: 0.1          CSF:                      EKG:   MICROBIOLOGY:    IMAGING:      Labs, imaging, EKG personally reviewed    RADIOLOGY & ADDITIONAL TESTS: Reviewed.

## 2023-03-03 NOTE — CONSULT NOTE ADULT - ASSESSMENT
82F with PMH of CVA, GERD, HTN, HLD, chronic UTIs, hysterectomy here with epigastric pain, found to have pneumatosis intestinalis and SBO, s/p ex-lap x 2 and closures, with course c/b code stroke, with CTH showing bialteral thalamic and occipitotemporal hypodensities, and klebsiella PNA. In setting of poor prognosis for recovery from stroke, family decided on palliative extubation, palliative consulted for assistance.

## 2023-03-03 NOTE — CONSULT NOTE ADULT - PROBLEM SELECTOR RECOMMENDATION 3
- continue with vent for now until palliative extubation  - high risk, monitor settings  - currently on IV fentanyl PRN for tachypnea

## 2023-03-03 NOTE — PROGRESS NOTE ADULT - SUBJECTIVE AND OBJECTIVE BOX
NUTRITION SUPPORT TEAM  -  PROGRESS NOTE     Interval Events:  pt remains intubated, off sedation, min responsive  s/p GT placement  abd soft, ND, feeds restarted very slowly, tolerated, d/w RN at bedside this morning  palliative team to see pt     VITALS:  T(F): 98.7 (03-03 @ 16:22), Max: 98.7 (03-03 @ 16:22)  HR: 97 (03-03 @ 17:00) (81 - 100)  BP: 156/67 (03-03 @ 17:00) (126/56 - 174/72)  RR: 25 (03-03 @ 17:00) (19 - 27)  SpO2: 96% (03-03 @ 17:00) (95% - 98%)    HEIGHT/WEIGHT/BMI:   Height (cm): 152.4 (02-28)  Weight (kg): 82.1 (02-28)  BMI (kg/m2): 35.3 (02-28)    I/Os:     03-02-23 @ 07:01  -  03-03-23 @ 07:00  --------------------------------------------------------  IN:    dextrose 5% + sodium chloride 0.45%: 270 mL    IV PiggyBack: 300 mL    IV PiggyBack: 200 mL    Vital High Protein: 960 mL  Total IN: 1730 mL    OUT:    Colostomy (mL): 400 mL    Indwelling Catheter - Urethral (mL): 2080 mL    VAC (Vacuum Assisted Closure) System (mL): 150 mL  Total OUT: 2630 mL    Total NET: -900 mL    STANDING MEDICATIONS:   acetaminophen     Tablet .. 1000 milliGRAM(s) Oral every 8 hours  amLODIPine   Tablet 5 milliGRAM(s) Oral daily  aspirin  chewable 81 milliGRAM(s) Oral daily  atorvastatin 80 milliGRAM(s) Oral at bedtime  cefiderocol IVPB 1500 milliGRAM(s) IV Intermittent every 8 hours  chlorhexidine 0.12% Liquid 15 milliLiter(s) Oral Mucosa every 12 hours  chlorhexidine 2% Cloths 1 Application(s) Topical <User Schedule>  gabapentin 100 milliGRAM(s) Oral every 8 hours  heparin   Injectable 5000 Unit(s) SubCutaneous every 8 hours  naloxone Injectable 0.4 milliGRAM(s) IV Push once  pantoprazole  Injectable 40 milliGRAM(s) IV Push every 24 hours    LABS:                         10.4   11.77 )-----------( 561      ( 03 Mar 2023 03:56 )             33.1     143  |  112<H>  |  12  ----------------------------<  86          (03-02-23 @ 23:35)  4.5   |  17  |  0.5<L>    Ca    6.4<L>          (03-02-23 @ 23:35)  Phos  2.2         (03-02-23 @ 23:35)  Mg     2.0         (03-02-23 @ 23:35)    Triglycerides, Serum: 196 mg/dL (02-17 @ 09:21)    Blood Glucose (Past 24 hours):  119 mg/dL (03-03 @ 06:42)  109 mg/dL (03-03 @ 00:25)    DIET:   Diet, NPO with Tube Feed:   Tube Feeding Modality: Gastrostomy  Peptomen Intense VHP  Total Volume for 24 Hours (mL): 960  Continuous  Starting Tube Feed Rate mL per Hour: 10  Increase Tube Feed Rate by (mL): 10     Every 4 hours  Until Goal Tube Feed Rate (mL per Hour): 40  Tube Feed Duration (in Hours): 24  Tube Feed Start Time: 10:30 (03-01-23 @ 08:27) [Active]

## 2023-03-03 NOTE — PROGRESS NOTE ADULT - ASSESSMENT
Assessment and Plan:   82y female w/ sbo, sigmoid mass w/ pneumoperitoneum    OR#5 2/28- s/p skin closure with binding vicryl mesh, fascia left open. NGT and abthera removed during case. Peg placement  OR #4 2/21: abd washout, abthera placed   OR #3 2/19: abdominal washout, no further resection   OR #2 2/16: Previous cecostomy site noted to be ischemic, omental patch repair, rest of bowel no ischemic changes, bowel edematous, abthera vac in place, abdomen open  OR #1 2/14: s/p Sigmoid colectomy with ostomy, sigmoid mass resection for SBO/LBO.     NEURO:   #Acute pain    -APAP IV prn    -Fentanyl 25 mcg prn q4    -Gabapentin 100 q8    -Oxycodone PRN  #Acute sedation    -not on any sedation currently  #Code stroke 2/23     -CTH revealed patchy hypodensities in bilateral thalami and bilateral occipitotemporal regions like representing acute infarct/ischemic injury. No intracranial hemorrhage or midline shift.     -CTA: no LVO/AVM, left vertebral artery stenosis.    -2nd CTH completed > no significant change in edema involving b/l thalamic and occipital lobes     -EEG: mild-mod generalized slowing, bilateral focal slowing over posterior quadrants with shifting asymmetry, R>L, borderline independent left frontal focal slowing,     -< MR Head w/wo IV Cont (02.27.23 @ 21:22) > Diffusely abnormal signal and leptomeningeal enhancement involving the bilateral occipital, temporal, thalami, cerebellar white matter as well as the splenium of the corpus callosum in concert findings favor subacute ischemic etiology given predominant involvement of the posterior circulation though other etiologies including meningitis but, toxic metabolic or neoplasm cannot entirely be excluded. Correlation with CSF analysis is recommended. Moderate chronic microvascular type changes as well as a chronic infarcts   involving the right parietal lobe and bilateral cerebellar hemispheres.  < end of copied text >    RESP:   #Oxygenation    RR (machine): 10, TV (machine): 320, FiO2: 40, PEEP: 8, ITime: 1    03-02 @ 03:25 -- 7.39 / 36 / 95 / 22 / 98.5        SAT  98.5  Lac 0.80     -3/2 PM tolerated 6hr CPAP/PS     -ET  22cm    -Daily CXR and ABG while intubated    - SAT/SBT per protocol  #L side whiteout, 2/28    -s/p bronch 2/28    CARDS:   #hypotension 2/2 hypovolemic shock-resolved    -Prev on levophed, off since 2/20      Inpatient Rx: amLODIPine   Tablet 5 milliGRAM(s) Oral daily    #Imaging/labs    Echo 2/18:  elevated LA pressures, Lv not well visualized suspect normal lv function, suggest lumason, distal anteroseptal hypokinesis, sclerotic Aov    -Echo 2/27: LVEF 50-55%. Multiple regional wall motion abnormalities. 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  attempts to close lower portion resulted in elevated peak airway pressures. Application of abthera vac dressing    -RTOR 2/21 for abdominal washout and replacement of abthera   #Diet    Diet, NPO with Tube Feed:   Tube Feeding Modality: Gastrostomy  Peptomen Intense VHP  Total Volume for 24 Hours (mL): 960  Continuous  Starting Tube Feed Rate mL per Hour: 10  Increase Tube Feed Rate by (mL): 10     Every 4 hours  Until Goal Tube Feed Rate (mL per Hour): 40  Tube Feed Duration (in Hours): 24  Tube Feed Start Time: 10:30 (03-01-23 @ 08:27) [Active]    -NPO with VHP @40cc (goal as of 3/1 PM)    -Off TPN now   #GI Prophylaxis    -Pantoprazole  #Bowel regimen    -HOLDING    /RENAL:   #urine output in critically ill    -indwelling rodriguez (placed 2/14/2023)  #GERA  (baseline Cr 0.8) > resolved     -peak 1.4  #urine output in critically ill  Monitor UO-rodriguez in place   Labs:        BUN/Cr- 11/0.5  -->,  12/0.5  -->          Electrolytes-Na 143 // K 4.5 // Mg 2.0 //  Phos 2.2 (03-02 @ 23:35)  Daily BMP with electrolyte repletion  Appreciate strict I/Os    HEME/ONC:   Holding anticoagulation at this time, ASA 81   #DVT ppx: SCDs, HSQ  Hb/Hct:  7.4/24.3  -->,  7.3/24.4, 3 prbc  -->,  11.8/36.4  -->, 11.0/34.5 -->  Plts:  571  -->,  626  -->,  599  --> 565  PTT/INR: 19.6/0.94 (02-27)  T&S:ABO RH Interpretation:  (02-27)  T&S expires: 3/2    ID:  WBC- 17.33  --->>,  16.47  --->>,  14.79  --->>  Antibiotics-  Culture-  Temp trend- 24hrs T(F): 97.3 (03-02 @ 00:00), Max: 99 (03-01 @ 12:00)  Current antibiotics-cefiderocol IVPB 1500 every 8 hours  ID following: Abx for a total of 14 days since 2/19 --> stop 3/5 ? f/u id  Cx: BAL 2/28- GNR, cx pending    ENDO:  #Monitor for hyperglycemia     Glucose, Serum: 120 (03-02 @ 00:40)  Glucose, Serum: 86 (03-02 @ 23:35)    -FSG q6 while NPO    -Glucose goal 140-180    -if above 180 start ISS       LINES/DRAINS:  PIV, Rodriguez, ETT, Abdominal abthera vac, NGT. LIJ, LUE midline    ADVANCED DIRECTIVES:  Full Code    INDICATION FOR SICU: HEMODYNAMIC MONITORING POSTOPERATIVE REQUIRING VASOPRESSORS AND MECHANICAL VENTILATION     Dispo: SICU. Discussed with surgical attending Dr. Rebollar

## 2023-03-04 NOTE — PROGRESS NOTE ADULT - ASSESSMENT
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 SBO. No pneumoperitoneum.  Hosp course : SBO s/p diagnostic lap converted to exploratory laparotomy, sigmoidectomy, cecal enterotomy w/ stool expression and closure, descending colostomy, and abthera placement.    2/23 CT Brain Stroke Protocol : bilateral thalami and bilateral occipitotemporal with acute infarct/ischemic injury. No intracranial   hemorrhage or midline shift.    IMPRESSION;  Encephalopathy > non responsive off sedation  Neuro recs appreciated  2/24 EEG : generalized slowing, no Sz activity  Very poor prognosis  Bacterial PNA secondary to MDR Klebsiella  CXR : no new consolidation  2/15 BAL Klebsiella  WBC 26.7    PLAN:  Care per SICU  Plan for palliative extubation today  No change in ABx  Monitor WBC  Off loading to prevent pressure sores and preventive measures to avoid aspiration   Monitor vent settings and f/u resp status     Trauma/ACS  Spectra [phone]

## 2023-03-04 NOTE — PROGRESS NOTE ADULT - ASSESSMENT
Assessment and Plan:   82y female w/ sbo, sigmoid mass w/ pneumoperitoneum    OR#5 2/28- s/p skin closure with binding vicryl mesh, fascia left open. NGT and abthera removed during case. Peg placement  OR #4 2/21: abd washout, abthera placed   OR #3 2/19: abdominal washout, no further resection   OR #2 2/16: Previous cecostomy site noted to be ischemic, omental patch repair, rest of bowel no ischemic changes, bowel edematous, abthera vac in place, abdomen open  OR #1 2/14: s/p Sigmoid colectomy with ostomy, sigmoid mass resection for SBO/LBO.     NEURO:   #Acute pain    -APAP IV prn    -Fentanyl 25 mcg prn q3    -Gabapentin 100 q8  #Acute sedation    -precedex started 3/3 PM due to agitation  #Code stroke 2/23     -CTH revealed patchy hypodensities in bilateral thalami and bilateral occipitotemporal regions like representing acute infarct/ischemic injury. No intracranial hemorrhage or midline shift.     -CTA: no LVO/AVM, left vertebral artery stenosis.    -2nd CTH completed > no significant change in edema involving b/l thalamic and occipital lobes     -EEG: mild-mod generalized slowing, bilateral focal slowing over posterior quadrants with shifting asymmetry, R>L, borderline independent left frontal focal slowing,     -< MR Head w/wo IV Cont (02.27.23 @ 21:22) > Diffusely abnormal signal and leptomeningeal enhancement involving the bilateral occipital, temporal, thalami, cerebellar white matter as well as the splenium of the corpus callosum in concert findings favor subacute ischemic etiology. Moderate chronic microvascular type changes. chronic infarcts involving the right parietal lobe and bilateral cerebellar hemispheres.  < end of copied text >    RESP:   #Oxygenation    RR (machine): 10, TV (machine): 320, FiO2: 40, PEEP: 5, ITime: 1  ABG - ( 03 Mar 2023 03:21 )  pH, Arterial: 7.47  pH, Blood: x     /  pCO2: 32    /  pO2: 184   / HCO3: 23    / Base Excess: 0.1   /  SaO2: 99.1    - planning for pallative extubation 3/4    -ET  22cm size 7    -Daily CXR and ABG while intubated    - SAT/SBT per protocol  #L side whiteout, 2/28    -s/p bronch 2/28    CARDS:   #hypotension 2/2 hypovolemic shock-resolved    -Prev on levophed, off since 2/20      Inpatient Rx: amLODIPine   Tablet 5 milliGRAM(s) Oral daily  #Imaging/labs    Echo 2/18:  elevated LA pressures, Lv not well visualized suspect normal lv function, suggest lumason, distal anteroseptal hypokinesis, sclerotic Aov    -Echo 2/27: LVEF 50-55%. Multiple regional wall motion abnormalities. 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  attempts to close lower portion resulted in elevated peak airway pressures. Application of abthera vac dressing    -RTOR 2/21 for abdominal washout and replacement of abthera   #Diet    Diet, NPO with Tube Feed:   Tube Feeding Modality: Gastrostomy  Peptomen Intense VHP  Total Volume for 24 Hours (mL): 960  Continuous  Starting Tube Feed Rate mL per Hour: 10  Increase Tube Feed Rate by (mL): 10     Every 4 hours  Until Goal Tube Feed Rate (mL per Hour): 40  Tube Feed Duration (in Hours): 24  Tube Feed Start Time: 10:30 (03-01-23 @ 08:27) [Active]    -NPO with VHP @40cc (goal as of 3/1 PM)    -Off TPN now   #GI Prophylaxis    -Pantoprazole  #Bowel regimen    -HOLDING    /RENAL:   #urine output in critically ill    -indwelling rodriguez (placed 2/14/2023)  #GERA  (baseline Cr 0.8) > resolved     -peak 1.4  #urine output in critically ill  Monitor UO-rodriguez in place    Labs:          BUN/Cr- 14/<0.5  -->,  14/0.5  -->          Electrolytes-Na 141 // K 3.4 // Mg 1.7 //  Phos 1.8 (03-03 @ 22:20)  Daily BMP with electrolyte repletion  Appreciate strict I/Os    HEME/ONC:   Holding anticoagulation at this time, ASA 81   #DVT ppx: SCDs, HSQ  Hb/Hct:  7.4/24.3  -->,  7.3/24.4, 3 prbc  -->,  11.8/36.4  -->, 11.0/34.5 --> 10.4/33.1  -->,  12.3/39.1  -->        Plts:  571  -->,  626  -->,  599  --> 565 -->561  -->,  674  -->    PTT/INR: 19.6/0.94 (02-27)  T&S:ABO RH Interpretation:  (02-27)  T&S expires: 3/2    ID:  WBC- 14.79  --->>,  11.77  --->>,  7.01  --->>  Temp trend- 24hrs T(F): 99.1 (03-04 @ 02:00), Max: 100.6 (03-03 @ 23:50)  Current antibiotics-cefiderocol IVPB 1500 every 8 hours  ID following: Abx for a total of 14 days since 2/19 --> stop 3/5 ? f/u id  Cx: BAL 2/28- GNR, cx pending    ENDO:  #Monitor for hyperglycemia  Glucose, Serum: 97 (03-03 @ 22:20)     Glucose, Serum: 120 (03-02 @ 00:40)  Glucose, Serum: 86 (03-02 @ 23:35)    -FSG q6 while NPO    -Glucose goal 140-180    -if above 180 start ISS       LINES/DRAINS:  PIV, Rodriguez, ETT, Abdominal abthera vac, NGT. LIJ, LUE midline    ADVANCED DIRECTIVES:  Full Code    INDICATION FOR SICU: HEMODYNAMIC MONITORING POSTOPERATIVE REQUIRING VASOPRESSORS AND MECHANICAL VENTILATION     Dispo: SICU. Discussed with surgical attending     Assessment and Plan:   82y female w/ sbo, sigmoid mass w/ pneumoperitoneum    OR#5 2/28- s/p skin closure with binding vicryl mesh, fascia left open. NGT and abthera removed during case. Peg placement  OR #4 2/21: abd washout, abthera placed   OR #3 2/19: abdominal washout, no further resection   OR #2 2/16: Previous cecostomy site noted to be ischemic, omental patch repair, rest of bowel no ischemic changes, bowel edematous, abthera vac in place, abdomen open  OR #1 2/14: s/p Sigmoid colectomy with ostomy, sigmoid mass resection for SBO/LBO.     NEURO:   #Acute pain    -APAP IV prn    -Fentanyl 25 mcg prn q3    -Gabapentin 100 q8  #Acute sedation    -precedex started 3/3 PM due to agitation  #Code stroke 2/23     -CTH revealed patchy hypodensities in bilateral thalami and bilateral occipitotemporal regions like representing acute infarct/ischemic injury. No intracranial hemorrhage or midline shift.     -CTA: no LVO/AVM, left vertebral artery stenosis.    -2nd CTH completed > no significant change in edema involving b/l thalamic and occipital lobes     -EEG: mild-mod generalized slowing, bilateral focal slowing over posterior quadrants with shifting asymmetry, R>L, borderline independent left frontal focal slowing,     -< MR Head w/wo IV Cont (02.27.23 @ 21:22) > Diffusely abnormal signal and leptomeningeal enhancement involving the bilateral occipital, temporal, thalami, cerebellar white matter as well as the splenium of the corpus callosum in concert findings favor subacute ischemic etiology. Moderate chronic microvascular type changes. chronic infarcts involving the right parietal lobe and bilateral cerebellar hemispheres.  < end of copied text >    RESP:   #Oxygenation    RR (machine): 10, TV (machine): 320, FiO2: 40, PEEP: 5, ITime: 1  ABG - ( 03 Mar 2023 03:21 )  pH, Arterial: 7.47  pH, Blood: x     /  pCO2: 32    /  pO2: 184   / HCO3: 23    / Base Excess: 0.1   /  SaO2: 99.1    - planning for pallative extubation 3/4    -ET  22cm size 7    -Daily CXR and ABG while intubated    - SAT/SBT per protocol  #L side whiteout, 2/28    -s/p bronch 2/28    CARDS:   #hypotension 2/2 hypovolemic shock-resolved    -Prev on levophed, off since 2/20      Inpatient Rx: amLODIPine   Tablet 5 milliGRAM(s) Oral daily  #Imaging/labs    Echo 2/18:  elevated LA pressures, Lv not well visualized suspect normal lv function, suggest lumason, distal anteroseptal hypokinesis, sclerotic Aov    -Echo 2/27: LVEF 50-55%. Multiple regional wall motion abnormalities. 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  attempts to close lower portion resulted in elevated peak airway pressures. Application of abthera vac dressing    -RTOR 2/21 for abdominal washout and replacement of abthera   #Diet    Diet, NPO with Tube Feed:   Tube Feeding Modality: Gastrostomy  Peptomen Intense VHP  Total Volume for 24 Hours (mL): 960  Continuous  Starting Tube Feed Rate mL per Hour: 10  Increase Tube Feed Rate by (mL): 10     Every 4 hours  Until Goal Tube Feed Rate (mL per Hour): 40  Tube Feed Duration (in Hours): 24  Tube Feed Start Time: 10:30 (03-01-23 @ 08:27) [Active]    -NPO with VHP @40cc (goal as of 3/1 PM)    -Off TPN now   #GI Prophylaxis    -Pantoprazole  #Bowel regimen    -HOLDING    /RENAL:   #urine output in critically ill    -indwelling rodriguez (placed 2/14/2023)  #GERA  (baseline Cr 0.8) > resolved     -peak 1.4  #urine output in critically ill  Monitor UO-rodriguez in place    Labs:          BUN/Cr- 14/<0.5  -->,  14/0.5  -->          Electrolytes-Na 141 // K 3.4 // Mg 1.7 //  Phos 1.8 (03-03 @ 22:20)  Daily BMP with electrolyte repletion  Appreciate strict I/Os    HEME/ONC:   Holding anticoagulation at this time, ASA 81   #DVT ppx: SCDs, HSQ  Hb/Hct:  7.4/24.3  -->,  7.3/24.4, 3 prbc  -->,  11.8/36.4  -->, 11.0/34.5 --> 10.4/33.1  -->,  12.3/39.1  -->        Plts:  571  -->,  626  -->,  599  --> 565 -->561  -->,  674  -->    PTT/INR: 19.6/0.94 (02-27)  T&S:ABO RH Interpretation:  (02-27)  T&S expires: 3/2    ID:  WBC- 14.79  --->>,  11.77  --->>,  7.01  --->>  Temp trend- 24hrs T(F): 99.1 (03-04 @ 02:00), Max: 100.6 (03-03 @ 23:50)  Current antibiotics-cefiderocol IVPB 1500 every 8 hours  ID following: Abx for a total of 14 days since 2/19 --> stop 3/5 ? f/u id  Cx: BAL 2/28- GNR, cx pending    ENDO:  #Monitor for hyperglycemia  Glucose, Serum: 97 (03-03 @ 22:20)     Glucose, Serum: 120 (03-02 @ 00:40)  Glucose, Serum: 86 (03-02 @ 23:35)    -FSG q6 while NPO    -Glucose goal 140-180    -if above 180 start ISS    Advance Directives  Plan for palliative extubation today as per family wishes  Palliative care instructions followed  MOLST form updated to reflect wishes for comfort care and DNR/DNI       LINES/DRAINS:  Rodriguez, ETT, Abdominal abthera vac, LUE midline x 2      Dispo: SICU.  +   Discussed with Dr. Rebollar

## 2023-03-04 NOTE — PROGRESS NOTE ADULT - SUBJECTIVE AND OBJECTIVE BOX
RUBIO HUGHES  [id number]  82y Female    Indication for ICU admission: open abdomen s/p bowel resection for SBO  Admit Date: 2/9  ICU Date:  2/14  OR Date: 2/14, 2/16    No Known Allergies    PAST MEDICAL & SURGICAL HISTORY:  Atrial fibrillation  Hypertension  CVA (cerebrovascular accident)  HLD (hyperlipidemia)  GERD (gastroesophageal reflux disease)  History of cholecystectomy  History of hysterectomy  History of bladder suspension procedure    24HRS EVENT:  3/3  Night  Palliative extubation tomorrow   Kphos, mag, and ca repleted   precedex restarted for agitaiton     Day  - P/S--> low VTE, placed back on vent  - Thick secretions  - DC rodriguez after lasix  - family discussion  - Lasix 20 IV  - Plan for palliative extubation tomorrow, DNR     REVIEW OF SYSTEMS  [ ] A ten-point review of systems was otherwise negative except as noted.  [ x] Due to altered mental status/intubation, subjective information were not able to be obtained from the patient. History was obtained, to the extent possible, from review of the chart and collateral sources of information  ********************************************************************************************************   RUBIO HUGHES  [id number]  82y Female    Indication for ICU admission: open abdomen s/p bowel resection for SBO  Admit Date: 2/9  ICU Date:  2/14  OR Date: 2/14, 2/16    No Known Allergies    PAST MEDICAL & SURGICAL HISTORY:  Atrial fibrillation  Hypertension  CVA (cerebrovascular accident)  HLD (hyperlipidemia)  GERD (gastroesophageal reflux disease)  History of cholecystectomy  History of hysterectomy  History of bladder suspension procedure    24HRS EVENT:  3/3  Night  Palliative extubation tomorrow   Kphos, mag, and ca repleted   precedex restarted for agitation     Day  - P/S--> low VTE, placed back on vent  - Thick secretions  - DC rodriguez after lasix  - family discussion  - Lasix 20 IV  - Plan for palliative extubation tomorrow, DNR    Physical Exam:  Neuro-  GCS 4T, opens eyes intermittently to voice and pain, grimaces to pain intermittently, no spontaneous movement, not withdrawing to pain  Resp- fine crackles in the loner lobes, no wheezing, no rhonchi  Cardiac- S1, S2, RRR, no murmur  Abd- VAC dressing to midline, PEG tube in place- stable position 5.5 at hub, hypoactive bowel sounds  MSK- pitting edema to lower and upper extremities, distal pulses intact 2+    REVIEW OF SYSTEMS  [ ] A ten-point review of systems was otherwise negative except as noted.  [ x] Due to altered mental status/intubation, subjective information were not able to be obtained from the patient. History was obtained, to the extent possible, from review of the chart and collateral sources of information  ********************************************************************************************************

## 2023-03-04 NOTE — PROGRESS NOTE ADULT - SUBJECTIVE AND OBJECTIVE BOX
GENERAL SURGERY PROGRESS NOTE    Patient: RUBIO HUGHES , 82y ([birth year])Female   MRN: [id number]  Location: [address]  Visit: 02-10-23 Inpatient  Date: 03-04-23 @ 01:46    Events of past 24 hours:  Patient seen and examined at bedside  Vent settings 320/10/40/5  Patient got one fentanyl push, was biting on the ET tube    PAST MEDICAL & SURGICAL HISTORY:  Atrial fibrillation      Hypertension      CVA (cerebrovascular accident)      HLD (hyperlipidemia)      GERD (gastroesophageal reflux disease)      History of cholecystectomy      History of hysterectomy      History of bladder suspension procedure          Vitals:   T(F): 100.6 (03-03-23 @ 23:50), Max: 100.6 (03-03-23 @ 23:50)  HR: 118 (03-03-23 @ 23:00)  BP: 138/65 (03-03-23 @ 23:00)  RR: 35 (03-03-23 @ 23:00)  SpO2: 95% (03-03-23 @ 23:00)  Mode: AC/ CMV (Assist Control/ Continuous Mandatory Ventilation), RR (machine): 10, TV (machine): 320, FiO2: 40, PEEP: 5, ITime: 1, MAP: 10, PIP: 29    Diet, NPO with Tube Feed:   Tube Feeding Modality: Gastrostomy  Peptomen Intense VHP  Total Volume for 24 Hours (mL): 960  Continuous  Starting Tube Feed Rate mL per Hour: 10  Increase Tube Feed Rate by (mL): 10     Every 4 hours  Until Goal Tube Feed Rate (mL per Hour): 40  Tube Feed Duration (in Hours): 24  Tube Feed Start Time: 10:30      Fluids:     I & O's:    03-02-23 @ 07:01  -  03-03-23 @ 07:00  --------------------------------------------------------  IN:    dextrose 5% + sodium chloride 0.45%: 270 mL    IV PiggyBack: 300 mL    IV PiggyBack: 200 mL    Vital High Protein: 960 mL  Total IN: 1730 mL    OUT:    Colostomy (mL): 400 mL    Indwelling Catheter - Urethral (mL): 2080 mL    VAC (Vacuum Assisted Closure) System (mL): 150 mL  Total OUT: 2630 mL    Total NET: -900 mL    PHYSICAL EXAM:  Neuro: awake, not alert. Non-verbal. Grimaces to pain applied to the upper extremities  Resp: Intubated, vented (40/8)  Cards:  S1, S2.  Regular rate and rhythm.  Peripheral edema +++ pitting  GI: Abdomen soft, Non-tender, Ostomy is pink with stool and air in ostomy bag. Midline wound vac in place  Extremities: Extremities warm, pink, well-perfused.  Derm: Poor skin integrity- large skin tear/ degloving of right upper extremity    MEDICATIONS  (STANDING):  acetaminophen     Tablet .. 1000 milliGRAM(s) Oral every 8 hours  amLODIPine   Tablet 5 milliGRAM(s) Oral daily  aspirin  chewable 81 milliGRAM(s) Oral daily  atorvastatin 80 milliGRAM(s) Oral at bedtime  cefiderocol IVPB 1500 milliGRAM(s) IV Intermittent every 8 hours  chlorhexidine 0.12% Liquid 15 milliLiter(s) Oral Mucosa every 12 hours  chlorhexidine 2% Cloths 1 Application(s) Topical <User Schedule>  dexMEDEtomidine Infusion 0.05 MICROgram(s)/kG/Hr (1.03 mL/Hr) IV Continuous <Continuous>  gabapentin 100 milliGRAM(s) Oral every 8 hours  heparin   Injectable 5000 Unit(s) SubCutaneous every 8 hours  naloxone Injectable 0.4 milliGRAM(s) IV Push once  pantoprazole  Injectable 40 milliGRAM(s) IV Push every 24 hours    MEDICATIONS  (PRN):  fentaNYL    Injectable 25 MICROGram(s) IV Push every 3 hours PRN AGITATION/TACHYPNEA  ondansetron Injectable 4 milliGRAM(s) IV Push every 6 hours PRN Nausea and/or Vomiting      DVT PROPHYLAXIS: heparin   Injectable 5000 Unit(s) SubCutaneous every 8 hours    GI PROPHYLAXIS: pantoprazole  Injectable 40 milliGRAM(s) IV Push every 24 hours    ANTICOAGULATION:   ANTIBIOTICS:  cefiderocol IVPB 1500 milliGRAM(s)            LAB/STUDIES:  Labs:  CAPILLARY BLOOD GLUCOSE      POCT Blood Glucose.: 119 mg/dL (03 Mar 2023 06:42)                          12.3   7.01  )-----------( 674      ( 03 Mar 2023 22:20 )             39.1       Auto Neutrophil %: 91.3 % (03-03-23 @ 22:20)    03-03    141  |  103  |  14  ----------------------------<  97  3.4<L>   |  26  |  0.5<L>      Magnesium, Serum: 1.7 mg/dL (03-03-23 @ 22:20)      LFTs:     Blood Gas Arterial, Lactate: 0.70 mmol/L (03-03-23 @ 03:21)  Blood Gas Arterial, Lactate: 0.80 mmol/L (03-02-23 @ 03:25)    ABG - ( 03 Mar 2023 03:21 )  pH: 7.47  /  pCO2: 32    /  pO2: 184   / HCO3: 23    / Base Excess: 0.1   /  SaO2: 99.1            ABG - ( 02 Mar 2023 03:25 )  pH: 7.39  /  pCO2: 36    /  pO2: 95    / HCO3: 22    / Base Excess: -2.6  /  SaO2: 98.5            ABG - ( 27 Feb 2023 11:42 )  pH: 7.46  /  pCO2: 36    /  pO2: 91    / HCO3: 26    / Base Excess: 1.7   /  SaO2: 99.4

## 2023-03-04 NOTE — CHART NOTE - NSCHARTNOTEFT_GEN_A_CORE
Spoke with patients vasyl Jarrett and vasyl More via phone ([phone]) with extended family regarding GOC with Dr. Smooth iglesias. All in agreement that patient would not want to remain mechanically ventilated and prolong her suffering. At the end of the discussion family has opted to proceed with palliative extubation and comfort measures only. Dilaudid and ativan for anxiety/pain have been ordered. MOLST form updated to include DNR/DNI and comfort measure decision. Plan to extubate post chaplaincy service approx 11:15am.

## 2023-03-04 NOTE — PROGRESS NOTE ADULT - ATTENDING SUPERVISION STATEMENT
Resident
Resident/Fellow
Resident
Resident/Fellow
Resident/Fellow
Resident
Resident/Fellow

## 2023-03-05 NOTE — DISCHARGE NOTE FOR THE EXPIRED PATIENT - HOSPITAL COURSE
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 She has had nausea and emesis which she described as bilious associated with the pain. She has had no bowel movements for the past three days but states that she has had flatus as soon as today.  She reports having diarrhea five days ago. Patient denied hematochezia, hematemesis, chest pain, cough change from chronic baseline, fever, sick contacts, nor urinary symptoms. On CT scan patient was found to have Pneumatosis of the cecum and ascending colon and SBO.  Patient continuously refused NGT and did not improve clinically. She was taken to the OR for ex-lap, abdominal washout, and creation of sigmoid colectomy. 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 Patient remained intubated with an open abdomen and required vasopressors. Patient was fluid resuscitated and was weaned off vasopressors. Patient underwent multiple returns to the OR for abdominal washout and attempted abdominal closure. Patient was diuresed in attempt to be able to close abdomen. Patient finally underwent skin closure with binding vicryl mesh, fascia left open. Patient developed Carbapenem resistant Klebsiella Pneumonia. Patient became unresponsive with NIH 23 and code stroke was called. Patient had Patchy hypodensities in bilateral thalami and bilateral occipitotemporal regions like representing acute infarct/ischemic injury. Neurology followed patient. Patient had no improvement in mental status. Neurology and Dr. Rebollar had discussions with the family about patients prognosis. After multiple discussions family decided to make patient Comfort measures only. Palliative care was consulted. Patient underwent palliative extubation on 3/4 @12:10 PM with family at bedside. Patient now downgraded to floor as CMO. Palliative was reconsulted on 3/5 for recommendations related to CMO management. Palliative recommending increasing dosage of dilaudid to 1mg every 30 minutes as needed. Patient  @1600 on 3/5/23.  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 She has had nausea and emesis which she described as bilious associated with the pain. She has had no bowel movements for the past three days but states that she has had flatus as soon as today.  She reports having diarrhea five days ago. Patient denied hematochezia, hematemesis, chest pain, cough change from chronic baseline, fever, sick contacts, nor urinary symptoms. On CT scan patient was found to have Pneumatosis of the cecum and ascending colon and SBO.  Patient continuously refused NGT and did not improve clinically. She was taken to the OR for ex-lap, abdominal washout, and creation of sigmoid colectomy. 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 Patient remained intubated with an open abdomen and required vasopressors. Patient was fluid resuscitated and was weaned off vasopressors. Patient underwent multiple returns to the OR for abdominal washout and attempted abdominal closure. Patient was diuresed in attempt to be able to close abdomen. Patient finally underwent skin closure with binding vicryl mesh, fascia left open. Patient developed Carbapenem resistant Klebsiella Pneumonia. Patient became unresponsive with NIH 23 and code stroke was called. Patient had Patchy hypodensities in bilateral thalami and bilateral occipitotemporal regions like representing acute infarct/ischemic injury. Neurology followed patient. Patient had no improvement in mental status. Neurology and Dr. Rebollar had discussions with the family about patients prognosis. After multiple discussions family decided to make patient Comfort measures only. Palliative care was consulted. Patient underwent palliative extubation on 3/4 @12:10 PM with family at bedside. Patient now downgraded to floor as CMO. Palliative was reconsulted on 3/5 for recommendations related to CMO management. Palliative recommending increasing dosage of dilaudid to 1mg every 30 minutes as needed. Patient  @1600 on 3/5/23.     Medical examiner of [place] contacted - case number [id number]

## 2023-03-05 NOTE — PROGRESS NOTE ADULT - PROVIDER SPECIALTY LIST ADULT
Internal Medicine
Neurology
Nutrition Support
SICU
Surgery
Trauma Surgery
Internal Medicine
Neurology
SICU
Surgery
Trauma Surgery
Nutrition Support
SICU
Surgery
Internal Medicine
Infectious Disease
Infectious Disease

## 2023-03-05 NOTE — CHART NOTE - NSCHARTNOTEFT_GEN_A_CORE
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 She has had nausea and emesis which she described as bilious associated with the pain. She has had no bowel movements for the past three days but states that she has had flatus as soon as today.  She reports having diarrhea five days ago. Patient denied hematochezia, hematemesis, chest pain, cough change from chronic baseline, fever, sick contacts, nor urinary symptoms. On CT scan patient was found to have Pneumatosis of the cecum and ascending colon and SBO.  Patient continuously refused NGT and did not improve clinically. She was taken to the OR for ex-lap, abdominal washout, and creation of sigmoid colectomy. 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 Patient remained intubated with an open abdomen and required vasopressors. Patient was fluid resuscitated and was weaned off vasopressors. Patient underwent multiple returns to the OR for abdominal washout and attempted abdominal closure. Patient was diuresed in attempt to be able to close abdomen. Patient finally underwent skin closure with binding vicryl mesh, fascia left open. Patient developed Carbapenem resistant Klebsiella Pneumonia. Patient became unresponsive with NIH 23 and code stroke was called. Patient had Patchy hypodensities in bilateral thalami and bilateral occipitotemporal regions like representing acute infarct/ischemic injury. Neurology followed patient. Patient had no improvement in mental status. Neurology and Dr. Rebollar had discussions with the family about patients prognosis. After multiple discussions family decided to make patient Comfort measures only. Palliative care was consulted. Patient underwent palliative extubation on 3/4 @12:10 PM with family at bedside. PAtient now downgraded to floor as CMO.         PAST MEDICAL & SURGICAL HISTORY:  Atrial fibrillation      Hypertension      CVA (cerebrovascular accident)      HLD (hyperlipidemia)      GERD (gastroesophageal reflux disease)      History of cholecystectomy      History of hysterectomy      History of bladder suspension procedure        Allergies    No Known Allergies    Intolerances      MEDICATIONS  (STANDING):    MEDICATIONS  (PRN):  glycopyrrolate Injectable 0.2 milliGRAM(s) IV Push every 6 hours PRN excesss secretion/cmo  HYDROmorphone  Injectable 0.5 milliGRAM(s) IV Push every 15 minutes PRN Severe Pain (7 - 10)  LORazepam   Injectable 1 milliGRAM(s) IV Push every 15 minutes PRN Agitation        Parameters below as of 04 Mar 2023 20:00  Patient On (Oxygen Delivery Method): room air    Assessment:  82y female w/ sbo, sigmoid mass w/ pneumoperitoneum    OR#5 2/28- s/p skin closure with binding vicryl mesh, fascia left open. NGT and abthera removed during case. Peg placement  OR #4 2/21: abd washout, abthera placed   OR #3 2/19: abdominal washout, no further resection   OR #2 2/16: Previous cecostomy site noted to be ischemic, omental patch repair, rest of bowel no ischemic changes, bowel edematous, abthera vac in place, abdomen open  OR #1 2/14: s/p Sigmoid colectomy with ostomy, sigmoid mass resection for SBO/LBO.   Code stroke 2/23: CTH revealed patchy hypodensities in bilateral thalami and bilateral occipitotemporal regions like representing acute infarct/ischemic injury.     Plan:   - Patient remains Comfort measures only, DNR/DNI  - Extubated on 3/4  - Dilaudid 0.5mg IV Q15 mins PRN pain or dyspnea  - Ativan 0.5mg IV Q1 hour PRN agitation or dyspnea  - Robinul 0.4mg IV Q6 PRN  - Please call son Ronaldo with any changes in status        Signed out to Dr. Moya on 3/4

## 2023-03-05 NOTE — DISCHARGE NOTE FOR THE EXPIRED PATIENT - SECONDARY DIAGNOSIS.
SBO (small bowel obstruction) CVA (cerebrovascular accident) GERA (acute kidney injury) Moderate dehydration

## 2023-03-05 NOTE — PROGRESS NOTE ADULT - SUBJECTIVE AND OBJECTIVE BOX
GENERAL SURGERY PROGRESS NOTE    Admission: Other specified disorders of intestines    24 Hour Events:  S/p palliative extubation  CMO begun  Downgraded to the floor    Vitals:  T(F): 97 (03-04-23 @ 20:00), Max: 98.4 (03-04-23 @ 08:00)  HR: 95 (03-04-23 @ 22:00)  BP: 179/74 (03-04-23 @ 22:00)  RR: 25 (03-04-23 @ 16:00)  SpO2: 88% (03-04-23 @ 22:00)  Mode: AC/ CMV (Assist Control/ Continuous Mandatory Ventilation), RR (machine): 10, TV (machine): 320, FiO2: 40, PEEP: 5, ITime: 1, MAP: 12, PIP: 20    Fluids:     I & O's:    03-03-23 @ 07:01  -  03-04-23 @ 07:00  --------------------------------------------------------  IN:    Dexmedetomidine: 40.8 mL    IV PiggyBack: 166.5 mL    IV PiggyBack: 499.8 mL    IV PiggyBack: 50 mL    IV PiggyBack: 200 mL    IV PiggyBack: 100 mL    Vital High Protein: 440 mL  Total IN: 1497.1 mL    OUT:    Indwelling Catheter - Urethral (mL): 935 mL  Total OUT: 935 mL    Total NET: 562.1 mL    MEDICATIONS  (STANDING):    MEDICATIONS  (PRN):  glycopyrrolate Injectable 0.2 milliGRAM(s) IV Push every 6 hours PRN excesss secretion/cmo  HYDROmorphone  Injectable 0.5 milliGRAM(s) IV Push every 15 minutes PRN Severe Pain (7 - 10)  LORazepam   Injectable 1 milliGRAM(s) IV Push every 15 minutes PRN Agitation    DVT PROPHYLAXIS:   GI PROPHYLAXIS:   ANTICOAGULATION:   ANTIBIOTICS:      LAB/STUDIES:  Labs:  CAPILLARY BLOOD GLUCOSE               12.3   7.01  )-----------( 674      ( 03 Mar 2023 22:20 )             39.1         03-03    141  |  103  |  14  ----------------------------<  97  3.4<L>   |  26  |  0.5<L>    LFTs:     Blood Gas Arterial, Lactate: 0.70 mmol/L (03-04-23 @ 05:49)  Blood Gas Arterial, Lactate: 0.70 mmol/L (03-03-23 @ 03:21)    ABG - ( 04 Mar 2023 05:49 )  pH: 7.43  /  pCO2: 38    /  pO2: 86    / HCO3: 25    / Base Excess: 1.0   /  SaO2: 98.3      ABG - ( 03 Mar 2023 03:21 )  pH: 7.47  /  pCO2: 32    /  pO2: 184   / HCO3: 23    / Base Excess: 0.1   /  SaO2: 99.1      ABG - ( 02 Mar 2023 03:25 )  pH: 7.39  /  pCO2: 36    /  pO2: 95    / HCO3: 22    / Base Excess: -2.6  /  SaO2: 98.5      Coags:

## 2023-03-05 NOTE — PROGRESS NOTE ADULT - ASSESSMENT
Assessment:  82y female w/ sbo, sigmoid mass w/ pneumoperitoneum    OR#5 2/28- s/p skin closure with binding vicryl mesh, fascia left open. NGT and abthera removed during case. Peg placement  OR #4 2/21: abd washout, abthera placed   OR #3 2/19: abdominal washout, no further resection   OR #2 2/16: Previous cecostomy site noted to be ischemic, omental patch repair, rest of bowel no ischemic changes, bowel edematous, abthera vac in place, abdomen open  OR #1 2/14: s/p Sigmoid colectomy with ostomy, sigmoid mass resection for SBO/LBO.   Code stroke 2/23: CTH revealed patchy hypodensities in bilateral thalami and bilateral occipitotemporal regions like representing acute infarct/ischemic injury.     Plan:   - Patient remains Comfort measures only, DNR/DNI  - Extubated on 3/4 (palliative)  - Dilaudid 0.5mg IV Q15 mins PRN pain or dyspnea  - Ativan 0.5mg IV Q1 hour PRN agitation or dyspnea  - Robinul 0.4mg IV Q6 PRN  - Please call vasyl Higgins with any changes in status    Trauma ACS  SPECTRA [id number]

## 2023-03-05 NOTE — CHART NOTE - NSCHARTNOTEFT_GEN_A_CORE
Spoke with Palliative team - Dr. Menjivar  We discussed patient's current respiratory distress and the ineffectiveness of current regimen  Dr. Menjivar's new recommendation includes increasing dilaudid dose to 1mg every 30 minutes as needed.   Will make the change now and follow up

## 2023-03-13 DIAGNOSIS — K63.89 OTHER SPECIFIED DISEASES OF INTESTINE: ICD-10-CM

## 2023-03-13 DIAGNOSIS — G93.40 ENCEPHALOPATHY, UNSPECIFIED: ICD-10-CM

## 2023-03-13 DIAGNOSIS — Z51.5 ENCOUNTER FOR PALLIATIVE CARE: ICD-10-CM

## 2023-03-13 DIAGNOSIS — E83.42 HYPOMAGNESEMIA: ICD-10-CM

## 2023-03-13 DIAGNOSIS — R29.723 NIHSS SCORE 23: ICD-10-CM
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
## 2023-03-13 DIAGNOSIS — B96.1 KLEBSIELLA PNEUMONIAE [K. PNEUMONIAE] AS THE CAUSE OF DISEASES CLASSIFIED ELSEWHERE: ICD-10-CM

## 2023-03-13 DIAGNOSIS — E44.0 MODERATE PROTEIN-CALORIE MALNUTRITION: ICD-10-CM

## 2023-03-13 DIAGNOSIS — C18.7 MALIGNANT NEOPLASM OF SIGMOID COLON: ICD-10-CM

## 2023-03-13 DIAGNOSIS — Z90.710 ACQUIRED ABSENCE OF BOTH CERVIX AND UTERUS: ICD-10-CM

## 2023-03-13 DIAGNOSIS — Z79.82 LONG TERM (CURRENT) USE OF ASPIRIN: ICD-10-CM

## 2023-03-13 DIAGNOSIS — Z66 DO NOT RESUSCITATE: ICD-10-CM

## 2023-03-13 DIAGNOSIS — N17.9 ACUTE KIDNEY FAILURE, UNSPECIFIED: ICD-10-CM

## 2023-03-13 DIAGNOSIS — E78.5 HYPERLIPIDEMIA, UNSPECIFIED: ICD-10-CM

## 2023-03-13 DIAGNOSIS — K56.609 UNSPECIFIED INTESTINAL OBSTRUCTION, UNSPECIFIED AS TO PARTIAL VERSUS COMPLETE OBSTRUCTION: ICD-10-CM

## 2023-03-13 DIAGNOSIS — K66.8 OTHER SPECIFIED DISORDERS OF PERITONEUM: ICD-10-CM

## 2023-03-13 DIAGNOSIS — R57.1 HYPOVOLEMIC SHOCK: ICD-10-CM

## 2023-03-13 DIAGNOSIS — I63.532 CEREBRAL INFARCTION DUE TO UNSPECIFIED OCCLUSION OR STENOSIS OF LEFT POSTERIOR CEREBRAL ARTERY: ICD-10-CM

## 2023-03-13 DIAGNOSIS — R20.0 ANESTHESIA OF SKIN: ICD-10-CM

## 2023-03-13 DIAGNOSIS — Z78.1 PHYSICAL RESTRAINT STATUS: ICD-10-CM

## 2023-03-13 DIAGNOSIS — J95.851 VENTILATOR ASSOCIATED PNEUMONIA: ICD-10-CM

## 2023-03-13 DIAGNOSIS — I10 ESSENTIAL (PRIMARY) HYPERTENSION: ICD-10-CM

## 2023-03-13 DIAGNOSIS — I69.398 OTHER SEQUELAE OF CEREBRAL INFARCTION: ICD-10-CM

## 2023-03-13 DIAGNOSIS — R43.9 UNSPECIFIED DISTURBANCES OF SMELL AND TASTE: ICD-10-CM

## 2023-03-13 DIAGNOSIS — K21.9 GASTRO-ESOPHAGEAL REFLUX DISEASE WITHOUT ESOPHAGITIS: ICD-10-CM

## 2023-03-13 DIAGNOSIS — J96.01 ACUTE RESPIRATORY FAILURE WITH HYPOXIA: ICD-10-CM

## 2023-03-13 DIAGNOSIS — I48.91 UNSPECIFIED ATRIAL FIBRILLATION: ICD-10-CM

## 2023-03-13 DIAGNOSIS — T17.890A OTHER FOREIGN OBJECT IN OTHER PARTS OF RESPIRATORY TRACT CAUSING ASPHYXIATION, INITIAL ENCOUNTER: ICD-10-CM

## 2023-03-13 DIAGNOSIS — J15.0 PNEUMONIA DUE TO KLEBSIELLA PNEUMONIAE: ICD-10-CM
